# Patient Record
Sex: FEMALE | Race: WHITE | Employment: UNEMPLOYED | URBAN - METROPOLITAN AREA
[De-identification: names, ages, dates, MRNs, and addresses within clinical notes are randomized per-mention and may not be internally consistent; named-entity substitution may affect disease eponyms.]

---

## 2017-08-29 ENCOUNTER — OFFICE VISIT (OUTPATIENT)
Dept: FAMILY MEDICINE CLINIC | Age: 62
End: 2017-08-29
Payer: COMMERCIAL

## 2017-08-29 VITALS
SYSTOLIC BLOOD PRESSURE: 130 MMHG | HEART RATE: 81 BPM | HEIGHT: 65 IN | DIASTOLIC BLOOD PRESSURE: 80 MMHG | BODY MASS INDEX: 28.49 KG/M2 | WEIGHT: 171 LBS

## 2017-08-29 DIAGNOSIS — B37.2 YEAST DERMATITIS: Primary | ICD-10-CM

## 2017-08-29 PROCEDURE — 99213 OFFICE O/P EST LOW 20 MIN: CPT | Performed by: FAMILY MEDICINE

## 2017-08-29 RX ORDER — FLUCONAZOLE 100 MG/1
100 TABLET ORAL 2 TIMES DAILY
Qty: 14 TABLET | Refills: 0 | Status: SHIPPED | OUTPATIENT
Start: 2017-08-29 | End: 2017-09-05

## 2017-08-29 ASSESSMENT — PATIENT HEALTH QUESTIONNAIRE - PHQ9
2. FEELING DOWN, DEPRESSED OR HOPELESS: 0
SUM OF ALL RESPONSES TO PHQ9 QUESTIONS 1 & 2: 0
SUM OF ALL RESPONSES TO PHQ QUESTIONS 1-9: 0
1. LITTLE INTEREST OR PLEASURE IN DOING THINGS: 0

## 2017-08-29 ASSESSMENT — ENCOUNTER SYMPTOMS
VOMITING: 0
SINUS PRESSURE: 0
NAUSEA: 0
SHORTNESS OF BREATH: 0
DIARRHEA: 0
SORE THROAT: 0
COUGH: 0
BACK PAIN: 0

## 2018-06-07 ENCOUNTER — OFFICE VISIT (OUTPATIENT)
Dept: FAMILY MEDICINE CLINIC | Age: 63
End: 2018-06-07
Payer: COMMERCIAL

## 2018-06-07 ENCOUNTER — HOSPITAL ENCOUNTER (OUTPATIENT)
Age: 63
Setting detail: SPECIMEN
Discharge: HOME | End: 2018-06-07
Payer: COMMERCIAL

## 2018-06-07 VITALS
HEART RATE: 76 BPM | SYSTOLIC BLOOD PRESSURE: 118 MMHG | BODY MASS INDEX: 26.79 KG/M2 | DIASTOLIC BLOOD PRESSURE: 68 MMHG | WEIGHT: 161 LBS

## 2018-06-07 DIAGNOSIS — Z01.419 ENCOUNTER FOR PHYSICAL EXAMINATION, CONTRACEPTION, AND PAPANICOLAOU SMEAR: ICD-10-CM

## 2018-06-07 DIAGNOSIS — Z30.9 ENCOUNTER FOR PHYSICAL EXAMINATION, CONTRACEPTION, AND PAPANICOLAOU SMEAR: ICD-10-CM

## 2018-06-07 DIAGNOSIS — O24.419 GESTATIONAL DIABETES MELLITUS (GDM), ANTEPARTUM, GESTATIONAL DIABETES METHOD OF CONTROL UNSPECIFIED: ICD-10-CM

## 2018-06-07 DIAGNOSIS — R63.4 WEIGHT LOSS, NON-INTENTIONAL: ICD-10-CM

## 2018-06-07 DIAGNOSIS — Z12.39 BREAST CANCER SCREENING: ICD-10-CM

## 2018-06-07 DIAGNOSIS — Z00.00 PHYSICAL EXAM, ANNUAL: Primary | ICD-10-CM

## 2018-06-07 DIAGNOSIS — Z23 IMMUNIZATION DUE: ICD-10-CM

## 2018-06-07 DIAGNOSIS — Z13.820 OSTEOPOROSIS SCREENING: ICD-10-CM

## 2018-06-07 DIAGNOSIS — E78.5 DYSLIPIDEMIA: ICD-10-CM

## 2018-06-07 LAB
ABSOLUTE EOS #: 0.05 K/UL (ref 0–0.44)
ABSOLUTE IMMATURE GRANULOCYTE: <0.03 K/UL (ref 0–0.3)
ABSOLUTE LYMPH #: 1.87 K/UL (ref 1.1–3.7)
ABSOLUTE MONO #: 0.47 K/UL (ref 0.1–1.2)
ALBUMIN SERPL-MCNC: 4.2 G/DL (ref 3.5–5.2)
ALBUMIN/GLOBULIN RATIO: 1.3 (ref 1–2.5)
ALP BLD-CCNC: 104 U/L (ref 35–104)
ALT SERPL-CCNC: 42 U/L (ref 5–33)
ANION GAP SERPL CALCULATED.3IONS-SCNC: 18 MMOL/L (ref 9–17)
AST SERPL-CCNC: 29 U/L
BASOPHILS # BLD: 1 % (ref 0–2)
BASOPHILS ABSOLUTE: 0.03 K/UL (ref 0–0.2)
BILIRUB SERPL-MCNC: 0.46 MG/DL (ref 0.3–1.2)
BUN BLDV-MCNC: 17 MG/DL (ref 8–23)
BUN/CREAT BLD: ABNORMAL (ref 9–20)
CALCIUM SERPL-MCNC: 9.2 MG/DL (ref 8.6–10.4)
CHLORIDE BLD-SCNC: 96 MMOL/L (ref 98–107)
CHOLESTEROL/HDL RATIO: 10
CHOLESTEROL: 462 MG/DL
CO2: 21 MMOL/L (ref 20–31)
CREAT SERPL-MCNC: 0.58 MG/DL (ref 0.5–0.9)
DIFFERENTIAL TYPE: ABNORMAL
EOSINOPHILS RELATIVE PERCENT: 1 % (ref 1–4)
ESTIMATED AVERAGE GLUCOSE: 321 MG/DL
GFR AFRICAN AMERICAN: >60 ML/MIN
GFR NON-AFRICAN AMERICAN: >60 ML/MIN
GFR SERPL CREATININE-BSD FRML MDRD: ABNORMAL ML/MIN/{1.73_M2}
GFR SERPL CREATININE-BSD FRML MDRD: ABNORMAL ML/MIN/{1.73_M2}
GLUCOSE BLD-MCNC: 337 MG/DL (ref 70–99)
HBA1C MFR BLD: 12.8 % (ref 4–6)
HCT VFR BLD CALC: 51 % (ref 36.3–47.1)
HDLC SERPL-MCNC: 46 MG/DL
HEMOGLOBIN: 16 G/DL (ref 11.9–15.1)
HEPATITIS C ANTIBODY: NONREACTIVE
IMMATURE GRANULOCYTES: 0 %
LDL CHOLESTEROL DIRECT: 335 MG/DL
LDL CHOLESTEROL: ABNORMAL MG/DL (ref 0–130)
LYMPHOCYTES # BLD: 31 % (ref 24–43)
MCH RBC QN AUTO: 28.2 PG (ref 25.2–33.5)
MCHC RBC AUTO-ENTMCNC: 31.4 G/DL (ref 28.4–34.8)
MCV RBC AUTO: 89.9 FL (ref 82.6–102.9)
MONOCYTES # BLD: 8 % (ref 3–12)
NRBC AUTOMATED: 0 PER 100 WBC
PDW BLD-RTO: 12.7 % (ref 11.8–14.4)
PLATELET # BLD: 300 K/UL (ref 138–453)
PLATELET ESTIMATE: ABNORMAL
PMV BLD AUTO: 12.7 FL (ref 8.1–13.5)
POTASSIUM SERPL-SCNC: 4.2 MMOL/L (ref 3.7–5.3)
RBC # BLD: 5.67 M/UL (ref 3.95–5.11)
RBC # BLD: ABNORMAL 10*6/UL
SEG NEUTROPHILS: 59 % (ref 36–65)
SEGMENTED NEUTROPHILS ABSOLUTE COUNT: 3.69 K/UL (ref 1.5–8.1)
SODIUM BLD-SCNC: 135 MMOL/L (ref 135–144)
T4 TOTAL: 7.2 UG/DL (ref 4.5–12)
TOTAL PROTEIN: 7.5 G/DL (ref 6.4–8.3)
TRIGL SERPL-MCNC: 526 MG/DL
TSH SERPL DL<=0.05 MIU/L-ACNC: 2.33 MIU/L (ref 0.3–5)
VLDLC SERPL CALC-MCNC: ABNORMAL MG/DL (ref 1–30)
WBC # BLD: 6.1 K/UL (ref 3.5–11.3)
WBC # BLD: ABNORMAL 10*3/UL

## 2018-06-07 PROCEDURE — 90715 TDAP VACCINE 7 YRS/> IM: CPT | Performed by: FAMILY MEDICINE

## 2018-06-07 PROCEDURE — 90732 PPSV23 VACC 2 YRS+ SUBQ/IM: CPT | Performed by: FAMILY MEDICINE

## 2018-06-07 PROCEDURE — 99396 PREV VISIT EST AGE 40-64: CPT | Performed by: FAMILY MEDICINE

## 2018-06-07 PROCEDURE — 90471 IMMUNIZATION ADMIN: CPT | Performed by: FAMILY MEDICINE

## 2018-06-07 PROCEDURE — 90472 IMMUNIZATION ADMIN EACH ADD: CPT | Performed by: FAMILY MEDICINE

## 2018-06-07 ASSESSMENT — ENCOUNTER SYMPTOMS
SHORTNESS OF BREATH: 0
NAUSEA: 0
VOMITING: 0
COUGH: 0
DIARRHEA: 0
BACK PAIN: 0
SORE THROAT: 0
SINUS PRESSURE: 0

## 2018-06-08 RX ORDER — ATORVASTATIN CALCIUM 20 MG/1
20 TABLET, FILM COATED ORAL DAILY
Qty: 30 TABLET | Refills: 3 | Status: SHIPPED | OUTPATIENT
Start: 2018-06-08 | End: 2018-10-08 | Stop reason: SDUPTHER

## 2018-06-27 LAB — CYTOLOGY REPORT: NORMAL

## 2018-07-17 ENCOUNTER — HOSPITAL ENCOUNTER (OUTPATIENT)
Dept: MAMMOGRAPHY | Age: 63
Discharge: HOME | End: 2018-07-19
Payer: COMMERCIAL

## 2018-07-17 DIAGNOSIS — Z12.39 BREAST CANCER SCREENING: ICD-10-CM

## 2018-07-17 DIAGNOSIS — Z13.820 OSTEOPOROSIS SCREENING: ICD-10-CM

## 2018-07-17 PROCEDURE — 77063 BREAST TOMOSYNTHESIS BI: CPT

## 2018-07-17 PROCEDURE — 77080 DXA BONE DENSITY AXIAL: CPT

## 2018-07-18 ENCOUNTER — TELEPHONE (OUTPATIENT)
Dept: OTHER | Age: 63
End: 2018-07-18

## 2018-07-19 DIAGNOSIS — Z00.00 ROUTINE ADULT HEALTH MAINTENANCE: Primary | ICD-10-CM

## 2018-07-20 ENCOUNTER — HOSPITAL ENCOUNTER (OUTPATIENT)
Age: 63
Setting detail: SPECIMEN
Discharge: HOME | End: 2018-07-20
Payer: COMMERCIAL

## 2018-07-20 DIAGNOSIS — Z00.00 ROUTINE ADULT HEALTH MAINTENANCE: ICD-10-CM

## 2018-07-20 LAB
ABSOLUTE EOS #: 0.17 K/UL (ref 0–0.44)
ABSOLUTE IMMATURE GRANULOCYTE: 0.03 K/UL (ref 0–0.3)
ABSOLUTE LYMPH #: 2.73 K/UL (ref 1.1–3.7)
ABSOLUTE MONO #: 0.76 K/UL (ref 0.1–1.2)
ALBUMIN SERPL-MCNC: 4 G/DL (ref 3.5–5.2)
ALBUMIN/GLOBULIN RATIO: 1.3 (ref 1–2.5)
ALP BLD-CCNC: 86 U/L (ref 35–104)
ALT SERPL-CCNC: 36 U/L (ref 5–33)
ANION GAP SERPL CALCULATED.3IONS-SCNC: 16 MMOL/L (ref 9–17)
AST SERPL-CCNC: 21 U/L
BASOPHILS # BLD: 1 % (ref 0–2)
BASOPHILS ABSOLUTE: 0.06 K/UL (ref 0–0.2)
BILIRUB SERPL-MCNC: 0.38 MG/DL (ref 0.3–1.2)
BUN BLDV-MCNC: 13 MG/DL (ref 8–23)
BUN/CREAT BLD: ABNORMAL (ref 9–20)
CALCIUM SERPL-MCNC: 9.1 MG/DL (ref 8.6–10.4)
CHLORIDE BLD-SCNC: 104 MMOL/L (ref 98–107)
CHOLESTEROL/HDL RATIO: 4.5
CHOLESTEROL: 228 MG/DL
CO2: 21 MMOL/L (ref 20–31)
CREAT SERPL-MCNC: 0.56 MG/DL (ref 0.5–0.9)
DIFFERENTIAL TYPE: ABNORMAL
EOSINOPHILS RELATIVE PERCENT: 2 % (ref 1–4)
ESTIMATED AVERAGE GLUCOSE: 280 MG/DL
GFR AFRICAN AMERICAN: >60 ML/MIN
GFR NON-AFRICAN AMERICAN: >60 ML/MIN
GFR SERPL CREATININE-BSD FRML MDRD: ABNORMAL ML/MIN/{1.73_M2}
GFR SERPL CREATININE-BSD FRML MDRD: ABNORMAL ML/MIN/{1.73_M2}
GLUCOSE BLD-MCNC: 194 MG/DL (ref 70–99)
HBA1C MFR BLD: 11.4 % (ref 4–6)
HCT VFR BLD CALC: 47.3 % (ref 36.3–47.1)
HDLC SERPL-MCNC: 51 MG/DL
HEMOGLOBIN: 14.6 G/DL (ref 11.9–15.1)
IMMATURE GRANULOCYTES: 0 %
LDL CHOLESTEROL: 138 MG/DL (ref 0–130)
LYMPHOCYTES # BLD: 26 % (ref 24–43)
MCH RBC QN AUTO: 28 PG (ref 25.2–33.5)
MCHC RBC AUTO-ENTMCNC: 30.9 G/DL (ref 28.4–34.8)
MCV RBC AUTO: 90.6 FL (ref 82.6–102.9)
MONOCYTES # BLD: 7 % (ref 3–12)
NRBC AUTOMATED: 0 PER 100 WBC
PDW BLD-RTO: 12.7 % (ref 11.8–14.4)
PLATELET # BLD: 269 K/UL (ref 138–453)
PLATELET ESTIMATE: ABNORMAL
PMV BLD AUTO: 12.4 FL (ref 8.1–13.5)
POTASSIUM SERPL-SCNC: 4.4 MMOL/L (ref 3.7–5.3)
RBC # BLD: 5.22 M/UL (ref 3.95–5.11)
RBC # BLD: ABNORMAL 10*6/UL
SEG NEUTROPHILS: 64 % (ref 36–65)
SEGMENTED NEUTROPHILS ABSOLUTE COUNT: 6.63 K/UL (ref 1.5–8.1)
SODIUM BLD-SCNC: 141 MMOL/L (ref 135–144)
TOTAL PROTEIN: 7.2 G/DL (ref 6.4–8.3)
TRIGL SERPL-MCNC: 197 MG/DL
VLDLC SERPL CALC-MCNC: ABNORMAL MG/DL (ref 1–30)
WBC # BLD: 10.4 K/UL (ref 3.5–11.3)
WBC # BLD: ABNORMAL 10*3/UL

## 2018-07-24 ENCOUNTER — OFFICE VISIT (OUTPATIENT)
Dept: FAMILY MEDICINE CLINIC | Age: 63
End: 2018-07-24
Payer: COMMERCIAL

## 2018-07-24 VITALS
WEIGHT: 165 LBS | DIASTOLIC BLOOD PRESSURE: 68 MMHG | SYSTOLIC BLOOD PRESSURE: 122 MMHG | HEART RATE: 72 BPM | BODY MASS INDEX: 27.46 KG/M2

## 2018-07-24 DIAGNOSIS — E78.5 DYSLIPIDEMIA: ICD-10-CM

## 2018-07-24 DIAGNOSIS — E11.9 TYPE 2 DIABETES MELLITUS WITHOUT COMPLICATION, WITHOUT LONG-TERM CURRENT USE OF INSULIN (HCC): Primary | ICD-10-CM

## 2018-07-24 PROCEDURE — 99213 OFFICE O/P EST LOW 20 MIN: CPT | Performed by: FAMILY MEDICINE

## 2018-07-24 ASSESSMENT — ENCOUNTER SYMPTOMS
BACK PAIN: 0
SHORTNESS OF BREATH: 0
VOMITING: 0
NAUSEA: 0
SINUS PRESSURE: 0
DIARRHEA: 1
COUGH: 0
SORE THROAT: 0

## 2018-07-24 NOTE — PROGRESS NOTES
past surgical history on file. Family History   Problem Relation Age of Onset    Cancer Mother         pancreas    Heart Disease Mother         tachycardia; ? diagnosis    Cancer Father         thyroid    Diabetes Father     Cancer Maternal Uncle         pancreas; bladder;     Heart Disease Maternal Uncle         hearth failure, ?cause    Cancer Paternal Uncle         lung in smoker    Cancer Paternal Grandfather         esophageal     Social History   Substance Use Topics    Smoking status: Never Smoker    Smokeless tobacco: Never Used    Alcohol use Not on file      Current Outpatient Prescriptions   Medication Sig Dispense Refill    metFORMIN (GLUCOPHAGE) 500 MG tablet Take 1 tablet by mouth 2 times daily 60 tablet 3    atorvastatin (LIPITOR) 20 MG tablet Take 1 tablet by mouth daily 30 tablet 3     No current facility-administered medications for this visit. No Known Allergies      Subjective:   Review of Systems   Constitutional: Negative for chills, diaphoresis and fever. HENT: Negative for congestion, sinus pressure and sore throat. Eyes: Negative for visual disturbance. Respiratory: Negative for cough and shortness of breath. Cardiovascular: Negative for chest pain and leg swelling. Gastrointestinal: Positive for diarrhea. Negative for nausea and vomiting. Genitourinary: Negative for dysuria, menstrual problem, urgency and vaginal discharge. Musculoskeletal: Negative for arthralgias, back pain and myalgias. Skin: Negative for rash. Neurological: Negative for weakness, numbness and headaches. Psychiatric/Behavioral: Negative for sleep disturbance. Objective:   /68   Pulse 72   Wt 165 lb (74.8 kg)   BMI 27.46 kg/m²     Physical Exam   Constitutional: She is oriented to person, place, and time. She appears well-developed and well-nourished. No distress. HENT:   Head: Normocephalic and atraumatic. Mouth/Throat: No oropharyngeal exudate.    Eyes: No

## 2018-10-08 RX ORDER — ATORVASTATIN CALCIUM 20 MG/1
20 TABLET, FILM COATED ORAL DAILY
Qty: 30 TABLET | Refills: 11 | Status: SHIPPED | OUTPATIENT
Start: 2018-10-08 | End: 2019-10-09 | Stop reason: SDUPTHER

## 2018-10-22 ENCOUNTER — HOSPITAL ENCOUNTER (OUTPATIENT)
Age: 63
Setting detail: SPECIMEN
Discharge: HOME | End: 2018-10-22
Payer: COMMERCIAL

## 2018-10-22 DIAGNOSIS — E11.9 TYPE 2 DIABETES MELLITUS WITHOUT COMPLICATION, WITHOUT LONG-TERM CURRENT USE OF INSULIN (HCC): ICD-10-CM

## 2018-10-22 LAB
ABSOLUTE EOS #: 0.1 K/UL (ref 0–0.44)
ABSOLUTE IMMATURE GRANULOCYTE: 0.03 K/UL (ref 0–0.3)
ABSOLUTE LYMPH #: 2.1 K/UL (ref 1.1–3.7)
ABSOLUTE MONO #: 0.52 K/UL (ref 0.1–1.2)
ALBUMIN SERPL-MCNC: 4 G/DL (ref 3.5–5.2)
ALBUMIN/GLOBULIN RATIO: 1.3 (ref 1–2.5)
ALP BLD-CCNC: 89 U/L (ref 35–104)
ALT SERPL-CCNC: 58 U/L (ref 5–33)
ANION GAP SERPL CALCULATED.3IONS-SCNC: 18 MMOL/L (ref 9–17)
AST SERPL-CCNC: 30 U/L
BASOPHILS # BLD: 1 % (ref 0–2)
BASOPHILS ABSOLUTE: 0.04 K/UL (ref 0–0.2)
BILIRUB SERPL-MCNC: 0.48 MG/DL (ref 0.3–1.2)
BUN BLDV-MCNC: 14 MG/DL (ref 8–23)
BUN/CREAT BLD: ABNORMAL (ref 9–20)
CALCIUM SERPL-MCNC: 9.3 MG/DL (ref 8.6–10.4)
CHLORIDE BLD-SCNC: 105 MMOL/L (ref 98–107)
CHOLESTEROL/HDL RATIO: 4.5
CHOLESTEROL: 236 MG/DL
CO2: 22 MMOL/L (ref 20–31)
CREAT SERPL-MCNC: 0.6 MG/DL (ref 0.5–0.9)
DIFFERENTIAL TYPE: ABNORMAL
EOSINOPHILS RELATIVE PERCENT: 1 % (ref 1–4)
ESTIMATED AVERAGE GLUCOSE: 186 MG/DL
GFR AFRICAN AMERICAN: >60 ML/MIN
GFR NON-AFRICAN AMERICAN: >60 ML/MIN
GFR SERPL CREATININE-BSD FRML MDRD: ABNORMAL ML/MIN/{1.73_M2}
GFR SERPL CREATININE-BSD FRML MDRD: ABNORMAL ML/MIN/{1.73_M2}
GLUCOSE BLD-MCNC: 150 MG/DL (ref 70–99)
HBA1C MFR BLD: 8.1 % (ref 4–6)
HCT VFR BLD CALC: 48 % (ref 36.3–47.1)
HDLC SERPL-MCNC: 53 MG/DL
HEMOGLOBIN: 14.7 G/DL (ref 11.9–15.1)
IMMATURE GRANULOCYTES: 0 %
LDL CHOLESTEROL: 142 MG/DL (ref 0–130)
LYMPHOCYTES # BLD: 28 % (ref 24–43)
MCH RBC QN AUTO: 27.9 PG (ref 25.2–33.5)
MCHC RBC AUTO-ENTMCNC: 30.6 G/DL (ref 28.4–34.8)
MCV RBC AUTO: 91.3 FL (ref 82.6–102.9)
MONOCYTES # BLD: 7 % (ref 3–12)
NRBC AUTOMATED: 0 PER 100 WBC
PDW BLD-RTO: 12.7 % (ref 11.8–14.4)
PLATELET # BLD: 281 K/UL (ref 138–453)
PLATELET ESTIMATE: ABNORMAL
PMV BLD AUTO: 12.1 FL (ref 8.1–13.5)
POTASSIUM SERPL-SCNC: 3.9 MMOL/L (ref 3.7–5.3)
RBC # BLD: 5.26 M/UL (ref 3.95–5.11)
RBC # BLD: ABNORMAL 10*6/UL
SEG NEUTROPHILS: 63 % (ref 36–65)
SEGMENTED NEUTROPHILS ABSOLUTE COUNT: 4.78 K/UL (ref 1.5–8.1)
SODIUM BLD-SCNC: 145 MMOL/L (ref 135–144)
TOTAL PROTEIN: 7.1 G/DL (ref 6.4–8.3)
TRIGL SERPL-MCNC: 207 MG/DL
VLDLC SERPL CALC-MCNC: ABNORMAL MG/DL (ref 1–30)
WBC # BLD: 7.6 K/UL (ref 3.5–11.3)
WBC # BLD: ABNORMAL 10*3/UL

## 2018-10-29 ENCOUNTER — OFFICE VISIT (OUTPATIENT)
Dept: FAMILY MEDICINE CLINIC | Age: 63
End: 2018-10-29
Payer: COMMERCIAL

## 2018-10-29 VITALS
SYSTOLIC BLOOD PRESSURE: 128 MMHG | DIASTOLIC BLOOD PRESSURE: 74 MMHG | BODY MASS INDEX: 28.52 KG/M2 | HEART RATE: 83 BPM | HEIGHT: 65 IN | WEIGHT: 171.2 LBS | OXYGEN SATURATION: 98 %

## 2018-10-29 DIAGNOSIS — Z23 IMMUNIZATION DUE: ICD-10-CM

## 2018-10-29 DIAGNOSIS — E11.9 TYPE 2 DIABETES MELLITUS WITHOUT COMPLICATION, WITHOUT LONG-TERM CURRENT USE OF INSULIN (HCC): ICD-10-CM

## 2018-10-29 DIAGNOSIS — M25.561 ARTHRALGIA OF BOTH KNEES: Primary | ICD-10-CM

## 2018-10-29 DIAGNOSIS — E78.5 DYSLIPIDEMIA: ICD-10-CM

## 2018-10-29 DIAGNOSIS — M25.562 ARTHRALGIA OF BOTH KNEES: Primary | ICD-10-CM

## 2018-10-29 PROCEDURE — 90688 IIV4 VACCINE SPLT 0.5 ML IM: CPT | Performed by: FAMILY MEDICINE

## 2018-10-29 PROCEDURE — 99213 OFFICE O/P EST LOW 20 MIN: CPT | Performed by: FAMILY MEDICINE

## 2018-10-29 PROCEDURE — 90471 IMMUNIZATION ADMIN: CPT | Performed by: FAMILY MEDICINE

## 2018-10-29 RX ORDER — METFORMIN HYDROCHLORIDE 500 MG/1
1000 TABLET, EXTENDED RELEASE ORAL
Qty: 60 TABLET | Refills: 5 | Status: SHIPPED | OUTPATIENT
Start: 2018-10-29 | End: 2019-09-10 | Stop reason: ALTCHOICE

## 2018-10-29 ASSESSMENT — ENCOUNTER SYMPTOMS
DIARRHEA: 0
SINUS PRESSURE: 0
NAUSEA: 0
VOMITING: 0
BACK PAIN: 0
SORE THROAT: 0
SHORTNESS OF BREATH: 0
COUGH: 0

## 2018-10-29 ASSESSMENT — PATIENT HEALTH QUESTIONNAIRE - PHQ9
2. FEELING DOWN, DEPRESSED OR HOPELESS: 0
1. LITTLE INTEREST OR PLEASURE IN DOING THINGS: 0
SUM OF ALL RESPONSES TO PHQ QUESTIONS 1-9: 0
SUM OF ALL RESPONSES TO PHQ9 QUESTIONS 1 & 2: 0
SUM OF ALL RESPONSES TO PHQ QUESTIONS 1-9: 0

## 2018-10-29 NOTE — PROGRESS NOTES
Diagnosis Date    Gestational diabetes       No past surgical history on file. Family History   Problem Relation Age of Onset    Cancer Mother         pancreas    Heart Disease Mother         tachycardia; ? diagnosis    Cancer Father         thyroid    Diabetes Father     Cancer Maternal Uncle         pancreas; bladder;     Heart Disease Maternal Uncle         hearth failure, ?cause    Cancer Paternal Uncle         lung in smoker    Cancer Paternal Grandfather         esophageal     Social History   Substance Use Topics    Smoking status: Never Smoker    Smokeless tobacco: Never Used    Alcohol use Not on file      Current Outpatient Prescriptions   Medication Sig Dispense Refill    metFORMIN (GLUCOPHAGE-XR) 500 MG extended release tablet Take 2 tablets by mouth daily (with breakfast) 60 tablet 5    atorvastatin (LIPITOR) 20 MG tablet take 1 tablet by mouth daily 30 tablet 11     No current facility-administered medications for this visit. No Known Allergies      Subjective:   Review of Systems   Constitutional: Negative for chills, diaphoresis and fever. HENT: Negative for congestion, sinus pressure and sore throat. Eyes: Negative for visual disturbance. Respiratory: Negative for cough and shortness of breath. Cardiovascular: Negative for chest pain and leg swelling. Gastrointestinal: Negative for diarrhea, nausea and vomiting. Genitourinary: Negative for dysuria, menstrual problem, urgency and vaginal discharge. Musculoskeletal: Positive for arthralgias and gait problem. Negative for back pain, joint swelling and myalgias. Skin: Negative for rash. Neurological: Negative for weakness, numbness and headaches. Psychiatric/Behavioral: Negative for sleep disturbance. Objective:   /74   Pulse 83   Ht 5' 5\" (1.651 m)   Wt 171 lb 3.2 oz (77.7 kg)   SpO2 98%   BMI 28.49 kg/m²     Physical Exam   Constitutional: She is oriented to person, place, and time.  She

## 2018-11-06 DIAGNOSIS — M25.561 PAIN IN BOTH KNEES, UNSPECIFIED CHRONICITY: Primary | ICD-10-CM

## 2018-11-06 DIAGNOSIS — M25.562 PAIN IN BOTH KNEES, UNSPECIFIED CHRONICITY: Primary | ICD-10-CM

## 2018-11-08 ENCOUNTER — OFFICE VISIT (OUTPATIENT)
Dept: ORTHOPEDIC SURGERY | Age: 63
End: 2018-11-08
Payer: COMMERCIAL

## 2018-11-08 VITALS — BODY MASS INDEX: 28.99 KG/M2 | HEIGHT: 65 IN | WEIGHT: 174 LBS

## 2018-11-08 DIAGNOSIS — Z01.818 PRE-OP TESTING: ICD-10-CM

## 2018-11-08 DIAGNOSIS — M17.11 ARTHRITIS OF RIGHT KNEE: Primary | ICD-10-CM

## 2018-11-08 DIAGNOSIS — M17.12 ARTHRITIS OF LEFT KNEE: ICD-10-CM

## 2018-11-08 PROCEDURE — 99244 OFF/OP CNSLTJ NEW/EST MOD 40: CPT | Performed by: ORTHOPAEDIC SURGERY

## 2018-11-08 PROCEDURE — 20610 DRAIN/INJ JOINT/BURSA W/O US: CPT | Performed by: ORTHOPAEDIC SURGERY

## 2018-11-08 RX ORDER — METHYLPREDNISOLONE ACETATE 80 MG/ML
80 INJECTION, SUSPENSION INTRA-ARTICULAR; INTRALESIONAL; INTRAMUSCULAR; SOFT TISSUE ONCE
Status: COMPLETED | OUTPATIENT
Start: 2018-11-08 | End: 2018-11-09

## 2018-11-08 RX ORDER — ACETAMINOPHEN 325 MG/1
650 TABLET ORAL EVERY 6 HOURS PRN
COMMUNITY
End: 2019-04-02 | Stop reason: ALTCHOICE

## 2018-11-08 RX ORDER — BUPIVACAINE HYDROCHLORIDE 2.5 MG/ML
2 INJECTION, SOLUTION INFILTRATION; PERINEURAL ONCE
Status: COMPLETED | OUTPATIENT
Start: 2018-11-08 | End: 2018-11-09

## 2018-11-08 ASSESSMENT — ENCOUNTER SYMPTOMS
APNEA: 0
VOMITING: 0
CHEST TIGHTNESS: 0
ABDOMINAL PAIN: 0
COUGH: 0

## 2018-11-08 NOTE — PROGRESS NOTES
[facility]  [address] [place], [place] [address] [address] Jamil Howell [address]  Dept: [phone]    Ambulatory Orthopedic Consult      CHIEF COMPLAINT:    Chief Complaint   Patient presents with    Knee Pain     bilateral       HISTORY OF PRESENT ILLNESS:      The patient is a 59 y.o. female who is being seen at the request of  Stephan Valencia MD for consultation and evaluation of bilateral knee pain. Shannan Haley  presents for bilateral knee pain that has been present for approximately 6 months. The patient states that the right is worse than the left. The patient does not recall a specific injury. The pain improves with resting and icing. The pain worsens with  stair climbing and arising from a seated position as well as when she is hiking with her . The patient has pain with walking down hill when hiking and she does see some instability with the right knee. The patient has not had a previous corticosteroid injection. The patient has tried home exercise program as well as activity modification. The patient has tried oral NSAIDs for this problem previously but has not seen any relief with them. The patient has recently decided to go to the gym to work on strengthening of the knees. Past Medical History:    Past Medical History:   Diagnosis Date    Gestational diabetes        Past Surgical History:    No past surgical history on file. Current Medications:   Current Outpatient Prescriptions   Medication Sig Dispense Refill    acetaminophen (TYLENOL) 325 MG tablet Take 650 mg by mouth every 6 hours as needed for Pain      metFORMIN (GLUCOPHAGE-XR) 500 MG extended release tablet Take 2 tablets by mouth daily (with breakfast) 60 tablet 5    atorvastatin (LIPITOR) 20 MG tablet take 1 tablet by mouth daily 30 tablet 11     No current facility-administered medications for this visit. Allergies:    Patient has no known allergies.     Social History:   Social History History: Left knee pain. Findings: Standing AP, lateral, merchant, tunnel view x-rays of left knee done the office today shows severe medial joint space narrowing with periarticular osteophytosis and joint line sclerosis. No evidence of fracture, subluxation, dislocation, radiopaque foreign body, radiopaque tumors appreciated. Impression: Severe degenerative changes left knee as described above. KNEE INJECTION PROCEDURE NOTE:  The patient was identified. The left knee was confirmed with the patient. After a sterile prep with Betadine the knee was injected using a lateral joint line approach with a mixture of 2 mL of 0.25% Marcaine and 80 mg of Depo-Medrol. Patient tolerated the procedure well without post injection complications. I instructed the patient to call our office immediately if they have any swelling or increased pain at the injection site. ASSESSMENT:     1. Arthritis of right knee    2. Arthritis of left knee    3. Pre-op testing         PLAN:       Reviewed x-rays with the patient today in the office. Discussed etiology and natural history of Bilateral knee arthritis. The treatment options may include oral anti-inflammatories, bracing, injections, advanced imaging, activity modification, physical therapy and/or surgical intervention. Due to deformity to the right knee I would highly suggest she has a right knee total arthroplasty. I feel that over time the deformity will worsen and cause more pain and instability to knee right knee. I feel that the left knee is okay to treat non-operatively at this time. The patient would like to proceed with right knee total arthroplasty and treat left knee non-operatively. The patient would like to have a corticosteroid injection to the left knee, continue to do her own work outs, and take OTC anti-inflammtories. The patient tolerates corticosteroid injection to left knee well and without complication.  The patient will follow up in the office 2 recognition program.  While intending to generate a document that actually reflects the content of the visit, the document can still have some errors including those of syntax and sound a like substitutions which may escape proof reading.  In such instances, actual meaning can be extrapolated by contextual diversion      Electronically signed by José Manuel Mcnally DO, FAOAO on 11/10/2018 at 6:12 PM

## 2018-11-09 RX ADMIN — BUPIVACAINE HYDROCHLORIDE 5 MG: 2.5 INJECTION, SOLUTION INFILTRATION; PERINEURAL at 13:53

## 2018-11-09 RX ADMIN — METHYLPREDNISOLONE ACETATE 80 MG: 80 INJECTION, SUSPENSION INTRA-ARTICULAR; INTRALESIONAL; INTRAMUSCULAR; SOFT TISSUE at 13:54

## 2018-11-12 ENCOUNTER — TELEPHONE (OUTPATIENT)
Dept: ORTHOPEDIC SURGERY | Age: 63
End: 2018-11-12

## 2018-11-12 DIAGNOSIS — Z01.818 PRE-OP TESTING: Primary | ICD-10-CM

## 2018-12-20 ENCOUNTER — TELEPHONE (OUTPATIENT)
Dept: FAMILY MEDICINE CLINIC | Age: 63
End: 2018-12-20

## 2018-12-21 ENCOUNTER — HOSPITAL ENCOUNTER (OUTPATIENT)
Dept: GENERAL RADIOLOGY | Age: 63
Discharge: HOME | End: 2018-12-23
Payer: COMMERCIAL

## 2018-12-21 ENCOUNTER — HOSPITAL ENCOUNTER (OUTPATIENT)
Dept: PREADMISSION TESTING | Age: 63
Discharge: HOME | End: 2018-12-25
Payer: COMMERCIAL

## 2018-12-21 VITALS
OXYGEN SATURATION: 97 % | DIASTOLIC BLOOD PRESSURE: 79 MMHG | HEART RATE: 71 BPM | HEIGHT: 65 IN | TEMPERATURE: 98 F | RESPIRATION RATE: 20 BRPM | SYSTOLIC BLOOD PRESSURE: 143 MMHG | WEIGHT: 175.04 LBS | BODY MASS INDEX: 29.16 KG/M2

## 2018-12-21 LAB
ALBUMIN SERPL-MCNC: 4.1 G/DL (ref 3.5–5.2)
ALBUMIN/GLOBULIN RATIO: 1.3 (ref 1–2.5)
ALP BLD-CCNC: 93 U/L (ref 35–104)
ALT SERPL-CCNC: 38 U/L (ref 5–33)
ANION GAP SERPL CALCULATED.3IONS-SCNC: 13 MMOL/L (ref 9–17)
AST SERPL-CCNC: 28 U/L
BILIRUB SERPL-MCNC: 0.49 MG/DL (ref 0.3–1.2)
BILIRUBIN URINE: NEGATIVE
BUN BLDV-MCNC: 13 MG/DL (ref 8–23)
BUN/CREAT BLD: ABNORMAL (ref 9–20)
CALCIUM SERPL-MCNC: 9.5 MG/DL (ref 8.6–10.4)
CHLORIDE BLD-SCNC: 103 MMOL/L (ref 98–107)
CO2: 25 MMOL/L (ref 20–31)
COLOR: YELLOW
COMMENT UA: NORMAL
CREAT SERPL-MCNC: 0.59 MG/DL (ref 0.5–0.9)
EKG ATRIAL RATE: 66 BPM
EKG P AXIS: 20 DEGREES
EKG P-R INTERVAL: 140 MS
EKG Q-T INTERVAL: 428 MS
EKG QRS DURATION: 90 MS
EKG QTC CALCULATION (BAZETT): 448 MS
EKG R AXIS: 14 DEGREES
EKG T AXIS: 6 DEGREES
EKG VENTRICULAR RATE: 66 BPM
ESTIMATED AVERAGE GLUCOSE: 203 MG/DL
GFR AFRICAN AMERICAN: >60 ML/MIN
GFR NON-AFRICAN AMERICAN: >60 ML/MIN
GFR SERPL CREATININE-BSD FRML MDRD: ABNORMAL ML/MIN/{1.73_M2}
GFR SERPL CREATININE-BSD FRML MDRD: ABNORMAL ML/MIN/{1.73_M2}
GLUCOSE BLD-MCNC: 121 MG/DL (ref 70–99)
GLUCOSE URINE: NEGATIVE
HBA1C MFR BLD: 8.7 % (ref 4–6)
HCT VFR BLD CALC: 46.1 % (ref 36.3–47.1)
HEMOGLOBIN: 14.6 G/DL (ref 11.9–15.1)
KETONES, URINE: NEGATIVE
LEUKOCYTE ESTERASE, URINE: NEGATIVE
MCH RBC QN AUTO: 28.3 PG (ref 25.2–33.5)
MCHC RBC AUTO-ENTMCNC: 31.7 G/DL (ref 28.4–34.8)
MCV RBC AUTO: 89.3 FL (ref 82.6–102.9)
MRSA, DNA, NASAL: NORMAL
NITRITE, URINE: NEGATIVE
NRBC AUTOMATED: 0 PER 100 WBC
PDW BLD-RTO: 12.9 % (ref 11.8–14.4)
PH UA: 5.5 (ref 5–8)
PLATELET # BLD: 299 K/UL (ref 138–453)
PMV BLD AUTO: 11.8 FL (ref 8.1–13.5)
POTASSIUM SERPL-SCNC: 3.9 MMOL/L (ref 3.7–5.3)
PROTEIN UA: NEGATIVE
RBC # BLD: 5.16 M/UL (ref 3.95–5.11)
SODIUM BLD-SCNC: 141 MMOL/L (ref 135–144)
SPECIFIC GRAVITY UA: 1.02 (ref 1–1.03)
SPECIMEN DESCRIPTION: NORMAL
TOTAL PROTEIN: 7.2 G/DL (ref 6.4–8.3)
TURBIDITY: CLEAR
URINE HGB: NEGATIVE
UROBILINOGEN, URINE: NORMAL
WBC # BLD: 8.1 K/UL (ref 3.5–11.3)

## 2018-12-21 PROCEDURE — 71046 X-RAY EXAM CHEST 2 VIEWS: CPT

## 2018-12-21 PROCEDURE — 80053 COMPREHEN METABOLIC PANEL: CPT

## 2018-12-21 PROCEDURE — 36415 COLL VENOUS BLD VENIPUNCTURE: CPT

## 2018-12-21 PROCEDURE — 83036 HEMOGLOBIN GLYCOSYLATED A1C: CPT

## 2018-12-21 PROCEDURE — 85027 COMPLETE CBC AUTOMATED: CPT

## 2018-12-21 PROCEDURE — 93005 ELECTROCARDIOGRAM TRACING: CPT

## 2018-12-21 PROCEDURE — 81003 URINALYSIS AUTO W/O SCOPE: CPT

## 2018-12-21 PROCEDURE — 87641 MR-STAPH DNA AMP PROBE: CPT

## 2018-12-21 RX ORDER — SODIUM CHLORIDE, SODIUM LACTATE, POTASSIUM CHLORIDE, CALCIUM CHLORIDE 600; 310; 30; 20 MG/100ML; MG/100ML; MG/100ML; MG/100ML
1000 INJECTION, SOLUTION INTRAVENOUS CONTINUOUS
Status: CANCELLED | OUTPATIENT
Start: 2018-12-21

## 2018-12-21 NOTE — ANESTHESIA PRE-OP
Anesthesia Focused Assessment    STOP-BANG Sleep Apnea Questionnaire    Obstructive Sleep Apnea:                                                                 No     Machine used:                                                                                  No            SNORE loudly (heard through closed doors)? No      TIRED, fatigued, sleepy during daytime? No      OBSERVED stopping breathing during sleep? No      High blood PRESSURE being treated? No      BMI over 35? No  AGE over 48? Yes  NECK circumference over 16\"? No  GENDER (male)? No               High risk 5-8  Intermediate risk 3-4  Low risk 0-2    Total 3  High risk 5-8  Intermediate risk 3-4  Low risk 0-2      Type 1 DM:                                                                                        No    TYPE 2:                                                                                             Yes    If yes, insulin dependent? No    Coronary Artery Disease :                                                                No        Active smoker                                                                                   No    Drinks Alcohol                                                                                   Yes, social    Dentition: Dentures                                                                            No              Partial                                                                                                 No    Any loose or missing teeth                                                                 No    Defib / AICD / Pacemaker:                                                               No    Renal Failure: No    If Yes, On dialysis?       Hx of anesthesia complications with Patient                               Never had general    Hx of anesthesia complications with family

## 2018-12-21 NOTE — H&P
mother, paternal grandfather, and paternal uncle; Diabetes in her father; Heart Disease in her maternal uncle and mother. Family Status   Relation Status    Mother (Not Specified)    Father (Not Specified)    MUnc (Not Specified)    PUnc (Not Specified)    PGF (Not Specified)         Social History  Social History     Social History    Marital status: Single     Spouse name: N/A    Number of children: N/A    Years of education: N/A     Occupational History    Not on file. Social History Main Topics    Smoking status: Never Smoker    Smokeless tobacco: Never Used    Alcohol use Not on file    Drug use: Unknown    Sexual activity: Not on file     Other Topics Concern    Not on file     Social History Narrative    No narrative on file           Hobbies: [employer] [place]    OBJECTIVE:   VITALS:  vitals were not taken for this visit. CONSTITUTIONAL: Alert and oriented times 3, no acute distress and cooperative to examination. friendly and pleasant     SKIN: rash No    HEENT: Head is normocephalic, atraumatic. EOMI, PERRLA    Oral air way :slightly narrow Yes, tonsils 2+ bilateral    NECK: neck supple, no lymphadenopathy noted, trachea midline and straight       2+ carotid, no bruit    LUNGS: Chest expands equally bilaterally upon respiration, no accessory muscle used. Ausculation reveals no adventitious breath sounds. CARDIOVASCULAR: \"Heart sounds are normal.  Regular rate and rhythm without murmur,    ABDOMEN: Bowel sounds are present in all four quadrants      GENATALIA:Deferred. NEUROLOGIC: \"CN II-XII are grossly intact. EXTREMITIES: Pitting edema:  No,  Varicose veins: No     Dorsal pedal/posterior tibial pulses palpable: Yes         Strength:  Normal       Patient Active Problem List   Diagnosis    Gestational diabetes    Dyslipidemia    Type 2 diabetes mellitus without complication, without long-term current use of insulin ([facility] [address]               IMPRESSIONS:   1.  DJD of RIGHT knee  2.  does not have any pertinent problems on file.     Domi Padron [facility]  Electronically signed 12/21/2018 at 1:27 PM       Scheduled for: [facility] Robotic RIGHT total knee arthroplasty

## 2019-01-11 ENCOUNTER — OFFICE VISIT (OUTPATIENT)
Dept: FAMILY MEDICINE CLINIC | Age: 64
End: 2019-01-11
Payer: COMMERCIAL

## 2019-01-11 VITALS
DIASTOLIC BLOOD PRESSURE: 70 MMHG | BODY MASS INDEX: 28.96 KG/M2 | HEART RATE: 89 BPM | WEIGHT: 174 LBS | SYSTOLIC BLOOD PRESSURE: 132 MMHG

## 2019-01-11 DIAGNOSIS — E11.9 TYPE 2 DIABETES MELLITUS WITHOUT COMPLICATION, WITHOUT LONG-TERM CURRENT USE OF INSULIN (HCC): Primary | ICD-10-CM

## 2019-01-11 LAB
CREATININE URINE POCT: 200
MICROALBUMIN/CREAT 24H UR: 10 MG/G{CREAT}
MICROALBUMIN/CREAT UR-RTO: <30

## 2019-01-11 PROCEDURE — 82044 UR ALBUMIN SEMIQUANTITATIVE: CPT | Performed by: FAMILY MEDICINE

## 2019-01-11 PROCEDURE — 99213 OFFICE O/P EST LOW 20 MIN: CPT | Performed by: FAMILY MEDICINE

## 2019-01-11 RX ORDER — METFORMIN HYDROCHLORIDE 500 MG/1
TABLET, EXTENDED RELEASE ORAL
Qty: 90 TABLET | Refills: 5 | Status: SHIPPED | OUTPATIENT
Start: 2019-01-11 | End: 2019-07-29 | Stop reason: SDUPTHER

## 2019-01-11 ASSESSMENT — ENCOUNTER SYMPTOMS
BACK PAIN: 0
DIARRHEA: 0
SORE THROAT: 0
VOMITING: 0
SHORTNESS OF BREATH: 0
SINUS PRESSURE: 0
NAUSEA: 0
COUGH: 0

## 2019-02-04 ENCOUNTER — TELEPHONE (OUTPATIENT)
Dept: FAMILY MEDICINE CLINIC | Age: 64
End: 2019-02-04

## 2019-02-22 ENCOUNTER — HOSPITAL ENCOUNTER (OUTPATIENT)
Age: 64
Setting detail: SPECIMEN
Discharge: HOME | End: 2019-02-22
Payer: COMMERCIAL

## 2019-02-22 DIAGNOSIS — E78.5 DYSLIPIDEMIA: ICD-10-CM

## 2019-02-22 DIAGNOSIS — E11.9 TYPE 2 DIABETES MELLITUS WITHOUT COMPLICATION, WITHOUT LONG-TERM CURRENT USE OF INSULIN (HCC): ICD-10-CM

## 2019-02-22 LAB
ALBUMIN SERPL-MCNC: 4.4 G/DL (ref 3.5–5.2)
ALBUMIN/GLOBULIN RATIO: 1.5 (ref 1–2.5)
ALP BLD-CCNC: 76 U/L (ref 35–104)
ALT SERPL-CCNC: 38 U/L (ref 5–33)
ANION GAP SERPL CALCULATED.3IONS-SCNC: 19 MMOL/L (ref 9–17)
AST SERPL-CCNC: 22 U/L
BILIRUB SERPL-MCNC: 0.34 MG/DL (ref 0.3–1.2)
BUN BLDV-MCNC: 17 MG/DL (ref 8–23)
BUN/CREAT BLD: ABNORMAL (ref 9–20)
CALCIUM SERPL-MCNC: 9.6 MG/DL (ref 8.6–10.4)
CHLORIDE BLD-SCNC: 104 MMOL/L (ref 98–107)
CHOLESTEROL/HDL RATIO: 4.1
CHOLESTEROL: 200 MG/DL
CO2: 21 MMOL/L (ref 20–31)
CREAT SERPL-MCNC: 0.67 MG/DL (ref 0.5–0.9)
ESTIMATED AVERAGE GLUCOSE: 174 MG/DL
GFR AFRICAN AMERICAN: >60 ML/MIN
GFR NON-AFRICAN AMERICAN: >60 ML/MIN
GFR SERPL CREATININE-BSD FRML MDRD: ABNORMAL ML/MIN/{1.73_M2}
GFR SERPL CREATININE-BSD FRML MDRD: ABNORMAL ML/MIN/{1.73_M2}
GLUCOSE BLD-MCNC: 135 MG/DL (ref 70–99)
HBA1C MFR BLD: 7.7 % (ref 4–6)
HDLC SERPL-MCNC: 49 MG/DL
LDL CHOLESTEROL: 109 MG/DL (ref 0–130)
POTASSIUM SERPL-SCNC: 4.5 MMOL/L (ref 3.7–5.3)
SODIUM BLD-SCNC: 144 MMOL/L (ref 135–144)
TOTAL PROTEIN: 7.4 G/DL (ref 6.4–8.3)
TRIGL SERPL-MCNC: 208 MG/DL
VLDLC SERPL CALC-MCNC: ABNORMAL MG/DL (ref 1–30)

## 2019-02-28 DIAGNOSIS — E11.9 TYPE 2 DIABETES MELLITUS WITHOUT COMPLICATION, WITHOUT LONG-TERM CURRENT USE OF INSULIN (HCC): Primary | ICD-10-CM

## 2019-03-05 ENCOUNTER — TELEPHONE (OUTPATIENT)
Dept: ORTHOPEDIC SURGERY | Age: 64
End: 2019-03-05

## 2019-03-05 DIAGNOSIS — Z01.818 PRE-OP TESTING: Primary | ICD-10-CM

## 2019-03-11 NOTE — CARE COORDINATION
Tc to patient for pre surgical planning. She came to class last December then had to work on blood sugar. She has it under control. Her plan remains the same as in my previous note. I told her I will  See her after surgery. Reminded her of her pat visit.   Elina rodriguez has all equipment

## 2019-04-02 ENCOUNTER — HOSPITAL ENCOUNTER (OUTPATIENT)
Age: 64
Discharge: HOME | End: 2019-04-02
Payer: COMMERCIAL

## 2019-04-02 ENCOUNTER — HOSPITAL ENCOUNTER (OUTPATIENT)
Dept: PREADMISSION TESTING | Age: 64
Discharge: HOME | End: 2019-04-06
Payer: COMMERCIAL

## 2019-04-02 VITALS
TEMPERATURE: 97.7 F | OXYGEN SATURATION: 98 % | RESPIRATION RATE: 18 BRPM | BODY MASS INDEX: 29.16 KG/M2 | HEART RATE: 68 BPM | SYSTOLIC BLOOD PRESSURE: 121 MMHG | HEIGHT: 65 IN | WEIGHT: 175 LBS | DIASTOLIC BLOOD PRESSURE: 73 MMHG

## 2019-04-02 LAB
ALBUMIN SERPL-MCNC: 4.6 G/DL (ref 3.5–5.2)
ALBUMIN/GLOBULIN RATIO: 1.4 (ref 1–2.5)
ALP BLD-CCNC: 82 U/L (ref 35–104)
ALT SERPL-CCNC: 38 U/L (ref 5–33)
ANION GAP SERPL CALCULATED.3IONS-SCNC: 20 MMOL/L (ref 9–17)
AST SERPL-CCNC: 21 U/L
BILIRUB SERPL-MCNC: 0.36 MG/DL (ref 0.3–1.2)
BILIRUBIN URINE: NEGATIVE
BUN BLDV-MCNC: 12 MG/DL (ref 8–23)
BUN/CREAT BLD: ABNORMAL (ref 9–20)
CALCIUM SERPL-MCNC: 9.7 MG/DL (ref 8.6–10.4)
CHLORIDE BLD-SCNC: 107 MMOL/L (ref 98–107)
CO2: 20 MMOL/L (ref 20–31)
COLOR: YELLOW
COMMENT UA: NORMAL
CREAT SERPL-MCNC: 0.63 MG/DL (ref 0.5–0.9)
ESTIMATED AVERAGE GLUCOSE: 143 MG/DL
GFR AFRICAN AMERICAN: >60 ML/MIN
GFR NON-AFRICAN AMERICAN: >60 ML/MIN
GFR SERPL CREATININE-BSD FRML MDRD: ABNORMAL ML/MIN/{1.73_M2}
GFR SERPL CREATININE-BSD FRML MDRD: ABNORMAL ML/MIN/{1.73_M2}
GLUCOSE BLD-MCNC: 98 MG/DL (ref 70–99)
GLUCOSE URINE: NEGATIVE
HBA1C MFR BLD: 6.6 % (ref 4–6)
HCT VFR BLD CALC: 45.7 % (ref 36.3–47.1)
HEMOGLOBIN: 14.7 G/DL (ref 11.9–15.1)
KETONES, URINE: NEGATIVE
LEUKOCYTE ESTERASE, URINE: NEGATIVE
MCH RBC QN AUTO: 28.6 PG (ref 25.2–33.5)
MCHC RBC AUTO-ENTMCNC: 32.2 G/DL (ref 28.4–34.8)
MCV RBC AUTO: 88.9 FL (ref 82.6–102.9)
NITRITE, URINE: NEGATIVE
NRBC AUTOMATED: 0 PER 100 WBC
PDW BLD-RTO: 12.9 % (ref 11.8–14.4)
PH UA: 5 (ref 5–8)
PLATELET # BLD: 293 K/UL (ref 138–453)
PMV BLD AUTO: 11.7 FL (ref 8.1–13.5)
POTASSIUM SERPL-SCNC: 4.1 MMOL/L (ref 3.7–5.3)
PROTEIN UA: NEGATIVE
RBC # BLD: 5.14 M/UL (ref 3.95–5.11)
SODIUM BLD-SCNC: 147 MMOL/L (ref 135–144)
SPECIFIC GRAVITY UA: 1.01 (ref 1–1.03)
TOTAL PROTEIN: 7.9 G/DL (ref 6.4–8.3)
TURBIDITY: CLEAR
URINE HGB: NEGATIVE
UROBILINOGEN, URINE: NORMAL
WBC # BLD: 8.4 K/UL (ref 3.5–11.3)

## 2019-04-02 PROCEDURE — 83036 HEMOGLOBIN GLYCOSYLATED A1C: CPT

## 2019-04-02 PROCEDURE — 80053 COMPREHEN METABOLIC PANEL: CPT

## 2019-04-02 PROCEDURE — 81003 URINALYSIS AUTO W/O SCOPE: CPT

## 2019-04-02 PROCEDURE — 87641 MR-STAPH DNA AMP PROBE: CPT

## 2019-04-02 PROCEDURE — 85027 COMPLETE CBC AUTOMATED: CPT

## 2019-04-02 PROCEDURE — 36415 COLL VENOUS BLD VENIPUNCTURE: CPT

## 2019-04-02 RX ORDER — ACETAMINOPHEN 500 MG
1000 TABLET ORAL DAILY PRN
Status: ON HOLD | COMMUNITY
End: 2019-04-10 | Stop reason: HOSPADM

## 2019-04-02 RX ORDER — SODIUM CHLORIDE, SODIUM LACTATE, POTASSIUM CHLORIDE, CALCIUM CHLORIDE 600; 310; 30; 20 MG/100ML; MG/100ML; MG/100ML; MG/100ML
1000 INJECTION, SOLUTION INTRAVENOUS CONTINUOUS
Status: CANCELLED | OUTPATIENT
Start: 2019-04-02

## 2019-04-02 ASSESSMENT — PAIN DESCRIPTION - LOCATION: LOCATION: KNEE

## 2019-04-02 ASSESSMENT — PAIN DESCRIPTION - DESCRIPTORS: DESCRIPTORS: CONSTANT

## 2019-04-02 ASSESSMENT — PAIN DESCRIPTION - ORIENTATION: ORIENTATION: RIGHT;LEFT

## 2019-04-02 ASSESSMENT — PAIN DESCRIPTION - ONSET: ONSET: ON-GOING

## 2019-04-02 ASSESSMENT — PAIN DESCRIPTION - FREQUENCY: FREQUENCY: CONTINUOUS

## 2019-04-02 ASSESSMENT — PAIN DESCRIPTION - PROGRESSION: CLINICAL_PROGRESSION: NOT CHANGED

## 2019-04-02 ASSESSMENT — PAIN DESCRIPTION - PAIN TYPE: TYPE: CHRONIC PAIN

## 2019-04-02 ASSESSMENT — PAIN SCALES - GENERAL: PAINLEVEL_OUTOF10: 2

## 2019-04-02 NOTE — H&P
History and Physical    Pt Name: Sarah Jimenez  MRN: [id number]  YOB: 1955  Date of evaluation: 4/2/2019  Primary Care Physician: Shital Rabago MD  Patient evaluated at the request of  Dr. Conrad VELASQUEZ    Reason for evaluation:  Right knee DJD  SUBJECTIVE:   History of Chief Complaint:  According to chart note in 2018       Kathia Haley is a 59 y.o. female, who is being seen at the request of Shital Rabago MD for consultation and evaluation of bilateral knee pain that has been present for approximately 6 months. The patient states that the right is worse than the left.  The patient does not recall a specific injury.  The pain improves with resting and icing.  The pain worsens with  stair climbing and arising from a seated position as well as when she is hiking with her . The patient has pain with walking down hill when hiking and she does see some instability with the right knee.  The patient has not had a previous corticosteroid injection.  The patient has tried home exercise program as well as activity modification.  The patient has tried oral NSAIDs for this problem previously but has not seen any relief with them. The patient has recently decided to go to the gym to work on strengthening of the knees. Past Medical History      has a past medical history of Arthritis, Gestational diabetes, Hyperlipidemia, Type 2 diabetes mellitus ([address] and Wears glasses. Past Surgical History   has no past surgical history on file. Medications   Scheduled Meds:  Current Outpatient Rx   Medication Sig Dispense Refill    acetaminophen (TYLENOL) 500 MG tablet Take 1,000 mg by mouth daily as needed for Pain      SITagliptin (JANUVIA) 100 MG tablet Take 1 tablet by mouth daily 42 tablet 0    metFORMIN (GLUCOPHAGE XR) 500 MG extended release tablet Take two tabs in the morning and one tab in the evening.  (Patient taking differently: Take 500 mg by mouth nightly NIGHTLY WITH FOOD) 90 tablet 5    metFORMIN (GLUCOPHAGE-XR) 500 MG extended release tablet Take 2 tablets by mouth daily (with breakfast) 60 tablet 5    atorvastatin (LIPITOR) 20 MG tablet take 1 tablet by mouth daily 30 tablet 11     Continuous Infusions:  PRN Meds:. Allergies  has No Known Allergies. Family History    family history includes Cancer in her father, maternal uncle, mother, paternal grandfather, and paternal uncle; Diabetes in her father; Heart Disease in her maternal uncle and mother; High Blood Pressure in her mother. Family Status   Relation Name Status    Mother      Father      MUnc  (Not Specified)    PUnc  (Not Specified)    PGF  (Not Specified)         Social History  Social History     Socioeconomic History    Marital status: Single     Spouse name: Not on file    Number of children: Not on file    Years of education: Not on file    Highest education level: Not on file   Occupational History    Not on file   Social Needs    Financial resource strain: Not on file    Food insecurity:     Worry: Not on file     Inability: Not on file    Transportation needs:     Medical: Not on file     Non-medical: Not on file   Tobacco Use    Smoking status: Never Smoker    Smokeless tobacco: Never Used   Substance and Sexual Activity    Alcohol use:  Yes     Alcohol/week: 1.2 oz     Types: 2 Glasses of wine per week     Comment: ONCE A WEEK    Drug use: No    Sexual activity: Not on file   Lifestyle    Physical activity:     Days per week: Not on file     Minutes per session: Not on file    Stress: Not on file   Relationships    Social connections:     Talks on phone: Not on file     Gets together: Not on file     Attends Alevism service: Not on file     Active member of club or organization: Not on file     Attends meetings of clubs or organizations: Not on file     Relationship status: Not on file    Intimate partner violence:     Fear of current or ex partner: Not on file Emotionally abused: Not on file     Physically abused: Not on file     Forced sexual activity: Not on file   Other Topics Concern    Not on file   Social History Narrative    Not on file               Hobbies:  RV , hiking     OBJECTIVE:   VITALS:  height is 5' 5\" (1.651 m) and weight is 175 lb (79.4 kg). Her oral temperature is 97.7 °F (36.5 °C). Her blood pressure is 121/73 and her pulse is 68. Her respiration is 18 and oxygen saturation is 98%. CONSTITUTIONAL: Alert and oriented times 3, no acute distress and cooperative to examination. friendly and pleasant     SKIN: rash No    HEENT: Head is normocephalic, atraumatic. EOMI, PERRLA    Oral air way :slightly narrow Yes    NECK: neck supple, no lymphadenopathy noted, trachea midline and straight       2+ carotid, no bruit    LUNGS: Chest expands equally bilaterally upon respiration, no accessory muscle used. Ausculation reveals no adventitious breath sounds. CARDIOVASCULAR: \"Heart sounds are normal.  Regular rate and rhythm without murmur,    ABDOMEN: Bowel sounds are present in all four quadrants      GENATALIA:Deferred. NEUROLOGIC: \"CN II-XII are grossly intact. EXTREMITIES: Pitting edema:  No,  Varicose veins: No     Dorsal pedal/posterior tibial pulses palpable: Yes         Strength:  Normal       Patient Active Problem List   Diagnosis    Gestational diabetes    Dyslipidemia    Type 2 diabetes mellitus without complication, without long-term current use of insulin ([address]               IMPRESSIONS:   1. Right knee DJD   2. does not have any pertinent problems on file.     [address] [facility]  Electronically signed 4/2/2019 at 2:31 PM       Scheduled for:  Right navio total knee

## 2019-04-02 NOTE — ANESTHESIA PRE-OP
Anesthesia Focused Assessment    STOP-BANG Sleep Apnea Questionnaire    Obstructive Sleep Apnea:                                                                 No     Machine used:                                                                                  No            SNORE loudly (heard through closed doors)? No      TIRED, fatigued, sleepy during daytime? No      OBSERVED stopping breathing during sleep? No      High blood PRESSURE being treated? No      BMI over 35? Yes  AGE over 48? Yes  NECK circumference over 16\"? No  GENDER (male)? No                High risk 5-8  Intermediate risk 3-4  Low risk 0-2    Total 3  High risk 5-8  Intermediate risk 3-4  Low risk 0-2      Type 1 DM:                                                                                        No    TYPE 2:                                                                                             Yes    If yes, insulin dependent? No    Coronary Artery Disease :                                                                No        Active smoker                                                                                   No    Drinks Alcohol                                                                                   Yes    Dentition: Dentures                                                                            No              Partial                                                                                                 No    Any loose or missing teeth                                                                 No    Defib / AICD / Pacemaker:                                                               No    Renal Failure: No    If Yes, On dialysis?       Hx of anesthesia complications with Patient                               Never had a GA    Hx of anesthesia complications with family

## 2019-04-03 LAB
MRSA, DNA, NASAL: NORMAL
SPECIMEN DESCRIPTION: NORMAL

## 2019-04-08 DIAGNOSIS — M17.11 ARTHRITIS OF RIGHT KNEE: Primary | ICD-10-CM

## 2019-04-09 ENCOUNTER — ANESTHESIA EVENT (OUTPATIENT)
Dept: OPERATING ROOM | Age: 64
DRG: 470 | End: 2019-04-09
Payer: COMMERCIAL

## 2019-04-09 ENCOUNTER — ANESTHESIA (OUTPATIENT)
Dept: OPERATING ROOM | Age: 64
DRG: 470 | End: 2019-04-09
Payer: COMMERCIAL

## 2019-04-09 ENCOUNTER — HOSPITAL ENCOUNTER (INPATIENT)
Age: 64
LOS: 1 days | Discharge: HOME | DRG: 470 | End: 2019-04-10
Attending: ORTHOPAEDIC SURGERY | Admitting: ORTHOPAEDIC SURGERY
Payer: COMMERCIAL

## 2019-04-09 ENCOUNTER — APPOINTMENT (OUTPATIENT)
Dept: GENERAL RADIOLOGY | Age: 64
DRG: 470 | End: 2019-04-09
Attending: ORTHOPAEDIC SURGERY
Payer: COMMERCIAL

## 2019-04-09 VITALS — OXYGEN SATURATION: 93 % | SYSTOLIC BLOOD PRESSURE: 95 MMHG | TEMPERATURE: 97.5 F | DIASTOLIC BLOOD PRESSURE: 57 MMHG

## 2019-04-09 DIAGNOSIS — Z96.651 TOTAL KNEE REPLACEMENT STATUS, RIGHT: ICD-10-CM

## 2019-04-09 DIAGNOSIS — G89.18 POST-OP PAIN: Primary | ICD-10-CM

## 2019-04-09 LAB
GLUCOSE BLD-MCNC: 113 MG/DL (ref 65–105)
GLUCOSE BLD-MCNC: 178 MG/DL (ref 65–105)

## 2019-04-09 PROCEDURE — 6360000002 HC RX W HCPCS: Performed by: STUDENT IN AN ORGANIZED HEALTH CARE EDUCATION/TRAINING PROGRAM

## 2019-04-09 PROCEDURE — 3700000000 HC ANESTHESIA ATTENDED CARE: Performed by: ORTHOPAEDIC SURGERY

## 2019-04-09 PROCEDURE — 3600000004 HC SURGERY LEVEL 4 BASE: Performed by: ORTHOPAEDIC SURGERY

## 2019-04-09 PROCEDURE — 2580000003 HC RX 258: Performed by: ANESTHESIOLOGY

## 2019-04-09 PROCEDURE — 2580000003 HC RX 258: Performed by: STUDENT IN AN ORGANIZED HEALTH CARE EDUCATION/TRAINING PROGRAM

## 2019-04-09 PROCEDURE — 1200000000 HC SEMI PRIVATE

## 2019-04-09 PROCEDURE — 2500000003 HC RX 250 WO HCPCS: Performed by: STUDENT IN AN ORGANIZED HEALTH CARE EDUCATION/TRAINING PROGRAM

## 2019-04-09 PROCEDURE — 82947 ASSAY GLUCOSE BLOOD QUANT: CPT

## 2019-04-09 PROCEDURE — 6370000000 HC RX 637 (ALT 250 FOR IP): Performed by: STUDENT IN AN ORGANIZED HEALTH CARE EDUCATION/TRAINING PROGRAM

## 2019-04-09 PROCEDURE — 6360000002 HC RX W HCPCS

## 2019-04-09 PROCEDURE — C1713 ANCHOR/SCREW BN/BN,TIS/BN: HCPCS | Performed by: ORTHOPAEDIC SURGERY

## 2019-04-09 PROCEDURE — 73562 X-RAY EXAM OF KNEE 3: CPT

## 2019-04-09 PROCEDURE — 20985 CPTR-ASST DIR MS PX: CPT | Performed by: ORTHOPAEDIC SURGERY

## 2019-04-09 PROCEDURE — 7100000001 HC PACU RECOVERY - ADDTL 15 MIN: Performed by: ORTHOPAEDIC SURGERY

## 2019-04-09 PROCEDURE — 2709999900 HC NON-CHARGEABLE SUPPLY: Performed by: ORTHOPAEDIC SURGERY

## 2019-04-09 PROCEDURE — 64447 NJX AA&/STRD FEMORAL NRV IMG: CPT | Performed by: ANESTHESIOLOGY

## 2019-04-09 PROCEDURE — 2720000010 HC SURG SUPPLY STERILE: Performed by: ORTHOPAEDIC SURGERY

## 2019-04-09 PROCEDURE — 0SRC0J9 REPLACEMENT OF RIGHT KNEE JOINT WITH SYNTHETIC SUBSTITUTE, CEMENTED, OPEN APPROACH: ICD-10-PCS | Performed by: ORTHOPAEDIC SURGERY

## 2019-04-09 PROCEDURE — 6360000002 HC RX W HCPCS: Performed by: NURSE ANESTHETIST, CERTIFIED REGISTERED

## 2019-04-09 PROCEDURE — 3700000001 HC ADD 15 MINUTES (ANESTHESIA): Performed by: ORTHOPAEDIC SURGERY

## 2019-04-09 PROCEDURE — 27447 TOTAL KNEE ARTHROPLASTY: CPT | Performed by: ORTHOPAEDIC SURGERY

## 2019-04-09 PROCEDURE — 2500000003 HC RX 250 WO HCPCS: Performed by: ANESTHESIOLOGY

## 2019-04-09 PROCEDURE — 6360000002 HC RX W HCPCS: Performed by: ANESTHESIOLOGY

## 2019-04-09 PROCEDURE — 2500000003 HC RX 250 WO HCPCS: Performed by: NURSE ANESTHETIST, CERTIFIED REGISTERED

## 2019-04-09 PROCEDURE — 7100000000 HC PACU RECOVERY - FIRST 15 MIN: Performed by: ORTHOPAEDIC SURGERY

## 2019-04-09 PROCEDURE — C1776 JOINT DEVICE (IMPLANTABLE): HCPCS | Performed by: ORTHOPAEDIC SURGERY

## 2019-04-09 PROCEDURE — 3600000014 HC SURGERY LEVEL 4 ADDTL 15MIN: Performed by: ORTHOPAEDIC SURGERY

## 2019-04-09 PROCEDURE — 2580000003 HC RX 258: Performed by: ORTHOPAEDIC SURGERY

## 2019-04-09 DEVICE — JOURNEY TIBIAL BASEPLATE NONPOROUS                                    RIGHT SIZE 3
Type: IMPLANTABLE DEVICE | Site: KNEE | Status: FUNCTIONAL
Brand: JOURNEY

## 2019-04-09 DEVICE — JOURNEY II BCS XLPE ARTICULAR                                    INSERT SIZE 3-4 RIGHT 9MM
Type: IMPLANTABLE DEVICE | Site: KNEE | Status: FUNCTIONAL
Brand: JOURNEY

## 2019-04-09 DEVICE — CEMENT BONE 40GM HI VISC PALACOS R: Type: IMPLANTABLE DEVICE | Site: KNEE | Status: FUNCTIONAL

## 2019-04-09 DEVICE — CEMENT BNE 40GM HI VISC RADPQ FOR REV SURG: Type: IMPLANTABLE DEVICE | Site: KNEE | Status: FUNCTIONAL

## 2019-04-09 DEVICE — COMPONENT TOT KNEE CAPPED ADV OXIN NP JOURNEY II: Type: IMPLANTABLE DEVICE | Site: KNEE | Status: FUNCTIONAL

## 2019-04-09 DEVICE — JOURNEY II BCS FEMORAL OXINIUM                                    RIGHT SIZE 6
Type: IMPLANTABLE DEVICE | Site: KNEE | Status: FUNCTIONAL
Brand: JOURNEY

## 2019-04-09 RX ORDER — PREGABALIN 75 MG/1
75 CAPSULE ORAL ONCE
Status: COMPLETED | OUTPATIENT
Start: 2019-04-09 | End: 2019-04-09

## 2019-04-09 RX ORDER — BUPIVACAINE HYDROCHLORIDE 7.5 MG/ML
INJECTION, SOLUTION INTRASPINAL PRN
Status: DISCONTINUED | OUTPATIENT
Start: 2019-04-09 | End: 2019-04-09 | Stop reason: SDUPTHER

## 2019-04-09 RX ORDER — ONDANSETRON 2 MG/ML
4 INJECTION INTRAMUSCULAR; INTRAVENOUS ONCE
Status: CANCELLED | OUTPATIENT
Start: 2019-04-09 | End: 2019-04-09

## 2019-04-09 RX ORDER — ATORVASTATIN CALCIUM 20 MG/1
20 TABLET, FILM COATED ORAL NIGHTLY
Status: DISCONTINUED | OUTPATIENT
Start: 2019-04-09 | End: 2019-04-10 | Stop reason: HOSPADM

## 2019-04-09 RX ORDER — SODIUM CHLORIDE 0.9 % (FLUSH) 0.9 %
10 SYRINGE (ML) INJECTION PRN
Status: DISCONTINUED | OUTPATIENT
Start: 2019-04-09 | End: 2019-04-10 | Stop reason: HOSPADM

## 2019-04-09 RX ORDER — ASPIRIN 81 MG/1
81 TABLET ORAL 2 TIMES DAILY
Status: DISCONTINUED | OUTPATIENT
Start: 2019-04-09 | End: 2019-04-10 | Stop reason: HOSPADM

## 2019-04-09 RX ORDER — SODIUM CHLORIDE 0.9 % (FLUSH) 0.9 %
10 SYRINGE (ML) INJECTION EVERY 12 HOURS SCHEDULED
Status: CANCELLED | OUTPATIENT
Start: 2019-04-09

## 2019-04-09 RX ORDER — TRAMADOL HYDROCHLORIDE 50 MG/1
50 TABLET ORAL EVERY 8 HOURS SCHEDULED
Status: DISCONTINUED | OUTPATIENT
Start: 2019-04-09 | End: 2019-04-10 | Stop reason: HOSPADM

## 2019-04-09 RX ORDER — PROPOFOL 10 MG/ML
INJECTION, EMULSION INTRAVENOUS PRN
Status: DISCONTINUED | OUTPATIENT
Start: 2019-04-09 | End: 2019-04-09 | Stop reason: SDUPTHER

## 2019-04-09 RX ORDER — SODIUM CHLORIDE, SODIUM LACTATE, POTASSIUM CHLORIDE, CALCIUM CHLORIDE 600; 310; 30; 20 MG/100ML; MG/100ML; MG/100ML; MG/100ML
1000 INJECTION, SOLUTION INTRAVENOUS CONTINUOUS
Status: DISCONTINUED | OUTPATIENT
Start: 2019-04-09 | End: 2019-04-09

## 2019-04-09 RX ORDER — ACETAMINOPHEN 500 MG
1000 TABLET ORAL ONCE
Status: COMPLETED | OUTPATIENT
Start: 2019-04-09 | End: 2019-04-09

## 2019-04-09 RX ORDER — OXYCODONE HYDROCHLORIDE 5 MG/1
10 TABLET ORAL EVERY 4 HOURS PRN
Status: DISCONTINUED | OUTPATIENT
Start: 2019-04-09 | End: 2019-04-10 | Stop reason: HOSPADM

## 2019-04-09 RX ORDER — LIDOCAINE HYDROCHLORIDE 10 MG/ML
INJECTION, SOLUTION EPIDURAL; INFILTRATION; INTRACAUDAL; PERINEURAL PRN
Status: DISCONTINUED | OUTPATIENT
Start: 2019-04-09 | End: 2019-04-09 | Stop reason: SDUPTHER

## 2019-04-09 RX ORDER — KETOROLAC TROMETHAMINE 30 MG/ML
30 INJECTION, SOLUTION INTRAMUSCULAR; INTRAVENOUS EVERY 6 HOURS
Status: DISCONTINUED | OUTPATIENT
Start: 2019-04-09 | End: 2019-04-09

## 2019-04-09 RX ORDER — MEPERIDINE HYDROCHLORIDE 50 MG/ML
12.5 INJECTION INTRAMUSCULAR; INTRAVENOUS; SUBCUTANEOUS EVERY 5 MIN PRN
Status: DISCONTINUED | OUTPATIENT
Start: 2019-04-09 | End: 2019-04-09 | Stop reason: HOSPADM

## 2019-04-09 RX ORDER — FENTANYL CITRATE 50 UG/ML
INJECTION, SOLUTION INTRAMUSCULAR; INTRAVENOUS
Status: COMPLETED
Start: 2019-04-09 | End: 2019-04-09

## 2019-04-09 RX ORDER — MAGNESIUM HYDROXIDE 1200 MG/15ML
LIQUID ORAL CONTINUOUS PRN
Status: COMPLETED | OUTPATIENT
Start: 2019-04-09 | End: 2019-04-09

## 2019-04-09 RX ORDER — FENTANYL CITRATE 50 UG/ML
100 INJECTION, SOLUTION INTRAMUSCULAR; INTRAVENOUS ONCE
Status: COMPLETED | OUTPATIENT
Start: 2019-04-09 | End: 2019-04-09

## 2019-04-09 RX ORDER — MIDAZOLAM HYDROCHLORIDE 1 MG/ML
INJECTION INTRAMUSCULAR; INTRAVENOUS PRN
Status: DISCONTINUED | OUTPATIENT
Start: 2019-04-09 | End: 2019-04-09 | Stop reason: SDUPTHER

## 2019-04-09 RX ORDER — ROPIVACAINE HYDROCHLORIDE 2 MG/ML
INJECTION, SOLUTION EPIDURAL; INFILTRATION; PERINEURAL
Status: COMPLETED | OUTPATIENT
Start: 2019-04-09 | End: 2019-04-09

## 2019-04-09 RX ORDER — KETOROLAC TROMETHAMINE 30 MG/ML
INJECTION, SOLUTION INTRAMUSCULAR; INTRAVENOUS PRN
Status: DISCONTINUED | OUTPATIENT
Start: 2019-04-09 | End: 2019-04-09 | Stop reason: SDUPTHER

## 2019-04-09 RX ORDER — OXYCODONE HYDROCHLORIDE 5 MG/1
5 TABLET ORAL EVERY 4 HOURS PRN
Status: DISCONTINUED | OUTPATIENT
Start: 2019-04-09 | End: 2019-04-10 | Stop reason: HOSPADM

## 2019-04-09 RX ORDER — MIDAZOLAM HYDROCHLORIDE 1 MG/ML
2 INJECTION INTRAMUSCULAR; INTRAVENOUS
Status: CANCELLED | OUTPATIENT
Start: 2019-04-09 | End: 2019-04-09

## 2019-04-09 RX ORDER — DEXAMETHASONE SODIUM PHOSPHATE 10 MG/ML
10 INJECTION INTRAMUSCULAR; INTRAVENOUS ONCE
Status: COMPLETED | OUTPATIENT
Start: 2019-04-09 | End: 2019-04-09

## 2019-04-09 RX ORDER — MIDAZOLAM HYDROCHLORIDE 1 MG/ML
INJECTION INTRAMUSCULAR; INTRAVENOUS
Status: COMPLETED
Start: 2019-04-09 | End: 2019-04-09

## 2019-04-09 RX ORDER — FAMOTIDINE 20 MG/1
20 TABLET, FILM COATED ORAL 2 TIMES DAILY
Status: DISCONTINUED | OUTPATIENT
Start: 2019-04-09 | End: 2019-04-10 | Stop reason: HOSPADM

## 2019-04-09 RX ORDER — SODIUM CHLORIDE 9 MG/ML
INJECTION, SOLUTION INTRAVENOUS CONTINUOUS
Status: DISCONTINUED | OUTPATIENT
Start: 2019-04-09 | End: 2019-04-10

## 2019-04-09 RX ORDER — GABAPENTIN 300 MG/1
300 CAPSULE ORAL NIGHTLY
Status: DISCONTINUED | OUTPATIENT
Start: 2019-04-09 | End: 2019-04-10 | Stop reason: HOSPADM

## 2019-04-09 RX ORDER — ONDANSETRON 2 MG/ML
4 INJECTION INTRAMUSCULAR; INTRAVENOUS EVERY 6 HOURS PRN
Status: DISCONTINUED | OUTPATIENT
Start: 2019-04-09 | End: 2019-04-10 | Stop reason: HOSPADM

## 2019-04-09 RX ORDER — LIDOCAINE HYDROCHLORIDE 10 MG/ML
INJECTION, SOLUTION EPIDURAL; INFILTRATION; INTRACAUDAL; PERINEURAL PRN
Status: DISCONTINUED | OUTPATIENT
Start: 2019-04-09 | End: 2019-04-09

## 2019-04-09 RX ORDER — KETOROLAC TROMETHAMINE 30 MG/ML
30 INJECTION, SOLUTION INTRAMUSCULAR; INTRAVENOUS EVERY 6 HOURS
Status: DISCONTINUED | OUTPATIENT
Start: 2019-04-09 | End: 2019-04-10 | Stop reason: HOSPADM

## 2019-04-09 RX ORDER — SODIUM CHLORIDE 0.9 % (FLUSH) 0.9 %
10 SYRINGE (ML) INJECTION PRN
Status: CANCELLED | OUTPATIENT
Start: 2019-04-09

## 2019-04-09 RX ORDER — MIDAZOLAM HYDROCHLORIDE 1 MG/ML
2 INJECTION INTRAMUSCULAR; INTRAVENOUS ONCE
Status: COMPLETED | OUTPATIENT
Start: 2019-04-09 | End: 2019-04-09

## 2019-04-09 RX ORDER — PROPOFOL 10 MG/ML
INJECTION, EMULSION INTRAVENOUS CONTINUOUS PRN
Status: DISCONTINUED | OUTPATIENT
Start: 2019-04-09 | End: 2019-04-09 | Stop reason: SDUPTHER

## 2019-04-09 RX ORDER — METFORMIN HYDROCHLORIDE 500 MG/1
1000 TABLET, EXTENDED RELEASE ORAL
Status: DISCONTINUED | OUTPATIENT
Start: 2019-04-10 | End: 2019-04-10 | Stop reason: HOSPADM

## 2019-04-09 RX ORDER — SENNA AND DOCUSATE SODIUM 50; 8.6 MG/1; MG/1
1 TABLET, FILM COATED ORAL 2 TIMES DAILY
Status: DISCONTINUED | OUTPATIENT
Start: 2019-04-09 | End: 2019-04-10 | Stop reason: HOSPADM

## 2019-04-09 RX ORDER — ACETAMINOPHEN 500 MG
1000 TABLET ORAL EVERY 6 HOURS
Status: DISCONTINUED | OUTPATIENT
Start: 2019-04-09 | End: 2019-04-10 | Stop reason: HOSPADM

## 2019-04-09 RX ORDER — FENTANYL CITRATE 50 UG/ML
INJECTION, SOLUTION INTRAMUSCULAR; INTRAVENOUS PRN
Status: DISCONTINUED | OUTPATIENT
Start: 2019-04-09 | End: 2019-04-09 | Stop reason: SDUPTHER

## 2019-04-09 RX ORDER — FENTANYL CITRATE 50 UG/ML
50 INJECTION, SOLUTION INTRAMUSCULAR; INTRAVENOUS EVERY 5 MIN PRN
Status: DISCONTINUED | OUTPATIENT
Start: 2019-04-09 | End: 2019-04-09 | Stop reason: HOSPADM

## 2019-04-09 RX ORDER — SODIUM CHLORIDE 0.9 % (FLUSH) 0.9 %
10 SYRINGE (ML) INJECTION EVERY 12 HOURS SCHEDULED
Status: DISCONTINUED | OUTPATIENT
Start: 2019-04-09 | End: 2019-04-10 | Stop reason: HOSPADM

## 2019-04-09 RX ORDER — DOCUSATE SODIUM 100 MG/1
100 CAPSULE, LIQUID FILLED ORAL 2 TIMES DAILY
Status: DISCONTINUED | OUTPATIENT
Start: 2019-04-09 | End: 2019-04-10 | Stop reason: HOSPADM

## 2019-04-09 RX ADMIN — FENTANYL CITRATE 25 MCG: 50 INJECTION INTRAMUSCULAR; INTRAVENOUS at 14:10

## 2019-04-09 RX ADMIN — TRANEXAMIC ACID 1000 MG: 100 INJECTION, SOLUTION INTRAVENOUS at 14:25

## 2019-04-09 RX ADMIN — PROPOFOL 150 MCG/KG/MIN: 10 INJECTION, EMULSION INTRAVENOUS at 14:22

## 2019-04-09 RX ADMIN — TRAMADOL HYDROCHLORIDE 50 MG: 50 TABLET, FILM COATED ORAL at 22:31

## 2019-04-09 RX ADMIN — Medication 27 ML: at 13:04

## 2019-04-09 RX ADMIN — Medication 2 G: at 22:05

## 2019-04-09 RX ADMIN — GABAPENTIN 300 MG: 300 CAPSULE ORAL at 22:04

## 2019-04-09 RX ADMIN — DOCUSATE SODIUM 100 MG: 100 CAPSULE, LIQUID FILLED ORAL at 22:04

## 2019-04-09 RX ADMIN — ROPIVACAINE HYDROCHLORIDE 15 ML: 2 INJECTION, SOLUTION EPIDURAL; INFILTRATION at 13:17

## 2019-04-09 RX ADMIN — FENTANYL CITRATE 25 MCG: 50 INJECTION INTRAMUSCULAR; INTRAVENOUS at 14:18

## 2019-04-09 RX ADMIN — Medication 2 G: at 14:21

## 2019-04-09 RX ADMIN — FENTANYL CITRATE 25 MCG: 50 INJECTION INTRAMUSCULAR; INTRAVENOUS at 16:30

## 2019-04-09 RX ADMIN — BUPIVACAINE HYDROCHLORIDE IN DEXTROSE 1.8 ML: 7.5 INJECTION, SOLUTION SUBARACHNOID at 14:16

## 2019-04-09 RX ADMIN — LIDOCAINE HYDROCHLORIDE 50 MG: 10 INJECTION, SOLUTION EPIDURAL; INFILTRATION; INTRACAUDAL; PERINEURAL at 14:22

## 2019-04-09 RX ADMIN — LIDOCAINE HYDROCHLORIDE 20 MG: 10 INJECTION, SOLUTION EPIDURAL; INFILTRATION; INTRACAUDAL; PERINEURAL at 14:15

## 2019-04-09 RX ADMIN — SODIUM CHLORIDE, POTASSIUM CHLORIDE, SODIUM LACTATE AND CALCIUM CHLORIDE: 600; 310; 30; 20 INJECTION, SOLUTION INTRAVENOUS at 15:10

## 2019-04-09 RX ADMIN — MIDAZOLAM HYDROCHLORIDE 1 MG: 1 INJECTION, SOLUTION INTRAMUSCULAR; INTRAVENOUS at 14:10

## 2019-04-09 RX ADMIN — KETOROLAC TROMETHAMINE 30 MG: 30 INJECTION INTRAMUSCULAR; INTRAVENOUS at 16:18

## 2019-04-09 RX ADMIN — PROPOFOL 40 MG: 10 INJECTION, EMULSION INTRAVENOUS at 14:21

## 2019-04-09 RX ADMIN — FENTANYL CITRATE 100 MCG: 50 INJECTION, SOLUTION INTRAMUSCULAR; INTRAVENOUS at 12:47

## 2019-04-09 RX ADMIN — ASPIRIN 81 MG: 81 TABLET ORAL at 22:04

## 2019-04-09 RX ADMIN — MIDAZOLAM HYDROCHLORIDE 1 MG: 1 INJECTION, SOLUTION INTRAMUSCULAR; INTRAVENOUS at 14:18

## 2019-04-09 RX ADMIN — KETOROLAC TROMETHAMINE 30 MG: 30 INJECTION, SOLUTION INTRAMUSCULAR; INTRAVENOUS at 22:05

## 2019-04-09 RX ADMIN — ACETAMINOPHEN 1000 MG: 500 TABLET ORAL at 12:21

## 2019-04-09 RX ADMIN — MIDAZOLAM HYDROCHLORIDE 2 MG: 1 INJECTION INTRAMUSCULAR; INTRAVENOUS at 12:47

## 2019-04-09 RX ADMIN — FENTANYL CITRATE 25 MCG: 50 INJECTION INTRAMUSCULAR; INTRAVENOUS at 16:03

## 2019-04-09 RX ADMIN — DEXAMETHASONE SODIUM PHOSPHATE 10 MG: 10 INJECTION INTRAMUSCULAR; INTRAVENOUS at 12:21

## 2019-04-09 RX ADMIN — ACETAMINOPHEN 1000 MG: 500 TABLET ORAL at 22:04

## 2019-04-09 RX ADMIN — PREGABALIN 75 MG: 75 CAPSULE ORAL at 12:22

## 2019-04-09 RX ADMIN — FAMOTIDINE 20 MG: 20 TABLET, FILM COATED ORAL at 22:04

## 2019-04-09 RX ADMIN — SODIUM CHLORIDE: 9 INJECTION, SOLUTION INTRAVENOUS at 18:45

## 2019-04-09 RX ADMIN — MIDAZOLAM HYDROCHLORIDE 2 MG: 1 INJECTION, SOLUTION INTRAMUSCULAR; INTRAVENOUS at 12:47

## 2019-04-09 RX ADMIN — SODIUM CHLORIDE, POTASSIUM CHLORIDE, SODIUM LACTATE AND CALCIUM CHLORIDE 1000 ML: 600; 310; 30; 20 INJECTION, SOLUTION INTRAVENOUS at 12:23

## 2019-04-09 RX ADMIN — Medication 500 ML: at 17:27

## 2019-04-09 RX ADMIN — TRANEXAMIC ACID 1000 MG: 100 INJECTION, SOLUTION INTRAVENOUS at 16:28

## 2019-04-09 RX ADMIN — SENNOSIDES AND DOCUSATE SODIUM 1 TABLET: 8.6; 5 TABLET ORAL at 22:06

## 2019-04-09 ASSESSMENT — PULMONARY FUNCTION TESTS
PIF_VALUE: 0
PIF_VALUE: 1
PIF_VALUE: 0
PIF_VALUE: 1
PIF_VALUE: 0
PIF_VALUE: 1
PIF_VALUE: 0
PIF_VALUE: 1
PIF_VALUE: 0
PIF_VALUE: 1
PIF_VALUE: 0
PIF_VALUE: 1
PIF_VALUE: 1
PIF_VALUE: 0
PIF_VALUE: 0
PIF_VALUE: 1
PIF_VALUE: 0
PIF_VALUE: 1
PIF_VALUE: 0
PIF_VALUE: 1
PIF_VALUE: 0
PIF_VALUE: 1
PIF_VALUE: 0
PIF_VALUE: 1
PIF_VALUE: 1
PIF_VALUE: 0
PIF_VALUE: 0
PIF_VALUE: 1
PIF_VALUE: 0
PIF_VALUE: 1
PIF_VALUE: 0
PIF_VALUE: 1
PIF_VALUE: 0
PIF_VALUE: 0
PIF_VALUE: 1
PIF_VALUE: 0
PIF_VALUE: 1
PIF_VALUE: 0
PIF_VALUE: 1
PIF_VALUE: 0
PIF_VALUE: 1
PIF_VALUE: 0
PIF_VALUE: 1
PIF_VALUE: 0
PIF_VALUE: 1
PIF_VALUE: 0
PIF_VALUE: 1
PIF_VALUE: 0
PIF_VALUE: 1
PIF_VALUE: 0
PIF_VALUE: 1
PIF_VALUE: 0
PIF_VALUE: 1
PIF_VALUE: 0

## 2019-04-09 ASSESSMENT — PAIN DESCRIPTION - PAIN TYPE
TYPE: SURGICAL PAIN
TYPE: SURGICAL PAIN

## 2019-04-09 ASSESSMENT — PAIN - FUNCTIONAL ASSESSMENT: PAIN_FUNCTIONAL_ASSESSMENT: 0-10

## 2019-04-09 ASSESSMENT — PAIN DESCRIPTION - ORIENTATION
ORIENTATION: RIGHT
ORIENTATION: RIGHT

## 2019-04-09 ASSESSMENT — PAIN SCALES - GENERAL
PAINLEVEL_OUTOF10: 1
PAINLEVEL_OUTOF10: 0
PAINLEVEL_OUTOF10: 4
PAINLEVEL_OUTOF10: 3
PAINLEVEL_OUTOF10: 3
PAINLEVEL_OUTOF10: 0
PAINLEVEL_OUTOF10: 0

## 2019-04-09 ASSESSMENT — PAIN DESCRIPTION - FREQUENCY: FREQUENCY: CONTINUOUS

## 2019-04-09 ASSESSMENT — PAIN DESCRIPTION - DESCRIPTORS
DESCRIPTORS: DISCOMFORT
DESCRIPTORS: SORE

## 2019-04-09 ASSESSMENT — PAIN DESCRIPTION - LOCATION
LOCATION: KNEE
LOCATION: KNEE

## 2019-04-09 ASSESSMENT — PAIN DESCRIPTION - PROGRESSION
CLINICAL_PROGRESSION: NOT CHANGED
CLINICAL_PROGRESSION: NOT CHANGED

## 2019-04-09 ASSESSMENT — PAIN DESCRIPTION - ONSET: ONSET: ON-GOING

## 2019-04-09 NOTE — PROGRESS NOTES
PROTOCOLS  NURSING IMPLEMENTED    TOTAL JOINT DVT/PE  VENOUS THROMBOEMBOLISM PROPHYLAXIS  (Nursing Automatically Implement)    Karina Haley  [id number]  [unfilled]  4/9/19    YES DVT RISK FACTOR SCORE YES MAJOR BLEEDING RISK FACTORS SCORE     [x] 48years old or greater (1)   [] Hx. Easy Bleeding (1)      [] Heart failure (2)   [] NSAID Use in Last 5 Days (2)      [] Varicose veins - Hx. (1)   [] Gastrointestinal or Genitourinary bleeding in Last 14 Days (2)      [] Myocardial Infarction - Hx. (1)         [] Cancer - Hx. (2)         [] Atrial fibrillation - Hx. (1)         [] Ischemic Stroke - Hx. (1)         [x] Diabetes Mellitus - Hx. (1)         [] Previous DVT/PE - Hx.  (2)         [] Hormone Replacement Therapy (1)         [] Obesity (1)         [] Paralysis (1)         [] Pregnancy (1)         [] Smoking (1)                   [] Thromophilia (1)   []   Mild to Moderate Bleeding (2)      [] Total Hip Arthroplasty (1)   [] Active Bleeding (4)      [] Family history of PE or DVT? (4) (Consider the following labs to test for presence of inhibitor deficiency state:) Factor V Leiden, Prothrombin Gene Mutation, Protein S Deficiency, Protien C Deficiency, Antithrombin Deficiency   [] Malignant Hypertension (2)        [] Thrombocytopenia 20k to 100k (2)        [] Thrombocytopenia less than 20k (4)        [] Bleeding Diathesis (4)        [] \"Bloody Stick\" Epidural or Spinal (2)     TOTAL DVT SCORE   TOTAL BLEEDING SCORE      [x] CLASS A   Standard Risk DVT (0-3)    [x] CLASS X Standard Risk Bleeding (0-4)      [] CLASS B Elevated Risk DVT (greater than 3)    [] CLASS Y High Risk Bleeding (greater than 4)     FINAL MATRIX (e.g. AY)       *If allergic to ASA use Warfarin  *BY patient consider no treatment  **Consider venous filter with high risk PE  **If on Coumadin pre-op, then restart night of surgery      [x]  DVT Prophylaxis: Class AX, AY    Ecotrin 81 mg by mouth BID starting day of surgery for 6 weeks for all total

## 2019-04-09 NOTE — ANESTHESIA POSTPROCEDURE EVALUATION
Department of Anesthesiology  Postprocedure Note    Patient: Dutch Aly  MRN: [id number]  YOB: 1955  Date of evaluation: 4/9/2019  Time:  6:46 PM     Procedure Summary     Date:  04/09/19 Room / Location:  [address] OR    Anesthesia Start:  6958 Anesthesia Stop:  6911    Procedure:  NAVIO TOTAL KNEE, ZACARIAS AND NEPHEW *ADVANCED RIGHT (Right ) Diagnosis:  (RIGHT KNEE ARTHRITIS)    Surgeon:  Kelin Booth DO Responsible Provider:  Glendy Payne MD    Anesthesia Type:  MAC, regional, spinal, general ASA Status:  2          Anesthesia Type: MAC, regional, spinal, general    Kyle Phase I: Kyle Score: 10    Kyle Phase II:      Last vitals: Reviewed and per EMR flowsheets.        Anesthesia Post Evaluation    Patient location during evaluation: PACU  Patient participation: complete - patient participated  Level of consciousness: awake and alert  Pain score: 2  Airway patency: patent  Nausea & Vomiting: no vomiting and no nausea  Complications: no  Cardiovascular status: hemodynamically stable  Respiratory status: acceptable  Hydration status: stable

## 2019-04-09 NOTE — ANESTHESIA PROCEDURE NOTES
Peripheral Block    Patient location during procedure: pre-op  Start time: 4/9/2019 12:55 PM  End time: 4/9/2019 1:10 PM  Staffing  Anesthesiologist: Reyes Craven MD  Performed: anesthesiologist   Preanesthetic Checklist  Completed: patient identified, site marked, surgical consent, pre-op evaluation, timeout performed, IV checked, risks and benefits discussed, monitors and equipment checked, anesthesia consent given, oxygen available and patient being monitored  Peripheral Block  Patient position: supine  Prep: ChloraPrep  Patient monitoring: cardiac monitor, continuous pulse ox, frequent blood pressure checks and IV access  Block type: Femoral  Laterality: right  Injection technique: single-shot  Procedures: ultrasound guided  Local infiltration: lidocaine  Infiltration strength: 1 %  Dose: 3 mL  Approach to block: Low Femoral.  Provider prep: mask and sterile gloves  Local infiltration: lidocaine  Needle  Needle type: combined needle/nerve stimulator   Needle gauge: 18 G  Needle length: 10 cm  Needle localization: ultrasound guidance  Needle insertion depth: 6 cm  Catheter type: closed end  Catheter size: 20 G  Catheter at skin depth: 9 cm  Assessment  Injection assessment: negative aspiration for heme, no paresthesia on injection and local visualized surrounding nerve on ultrasound  Paresthesia pain: none  Slow fractionated injection: yes  Hemodynamics: stable  Additional Notes  U/S [id number].  (1) Under ultrasound guidance, a 18 gauge needle was inserted and placed in close proximity to the right femoral nerve.  (2) Ultrasound was also used to visualize the spread of the anesthetic in close proximity to the nerve being blocked. (3) The nerve appeared anatomically normal, and (4 there were no apparent abnormal pathological findings on the image that were readily visible and related to the nerve being blocked. (5) A permanent ultrasound image was saved in the patient's record.             Reason for block: post-op pain management and at surgeon's request

## 2019-04-09 NOTE — ANESTHESIA PROCEDURE NOTES
Spinal Block    Patient location during procedure: OR  Start time: 4/9/2019 2:12 PM  End time: 4/9/2019 2:16 PM  Reason for block: primary anesthetic  Staffing  Anesthesiologist: Pierre Agrawal MD  Resident/CRNA: CHRISTINA Galicia - CRNA  Performed: resident/CRNA   Preanesthetic Checklist  Completed: patient identified, site marked, surgical consent, pre-op evaluation, timeout performed, IV checked, risks and benefits discussed, monitors and equipment checked, anesthesia consent given, oxygen available and patient being monitored  Spinal Block  Patient position: sitting  Prep: Betadine  Patient monitoring: continuous pulse ox and frequent blood pressure checks  Approach: midline  Location: L3/L4  Provider prep: mask and sterile gloves  Local infiltration: lidocaine  Agent: bupivacaine  Dose: 1.8  Dose: 1.8  Needle  Needle type: Pencan   Needle gauge: 24 G  Needle length: 4 in  Assessment  Sensory level: T10  Swirl obtained: Yes  CSF: clear  Attempts: 1  Hemodynamics: stable

## 2019-04-09 NOTE — OP NOTE
Operative Note    Kristen Haley  YOB: 1955  [id number]      Pre-operative Diagnosis: Right Knee Osteoarthritis     Post-operative Diagnosis: Same    Procedure: Right Total Knee Arthroplasty     Anesthesia: Nerve Block (femoral) and Spinal    Surgeons: Dr. Angela Howell DO    Assistant(s): Syeda Kidd DO; Torsten Lewis DO    Estimated Blood Loss: 200 cc    IVF: 1500 cc crystalloid    Complications: None    Specimens: Was Not Obtained    Findings: Terminal Right Knee Osteoarthritis     Implants: Kelly Cruz and Kassy Quesadaney II Size 6 Right Femur, Size 3 tibial baseplate, 9 mm Poly       INDICATIONS:    This is a 58 yo female who has been following Dr. Dudley Kaur for Right Knee Pain. Patient attempted and failed non-operative management. After thorough discussion with patient about surgical vs continued non surgical intervention patient has elected to undergo right total knee arthroplasty. Consent obtained and in chart. All questions answered appropriately. Surgical risks including but not limited to: bleeding, blood clots, infection, damage to surrounding tissues/nerves/vessels, failure of fixation, failure of wounds to heal, loss of motion, stiffness, dislocation, postoperative pain, recurrence of symptoms, need for future surgery,  risks of anesthesia, loss of limb and loss of life were all discussed with the patient. Knowing these risks, the patient wishes to proceed with surgery      OPERATIVE SUMMARY:  The patient was met in the preoperative holding area,  where written consent was obtained, the operative site was marked, and all  questions were answered to the patient and family's satisfaction. A  Femoral nerve block was administered by the Anesthesia Team.  She was then  wheeled to the operative suite and laid on the table in supine position after spinal anesthesia administered. All bony prominences were appropriately protected. A tourniquet was placed  high up into the upper operative thigh. Appropriate preoperative antibiotics and  tranexamic acid were administered. A standard preoperative time-out was  then performed with all team members present and in agreement. The  operative site was prepped and draped in a sterile fashion. A standard midline knee incision was then performed with the knee in  flexion using a #10-blade through skin and subcutaneous tissue. Bovie  cautery was used to coagulate any small bleeding vessels. This was then  taken down to and through the Laurie's fascia, anterior to the prepatellar  bursa. Minimal medial and lateral skin flap were sharply undermined. A medial parapatellar arthrotomy was created using bovie cautery, beginning approximately 2-3 fingerbreadths superior to the  superior pole of the patella through the lateral fibers of the quadriceps tendon, around the medial aspect of the  patella, and to the medial aspect of the tibial tubercle. A standard  medial release was performed to the mid coronal plane using the Bovie  cautery and Moon elevator for subperiosteal dissection with diligent protection of the MCL. Infrapatellar fat pad was removed with careful attention on the patellar tendon. Superior fat pad was removed using bovie and small vessels cauterized. The patella was everted in extension and a lateral patellar facetectomy was performed using a sagittal  saw. Patella was then translated laterally as the knee was taken into flexion. At this point, we placed our  navigation pins in the distal femur and mid tibia. This was done using  #15-blade, a tonsil and Schanz pins on power. Our navigation devices were  then placed in accordance with specifications and evaluated to ensure  appropriate positioning. Our handheld was then used to demonstrate  different anatomic landmarks and our markers were placed in their  respective positions in the femur and tibia.   At this point, we proceeded  to map the femur and the tibia after appropriate range of motion and varus  valgus examination. Once adequate mapping was completed knee balancing was assessed both clinically and through navigated indices. Burring of the distal  Femur cut and proximal tibia guide holes was then performed. 4-in-1 cutting block was applied in the appropriate position which was confirmed by navigation. We also evaluated with the  use of an yamilet wing that notching would be avoided. Anterior, posterior, anterior  chamfer, posterior chamfer cuts were made. We then turned our attention to  the tibia where our proximal tibia cut guide was applied, confirmed by navigation and drop silvio, and removed using saggital saw. At this time, a lamina   was placed medially and then laterally with the knee in flexion to  remove any remaining osteophytes, meniscus or residual fat pad tissue. We then began implant trialing, demonstrating excellent  balance in flexion and extension using a size 9 mm poly and therefore we decided to prepare our  Tibia, which was optimally postioned  just external to the medial third of the tibial tubercle, without overhang or undersizing. The box cut was then performed to the femur. Trials  were again placed and demonstrated excellent gap. We then removed all of  the implants,and thoroughly dried the bony surfaces to accept a cement  interdigitation. This was performed after appropriate irrigation. Cement  was then applied to the cancellous surfaces and also to the implants, which  Were then impacted into place using a mallet and impactor. All excess cement  was removed using Lind elevators and tonsils ensuring no remaining cement  in the joint or causing any impingement to the implant. While the cement  cured, we kept the knee in extension with slight pressure and performed a  dilute Betadine lavage for a minimum of 3 minutes. We also ensured no  remaining loose bodies in the joint.   Once the cement was cured, we trialed  a 9 mm poly, which was found to have again excellent balance and therefore we  inserted the final poly at this time. Tracking was found to demonstrate no  patellar tilt or subluxation. At this point, we began our layered closure  beginning first with the arthrotomy using multiple #1 Vicryl followed by a  #1 Stratafix suture. Deep tissue was then closed using 0 Stratafix as  well as 2-0 Vicryl. A 3-0 Monocryl was then used for the skin followed by  Dermabond skin glue. The pin sites were closed using 3-0 Monocryl with  Dermabond skin glue as well. A sterile silver dressing was then applied and the  taken to PACU in stable  condition without complications. Dr. Joy Taveras was present and participated for the entirety of the case.         Electronically signed by Qing Cazares DO on 4/9/2019 at 5:53 PM

## 2019-04-09 NOTE — PROGRESS NOTES
Dr. Severiano Burr completes right femoral nerve block catheter insertion without difficulty. Total of 27 ml .125% Marcaine injected and 1 % lidocaine used as local. Tolerated well with sedation.

## 2019-04-09 NOTE — H&P
History and Physical Update    Pt Name: Agus Perez  MRN: [id number]  YOB: 1955  Date of evaluation: 4/9/2019    [x] I have examined the patient and reviewed the H&P/Consult and there are no changes to the patient or plans. [] I have examined the patient and reviewed the H&P/Consult and have noted the following changes:        Laisha Coburn PAC  electronically signed 4/9/2019 at 12:46 PM           Expand All Collapse All          Show:Clear all  [x]Manual[x]Template[x]Copied    Added by:  HAKAN Dai      []Jeremy for details      History and Physical     Pt Name: Agus Perez  MRN: [id number]  YOB: 1955  Date of evaluation: 4/2/2019  Primary Care Physician: Airam Dillon MD  Patient evaluated at the request of  Dr. Kimberly Urbina  Reason for evaluation:  Right knee DJD  SUBJECTIVE:   History of Chief Complaint:  According to chart note in 2018        Kathia Haley is a 59 y. o. female, who is being seen at the request of Airam Dillon MD for consultation and evaluation of bilateral knee pain that has been present for approximately 6 months. The patient states that the right is worse than the left.  The patient does not recall a specific injury.  The pain improves with resting and icing.  The pain worsens with  stair climbing and arising from a seated position as well as when she is hiking with her . The patient has pain with walking down hill when hiking and she does see some instability with the right knee.  The patient has not had a previous corticosteroid injection.  The patient has tried home exercise program as well as activity modification.  The patient has tried oral NSAIDs for this problem previously but has not seen any relief with them.  The patient has recently decided to go to the gym to work on strengthening of the knees.                           Past Medical History       has a past medical history of Arthritis, Gestational diabetes, Hyperlipidemia, Type 2 diabetes mellitus ([address] and Wears glasses.     Past Surgical History   has no past surgical history on file.         Medications   Scheduled Meds:  Current Outpatient Rx          Current Outpatient Rx   Medication Sig Dispense Refill    acetaminophen (TYLENOL) 500 MG tablet Take 1,000 mg by mouth daily as needed for Pain        SITagliptin (JANUVIA) 100 MG tablet Take 1 tablet by mouth daily 42 tablet 0    metFORMIN (GLUCOPHAGE XR) 500 MG extended release tablet Take two tabs in the morning and one tab in the evening. (Patient taking differently: Take 500 mg by mouth nightly NIGHTLY WITH FOOD) 90 tablet 5    metFORMIN (GLUCOPHAGE-XR) 500 MG extended release tablet Take 2 tablets by mouth daily (with breakfast) 60 tablet 5    atorvastatin (LIPITOR) 20 MG tablet take 1 tablet by mouth daily 30 tablet 11         Continuous Infusions:  PRN Meds:.   Allergies  has No Known Allergies.     Family History     family history includes Cancer in her father, maternal uncle, mother, paternal grandfather, and paternal uncle; Diabetes in her father; Heart Disease in her maternal uncle and mother; High Blood Pressure in her mother.           Family Status   Relation Name Status    Mother       Father       MUnc   (Not Specified)    PUnc   (Not Specified)    PGF   (Not Specified)            Social History  Social History               Socioeconomic History    Marital status: Single       Spouse name: Not on file    Number of children: Not on file    Years of education: Not on file    Highest education level: Not on file   Occupational History    Not on file   Social Needs    Financial resource strain: Not on file    Food insecurity:       Worry: Not on file       Inability: Not on file    Transportation needs:       Medical: Not on file       Non-medical: Not on file   Tobacco Use    Smoking status: Never Smoker    Smokeless tobacco: Never Used   Substance and Sexual Activity    Alcohol use: Yes       Alcohol/week: 1.2 oz       Types: 2 Glasses of wine per week       Comment: ONCE A WEEK    Drug use: No    Sexual activity: Not on file   Lifestyle    Physical activity:       Days per week: Not on file       Minutes per session: Not on file    Stress: Not on file   Relationships    Social connections:       Talks on phone: Not on file       Gets together: Not on file       Attends Denominational service: Not on file       Active member of club or organization: Not on file       Attends meetings of clubs or organizations: Not on file       Relationship status: Not on file    Intimate partner violence:       Fear of current or ex partner: Not on file       Emotionally abused: Not on file       Physically abused: Not on file       Forced sexual activity: Not on file   Other Topics Concern    Not on file   Social History Narrative    Not on file                     Hobbies:  RV , hiking      OBJECTIVE:   VITALS:  height is 5' 5\" (1.651 m) and weight is 175 lb (79.4 kg). Her oral temperature is 97.7 °F (36.5 °C). Her blood pressure is 121/73 and her pulse is 68. Her respiration is 18 and oxygen saturation is 98%.      CONSTITUTIONAL: Alert and oriented times 3, no acute distress and cooperative to examination. friendly and pleasant      SKIN: rash No     HEENT: Head is normocephalic, atraumatic. EOMI, PERRLA     Oral air way :slightly narrow Yes     NECK: neck supple, no lymphadenopathy noted, trachea midline and straight       2+ carotid, no bruit     LUNGS: Chest expands equally bilaterally upon respiration, no accessory muscle used.  Ausculation reveals no adventitious breath sounds.     CARDIOVASCULAR: \"Heart sounds are normal.  Regular rate and rhythm without murmur,     ABDOMEN: Bowel sounds are present in all four quadrants       GENATALIA:Deferred.     NEUROLOGIC: \"CN II-XII are grossly intact.         EXTREMITIES: Pitting edema:  No,  Varicose veins: No      Dorsal pedal/posterior tibial pulses palpable: Yes            Strength:  Normal             Patient Active Problem List   Diagnosis    Gestational diabetes    Dyslipidemia    Type 2 diabetes mellitus without complication, without long-term current use of insulin ([facility])                   IMPRESSIONS: 1.      Right knee DJD   2. does not have any pertinent problems on file.     Filippo Chau PAC  Electronically signed 4/2/2019 at 2:31 PM        Scheduled for:  Right navio total knee                       Cosigned by: Marguerite Wilder DO at 4/3/2019  7:53 AM

## 2019-04-09 NOTE — ANESTHESIA PRE PROCEDURE
Allergies    Problem List:    Patient Active Problem List   Diagnosis Code    Gestational diabetes O23.80    Dyslipidemia E78.5    Type 2 diabetes mellitus without complication, without long-term current use of insulin ([facility] E11.9       Past Medical History:        Diagnosis Date    Arthritis     BILATERAL KNEES    Gestational diabetes     FIRST PREGNANCY ONLY     Hyperlipidemia     Type 2 diabetes mellitus ([facility] 06/2018    Wears glasses        Past Surgical History:  History reviewed. No pertinent surgical history. Social History:    Social History     Tobacco Use    Smoking status: Never Smoker    Smokeless tobacco: Never Used   Substance Use Topics    Alcohol use: Yes     Alcohol/week: 1.2 oz     Types: 2 Glasses of wine per week     Comment: ONCE A WEEK                                Counseling given: Not Answered      Vital Signs (Current):   Vitals:    04/09/19 1159   BP: 139/81   Pulse: 78   Resp: 24   Temp: 97.2 °F (36.2 °C)   TempSrc: Temporal   SpO2: 98%   Weight: 175 lb (79.4 kg)   Height: 5' 5\" (1.651 m)                                              BP Readings from Last 3 Encounters:   04/09/19 139/81   04/02/19 121/73   01/11/19 132/70       NPO Status: Time of last liquid consumption: 2230                        Time of last solid consumption: 2000                        Date of last liquid consumption: 04/08/19                        Date of last solid food consumption: 04/08/19    BMI:   Wt Readings from Last 3 Encounters:   04/09/19 175 lb (79.4 kg)   04/02/19 175 lb (79.4 kg)   01/11/19 174 lb (78.9 kg)     Body mass index is 29.12 kg/m².     CBC:   Lab Results   Component Value Date    WBC 8.4 04/02/2019    RBC 5.14 04/02/2019    HGB 14.7 04/02/2019    HCT 45.7 04/02/2019    MCV 88.9 04/02/2019    RDW 12.9 04/02/2019     04/02/2019       CMP:   Lab Results   Component Value Date     04/02/2019    K 4.1 04/02/2019     04/02/2019    CO2 20 04/02/2019    BUN 12 04/02/2019    CREATININE 0.63 04/02/2019    GFRAA >60 04/02/2019    LABGLOM >60 04/02/2019    GLUCOSE 98 04/02/2019    PROT 7.9 04/02/2019    CALCIUM 9.7 04/02/2019    BILITOT 0.36 04/02/2019    ALKPHOS 82 04/02/2019    AST 21 04/02/2019    ALT 38 04/02/2019       POC Tests:   Recent Labs     04/09/19  1220   POCGLU 113*       Coags: No results found for: PROTIME, INR, APTT    HCG (If Applicable): No results found for: PREGTESTUR, PREGSERUM, HCG, HCGQUANT     ABGs: No results found for: PHART, PO2ART, RBD0BLZ, SSF8QVT, BEART, [id number]     Type & Screen (If Applicable):  No results found for: LABABO, [address]    Anesthesia Evaluation  Patient summary reviewed and Nursing notes reviewed no history of anesthetic complications:   Airway: Mallampati: I  TM distance: >3 FB   Neck ROM: full  Mouth opening: > = 3 FB Dental: normal exam         Pulmonary:Negative Pulmonary ROS breath sounds clear to auscultation                             Cardiovascular:  Exercise tolerance: good (>4 METS),       (-) valvular problems/murmurs, past MI and CAD      Rhythm: regular  Rate: normal                    Neuro/Psych:   Negative Neuro/Psych ROS              GI/Hepatic/Renal: Neg GI/Hepatic/Renal ROS            Endo/Other: Negative Endo/Other ROS   (+) DiabetesType II DM, well controlled, , .                 Abdominal:       Abdomen: soft. Vascular: negative vascular ROS. Anesthesia Plan      MAC, regional, spinal and general     ASA 2     (MAC + SPINAL FOR case  Femoral catheter for post op knee pain    GETA back up)  Induction: intravenous. MIPS: Postoperative opioids intended and Prophylactic antiemetics administered. Anesthetic plan and risks discussed with patient. Plan discussed with CRNA.     Attending anesthesiologist reviewed and agrees with 2300 Eileen Moreno MD   4/9/2019

## 2019-04-10 VITALS
WEIGHT: 175 LBS | DIASTOLIC BLOOD PRESSURE: 54 MMHG | OXYGEN SATURATION: 97 % | BODY MASS INDEX: 29.16 KG/M2 | TEMPERATURE: 97.6 F | RESPIRATION RATE: 18 BRPM | SYSTOLIC BLOOD PRESSURE: 98 MMHG | HEART RATE: 69 BPM | HEIGHT: 65 IN

## 2019-04-10 PROBLEM — D72.829 LEUKOCYTOSIS: Status: ACTIVE | Noted: 2019-04-10

## 2019-04-10 LAB
ABSOLUTE EOS #: <0.03 K/UL (ref 0–0.44)
ABSOLUTE IMMATURE GRANULOCYTE: 0.05 K/UL (ref 0–0.3)
ABSOLUTE LYMPH #: 1.26 K/UL (ref 1.1–3.7)
ABSOLUTE MONO #: 0.89 K/UL (ref 0.1–1.2)
ALBUMIN SERPL-MCNC: 3.4 G/DL (ref 3.5–5.2)
ALBUMIN/GLOBULIN RATIO: 1.4 (ref 1–2.5)
ALP BLD-CCNC: 63 U/L (ref 35–104)
ALT SERPL-CCNC: 52 U/L (ref 5–33)
ANION GAP SERPL CALCULATED.3IONS-SCNC: 9 MMOL/L (ref 9–17)
AST SERPL-CCNC: 34 U/L
BASOPHILS # BLD: 0 % (ref 0–2)
BASOPHILS ABSOLUTE: <0.03 K/UL (ref 0–0.2)
BILIRUB SERPL-MCNC: 0.26 MG/DL (ref 0.3–1.2)
BUN BLDV-MCNC: 17 MG/DL (ref 8–23)
BUN/CREAT BLD: ABNORMAL (ref 9–20)
CALCIUM SERPL-MCNC: 8.6 MG/DL (ref 8.6–10.4)
CHLORIDE BLD-SCNC: 108 MMOL/L (ref 98–107)
CO2: 21 MMOL/L (ref 20–31)
CREAT SERPL-MCNC: 0.66 MG/DL (ref 0.5–0.9)
DIFFERENTIAL TYPE: ABNORMAL
EOSINOPHILS RELATIVE PERCENT: 0 % (ref 1–4)
GFR AFRICAN AMERICAN: >60 ML/MIN
GFR NON-AFRICAN AMERICAN: >60 ML/MIN
GFR SERPL CREATININE-BSD FRML MDRD: ABNORMAL ML/MIN/{1.73_M2}
GFR SERPL CREATININE-BSD FRML MDRD: ABNORMAL ML/MIN/{1.73_M2}
GLUCOSE BLD-MCNC: 159 MG/DL (ref 70–99)
HCT VFR BLD CALC: 35.5 % (ref 36.3–47.1)
HEMOGLOBIN: 11.1 G/DL (ref 11.9–15.1)
IMMATURE GRANULOCYTES: 0 %
LYMPHOCYTES # BLD: 9 % (ref 24–43)
MCH RBC QN AUTO: 28.2 PG (ref 25.2–33.5)
MCHC RBC AUTO-ENTMCNC: 31.3 G/DL (ref 28.4–34.8)
MCV RBC AUTO: 90.3 FL (ref 82.6–102.9)
MONOCYTES # BLD: 6 % (ref 3–12)
NRBC AUTOMATED: 0 PER 100 WBC
PDW BLD-RTO: 12.8 % (ref 11.8–14.4)
PLATELET # BLD: 289 K/UL (ref 138–453)
PLATELET ESTIMATE: ABNORMAL
PMV BLD AUTO: 12.1 FL (ref 8.1–13.5)
POTASSIUM SERPL-SCNC: 4.2 MMOL/L (ref 3.7–5.3)
RBC # BLD: 3.93 M/UL (ref 3.95–5.11)
RBC # BLD: ABNORMAL 10*6/UL
SEG NEUTROPHILS: 85 % (ref 36–65)
SEGMENTED NEUTROPHILS ABSOLUTE COUNT: 12.53 K/UL (ref 1.5–8.1)
SODIUM BLD-SCNC: 138 MMOL/L (ref 135–144)
TOTAL PROTEIN: 5.9 G/DL (ref 6.4–8.3)
WBC # BLD: 14.8 K/UL (ref 3.5–11.3)
WBC # BLD: ABNORMAL 10*3/UL

## 2019-04-10 PROCEDURE — 85025 COMPLETE CBC W/AUTO DIFF WBC: CPT

## 2019-04-10 PROCEDURE — 97166 OT EVAL MOD COMPLEX 45 MIN: CPT

## 2019-04-10 PROCEDURE — 99253 IP/OBS CNSLTJ NEW/EST LOW 45: CPT | Performed by: INTERNAL MEDICINE

## 2019-04-10 PROCEDURE — 97110 THERAPEUTIC EXERCISES: CPT

## 2019-04-10 PROCEDURE — 6370000000 HC RX 637 (ALT 250 FOR IP): Performed by: STUDENT IN AN ORGANIZED HEALTH CARE EDUCATION/TRAINING PROGRAM

## 2019-04-10 PROCEDURE — 97162 PT EVAL MOD COMPLEX 30 MIN: CPT

## 2019-04-10 PROCEDURE — 97530 THERAPEUTIC ACTIVITIES: CPT

## 2019-04-10 PROCEDURE — 94760 N-INVAS EAR/PLS OXIMETRY 1: CPT

## 2019-04-10 PROCEDURE — 2580000003 HC RX 258: Performed by: STUDENT IN AN ORGANIZED HEALTH CARE EDUCATION/TRAINING PROGRAM

## 2019-04-10 PROCEDURE — 80053 COMPREHEN METABOLIC PANEL: CPT

## 2019-04-10 PROCEDURE — 36415 COLL VENOUS BLD VENIPUNCTURE: CPT

## 2019-04-10 PROCEDURE — 6360000002 HC RX W HCPCS: Performed by: STUDENT IN AN ORGANIZED HEALTH CARE EDUCATION/TRAINING PROGRAM

## 2019-04-10 PROCEDURE — 97535 SELF CARE MNGMENT TRAINING: CPT

## 2019-04-10 RX ORDER — OXYCODONE HYDROCHLORIDE AND ACETAMINOPHEN 5; 325 MG/1; MG/1
1-2 TABLET ORAL EVERY 6 HOURS PRN
Qty: 56 TABLET | Refills: 0 | Status: SHIPPED | OUTPATIENT
Start: 2019-04-10 | End: 2019-04-17

## 2019-04-10 RX ORDER — ASPIRIN 81 MG/1
81 TABLET ORAL 2 TIMES DAILY
Qty: 84 TABLET | Refills: 0 | Status: SHIPPED | OUTPATIENT
Start: 2019-04-10 | End: 2021-03-15

## 2019-04-10 RX ORDER — DOCUSATE SODIUM 100 MG/1
100 CAPSULE, LIQUID FILLED ORAL 2 TIMES DAILY PRN
Qty: 60 CAPSULE | Refills: 0 | Status: SHIPPED | OUTPATIENT
Start: 2019-04-10 | End: 2021-03-15

## 2019-04-10 RX ADMIN — FAMOTIDINE 20 MG: 20 TABLET, FILM COATED ORAL at 08:09

## 2019-04-10 RX ADMIN — ACETAMINOPHEN 1000 MG: 500 TABLET ORAL at 10:22

## 2019-04-10 RX ADMIN — TRAMADOL HYDROCHLORIDE 50 MG: 50 TABLET, FILM COATED ORAL at 14:14

## 2019-04-10 RX ADMIN — Medication 2 G: at 06:35

## 2019-04-10 RX ADMIN — TRAMADOL HYDROCHLORIDE 50 MG: 50 TABLET, FILM COATED ORAL at 06:35

## 2019-04-10 RX ADMIN — SODIUM CHLORIDE: 9 INJECTION, SOLUTION INTRAVENOUS at 03:23

## 2019-04-10 RX ADMIN — ACETAMINOPHEN 1000 MG: 500 TABLET ORAL at 03:18

## 2019-04-10 RX ADMIN — ASPIRIN 81 MG: 81 TABLET ORAL at 08:09

## 2019-04-10 RX ADMIN — METFORMIN HYDROCHLORIDE 1000 MG: 500 TABLET, EXTENDED RELEASE ORAL at 08:09

## 2019-04-10 RX ADMIN — KETOROLAC TROMETHAMINE 30 MG: 30 INJECTION, SOLUTION INTRAMUSCULAR; INTRAVENOUS at 10:22

## 2019-04-10 RX ADMIN — SENNOSIDES AND DOCUSATE SODIUM 1 TABLET: 8.6; 5 TABLET ORAL at 08:09

## 2019-04-10 RX ADMIN — DOCUSATE SODIUM 100 MG: 100 CAPSULE, LIQUID FILLED ORAL at 08:10

## 2019-04-10 RX ADMIN — KETOROLAC TROMETHAMINE 30 MG: 30 INJECTION, SOLUTION INTRAMUSCULAR; INTRAVENOUS at 03:18

## 2019-04-10 ASSESSMENT — PAIN DESCRIPTION - PAIN TYPE
TYPE: SURGICAL PAIN
TYPE: ACUTE PAIN;SURGICAL PAIN

## 2019-04-10 ASSESSMENT — PAIN DESCRIPTION - PROGRESSION
CLINICAL_PROGRESSION: NOT CHANGED
CLINICAL_PROGRESSION: GRADUALLY IMPROVING
CLINICAL_PROGRESSION: NOT CHANGED

## 2019-04-10 ASSESSMENT — PAIN SCALES - GENERAL
PAINLEVEL_OUTOF10: 5
PAINLEVEL_OUTOF10: 1
PAINLEVEL_OUTOF10: 2
PAINLEVEL_OUTOF10: 3
PAINLEVEL_OUTOF10: 1
PAINLEVEL_OUTOF10: 3
PAINLEVEL_OUTOF10: 2
PAINLEVEL_OUTOF10: 1
PAINLEVEL_OUTOF10: 0

## 2019-04-10 ASSESSMENT — PAIN DESCRIPTION - ORIENTATION
ORIENTATION: RIGHT

## 2019-04-10 ASSESSMENT — PAIN DESCRIPTION - LOCATION
LOCATION: KNEE

## 2019-04-10 ASSESSMENT — PAIN DESCRIPTION - DESCRIPTORS
DESCRIPTORS: DISCOMFORT
DESCRIPTORS: SORE;TENDER;ACHING

## 2019-04-10 ASSESSMENT — PAIN DESCRIPTION - FREQUENCY
FREQUENCY: CONTINUOUS
FREQUENCY: CONTINUOUS

## 2019-04-10 ASSESSMENT — PAIN DESCRIPTION - ONSET: ONSET: ON-GOING

## 2019-04-10 NOTE — PLAN OF CARE
Problem: Pain:  Goal: Pain level will decrease  Description  Pain level will decrease  Outcome: Ongoing  Goal: Control of acute pain  Description  Control of acute pain  Outcome: Ongoing     Problem: Discharge Planning:  Goal: Discharged to appropriate level of care  Description  Discharged to appropriate level of care  Outcome: Ongoing     Problem: Mobility - Impaired:  Goal: Mobility will improve  Description  Mobility will improve  Outcome: Ongoing     Problem: Infection - Surgical Site:  Goal: Will show no infection signs and symptoms  Description  Will show no infection signs and symptoms  Outcome: Ongoing     Problem: Pain - Acute:  Goal: Pain level will decrease  Description  Pain level will decrease  Outcome: Ongoing     Problem: Falls - Risk of:  Goal: Will remain free from falls  Description  Will remain free from falls  Outcome: Ongoing  Goal: Absence of physical injury  Description  Absence of physical injury  Outcome: Ongoing

## 2019-04-10 NOTE — PROGRESS NOTES
Occupational Therapy   Occupational Therapy Initial Assessment  Date: 4/10/2019   Patient Name: Kip Collins  MRN: [id number]     : 1955    Date of Service: 4/10/2019    Discharge Recommendations: Further therapy recommended at discharge. Equipment recommendations listed below are based on what the patient would need if they were able to return to prior living arrangements at the time of discharge. OT Equipment Recommendations  Equipment Needed: Yes  Mobility Devices: Lorrane Fruit; ADL Assistive Devices  Walker: Rolling  ADL Assistive Devices: Long-handled Sponge;Sock-Aid Hard;Long-handled Shoe Horn;Reacher;Transfer Tub Bench    Assessment   Performance deficits / Impairments: Decreased functional mobility ; Decreased high-level IADLs;Decreased ADL status; Decreased endurance;Decreased balance  Prognosis: Good  Decision Making: Medium Complexity  Patient Education: Safety awareness, OTPOC, TKA precautions, transfer tech's, sx soap-good return  REQUIRES OT FOLLOW UP: Yes  Activity Tolerance  Activity Tolerance: Patient Tolerated treatment well;Patient limited by pain  Safety Devices  Safety Devices in place: Yes  Type of devices: All fall risk precautions in place;Nurse notified;Gait belt; Chair alarm in place; Left in chair;Call light within reach  Restraints  Initially in place: No         Patient Diagnosis(es): The primary encounter diagnosis was Post-op pain. A diagnosis of Total knee replacement status, right was also pertinent to this visit. has a past medical history of Arthritis, Gestational diabetes, Hyperlipidemia, Type 2 diabetes mellitus ([address] and Wears glasses. has a past surgical history that includes Total knee arthroplasty (Right, 2019) and REPLACEMENT TOTAL KNEE (Right, 2019).        Restrictions  Restrictions/Precautions  Restrictions/Precautions: Weight Bearing, General Precautions, Fall Risk  Lower Extremity Weight Bearing Restrictions  Right Lower Extremity Weight Bearing: Weight Bearing As Tolerated    Subjective   General  Patient assessed for rehabilitation services?: Yes  Family / Caregiver Present: No  Diagnosis: R TKA  Pain Assessment  Pain Assessment: 0-10  Pain Level: 1  Pain Type: Acute pain;Surgical pain  Pain Location: Knee  Pain Orientation: Right  Pain Descriptors: Sore;Tender;Aching  Pain Frequency: Continuous  Clinical Progression: Not changed  Non-Pharmaceutical Pain Intervention(s): Distraction; Ambulation/Increased Activity; Therapeutic presence; Emotional support  Oxygen Therapy  SpO2: 97 %  Pulse Oximeter Device Mode: Intermittent  O2 Device: None (Room air)  Social/Functional History  Social/Functional History  Lives With: Spouse  Type of Home: House  Home Layout: Two level, Bed/Bath upstairs, 1/2 bath on main level  Home Access: Stairs to enter with rails  Entrance Stairs - Number of Steps: 4  Entrance Stairs - Rails: Left  Bathroom Shower/Tub: Tub/Shower unit  Bathroom Toilet: Standard  Home Equipment: Rolling walker, Cane  Receives Help From: Family(Supportive spouse)  ADL Assistance: Independent  Homemaking Assistance: Independent  Homemaking Responsibilities: Yes  Ambulation Assistance: Independent  Transfer Assistance: Independent  Active : Yes  Mode of Transportation: [place]  Occupation: Retired  Type of occupation: Retired nurse  Leisure & Hobbies: Sewing, hiking, camping, biking  Additional Comments: Spouse able to assist per pt     Objective   Vision: Impaired  Vision Exceptions: Wears glasses at all times  Hearing: Within functional limits    Orientation  Overall Orientation Status: Within Functional Limits     Balance  Sitting Balance: Modified independent   Standing Balance: Stand by assistance  Standing Balance  Time: 5 min  Activity: Pt stood bedside, func mob around room  Sit to stand: Contact guard assistance  Stand to sit: Stand by assistance  Functional Mobility  Functional - Mobility Device: Rolling Walker  Activity: Other  Assist Level: Contact guard assistance  Functional Mobility Comments: Pt demo'd step-to method, no major LOB noted  ADL  Feeding: Modified independent ;Setup  Grooming: Modified independent ;Setup  UE Bathing: Modified independent ; Increased time to complete(Writer only assisted with back washing)  LE Bathing: Minimal assistance  UE Dressing: Stand by assistance  LE Dressing: Minimal assistance  Toileting: Contact guard assistance  Additional Comments: Pt performed full ADL bathing activity seated using sx soap  Tone RUE  RUE Tone: Normotonic  Tone LUE  LUE Tone: Normotonic  Coordination  Movements Are Fluid And Coordinated: Yes(UE's)     Bed mobility  Supine to Sit: Minimal assistance  Sit to Supine: Unable to assess  Scooting: Stand by assistance  Comment: Required RLE management this date  Transfers  Sit to stand: Contact guard assistance  Stand to sit: Stand by assistance     Cognition  Overall Cognitive Status: WFL        Sensation  Overall Sensation Status: WNL      LUE AROM (degrees)  LUE AROM : WFL  RUE AROM (degrees)  RUE AROM : WFL  LUE Strength  Gross LUE Strength: WFL  L Hand Grasp: 5/5  L Hand Release: 5/5  RUE Strength  Gross RUE Strength: WFL  R Hand Grasp: 5/5  R Hand Release: 5/5   Plan   Plan  Times per week: 6-7x  Current Treatment Recommendations: Balance Training, Functional Mobility Training, Equipment Evaluation, Education, & procurement, Patient/Caregiver Education & Training, Home Management Training, Self-Care / ADL, Endurance Training, Safety Education & Training, Pain Management     -[facility] Inpatient Daily Activity Raw Score: 19  AM-PAC Inpatient ADL T-Scale Score : 40.22  ADL Inpatient CMS 0-100% Score: 42.8  ADL Inpatient CMS G-Code Modifier : CK    Goals  Short term goals  Time Frame for Short term goals: Pt will by discharge   Short term goal 1: demo ADL LB dressing/bathing activity seated with AD PRN and SBA  Short term goal 2: demo good safety awareness during func mob around room with RW and mod I  Short term goal 3: demo standing during func activity for 13 min with RW and supervision  Short term goal 4: demo mod I for all bed mob/transfers using LRD  Short term goal 5: identify all TKA precatuions with 1 vc     Therapy Time   Individual Concurrent Group Co-treatment   Time In 0900         Time Out 0932         Minutes [address] [place] [address] , OTR/L

## 2019-04-10 NOTE — PROGRESS NOTES
Physical Therapy  Facility/Department: [address] ORTHO/MED SURG  Daily Treatment Note  NAME: Sourav Haley  : 1955  MRN: [id number]    Date of Service: 4/10/2019    Discharge Recommendations:    Further therapy recommended at discharge. Patient Diagnosis(es): The primary encounter diagnosis was Post-op pain. A diagnosis of Total knee replacement status, right was also pertinent to this visit. has a past medical history of Arthritis, Gestational diabetes, Hyperlipidemia, Type 2 diabetes mellitus ([facility] [address] and Wears glasses. has a past surgical history that includes Total knee arthroplasty (Right, 2019) and REPLACEMENT TOTAL KNEE (Right, 2019). Restrictions  Restrictions/Precautions  Restrictions/Precautions: Weight Bearing, General Precautions, Fall Risk  Lower Extremity Weight Bearing Restrictions  Right Lower Extremity Weight Bearing: Weight Bearing As Tolerated  Subjective   General  Response To Previous Treatment: Patient with no complaints from previous session.   Family / Caregiver Present: No  Pain Screening  Patient Currently in Pain: Yes  Pain Assessment  Pain Assessment: 0-10  Pain Level: 3  Pain Location: Knee  Pain Orientation: Right  Clinical Progression: Gradually improving  Vital Signs  Patient Currently in Pain: Yes       Orientation  Orientation  Overall Orientation Status: Within Normal Limits  Cognition      Objective   Bed mobility  Rolling to Left: Contact guard assistance  Rolling to Right: Contact guard assistance  Supine to Sit: Contact guard assistance  Sit to Supine: Contact guard assistance  Scooting: Contact guard assistance  Transfers  Sit to Stand: Contact guard assistance  Stand to sit: Contact guard assistance  Ambulation  Ambulation?: Yes  Ambulation 1  Surface: level tile  Device: Rolling Walker  Assistance: Contact guard assistance  Distance: amb 200 ft with a RW x CGA with WBAT R LE  Stairs/Curb  Stairs?: Yes  Stairs  # Steps : 4  Stairs Height: 6\"  Rails: Right ascending  Device: No Device  Assistance: Contact guard assistance     Balance  Posture: Good  Sitting - Static: Good  Sitting - Dynamic: Fair;+  Standing - Static: Fair  Standing - Dynamic: Fair  Exercises  Hamstring Sets: x 10 R LE  Quad Sets: x 10 R LE  Heelslides: x 10 R LE  Knee Long Arc Quad: x 10 R LE  Ankle Pumps: x 10 R LE   AROM RLE (degrees)  RLE AROM: (0-90 degrees knee flx)  AROM LLE (degrees)  LLE AROM : WNL  AROM RUE (degrees)  RUE AROM : WNL  AROM LUE (degrees)  LUE AROM : WNL  Strength RLE  Strength RLE: (N/T)  Strength LLE  Strength LLE: WNL  Strength RUE  Strength RUE: WNL  Strength LUE  Strength LUE: WNL      Assessment   Body structures, Functions, Activity limitations: Decreased functional mobility ; Decreased endurance;Decreased strength  Assessment: Good effort with ambulation maintaining fairly good control of R LE . ROM R knee 0-112 degrees.  Only a minimum of help wit exercises  Prognosis: Good  Decision Making: Medium Complexity  Patient Education: PT  POC, Goals  REQUIRES PT FOLLOW UP: Yes  Activity Tolerance  Activity Tolerance: Patient limited by fatigue     G-Code     OutComes Score               -PAC Score  -PAC Inpatient Mobility Raw Score : 19  AM-PAC Inpatient T-Scale Score : 45.44  Mobility Inpatient CMS 0-100% Score: 41.77  Mobility Inpatient CMS G-Code Modifier : CK        Goals  Short term goals  Time Frame for Short term goals: 10 visits  Short term goal 1: transfers with SBA  Short term goal 2: amb 200 ft with a RW x SBA with WBAT  R LE  Short term goal 3: ascend/descend 4 steps with SBA  Short term goal 4: total knee exercises x SBA  Patient Goals   Patient goals : Return home    Plan    Plan  Times per week: BID  Current Treatment Recommendations: Strengthening, Transfer Training, Endurance Training, Gait Training, Functional Mobility Training, ROM, Stair training, Safety Education & Training  Safety Devices  Type of devices: Call light within reach, Chair alarm in place, Patient at risk for falls, Nurse notified, Left in bed     Therapy Time   Individual Concurrent Group Co-treatment   Time In 1300         Time Out 1325         Minutes [address] [facility], PT

## 2019-04-10 NOTE — PROGRESS NOTES
CLINICAL PHARMACY NOTE: MEDS TO [address] Select Patient?: No  Total # of Prescriptions Filled: 3   The following medications were delivered to the patient:  ·  MG  · ASPIRIN LOW DOSE 81 MG  · OXYCODONE-ACETAMINOPHEN 5/325 MG  Total # of Interventions Completed: 1  Time Spent (min): 60    Additional Documentation:MEDICATIONS DELIVERED TO THE ROOM.

## 2019-04-10 NOTE — PROGRESS NOTES
Physical Therapy    Facility/Department: [address] ORTHO/MED SURG  Initial Assessment    NAME: Andrew Haley  : 1955  MRN: [id number]    Date of Service: 4/10/2019    Discharge Recommendations:    Further therapy recommended at discharge. Assessment   Body structures, Functions, Activity limitations: Decreased functional mobility ; Decreased endurance;Decreased strength  Assessment: Good effort with ambulation maintaining fairly good control of R LE . ROM R knee 0-90 degrees. Only a minimum of help wit exercises  Prognosis: Good  Decision Making: Medium Complexity  Patient Education: PT  POC, Goals  REQUIRES PT FOLLOW UP: Yes  Activity Tolerance  Activity Tolerance: Patient limited by fatigue       Patient Diagnosis(es): The primary encounter diagnosis was Post-op pain. A diagnosis of Total knee replacement status, right was also pertinent to this visit. has a past medical history of Arthritis, Gestational diabetes, Hyperlipidemia, Type 2 diabetes mellitus ([facility] [address] and Wears glasses. has a past surgical history that includes Total knee arthroplasty (Right, 2019).     Restrictions  Restrictions/Precautions  Restrictions/Precautions: Weight Bearing  Lower Extremity Weight Bearing Restrictions  Right Lower Extremity Weight Bearing: Weight Bearing As Tolerated  Vision/Hearing  Vision: Impaired  Vision Exceptions: Wears glasses at all times  Hearing: Within functional limits     Subjective  General  Patient assessed for rehabilitation services?: Yes  Response To Previous Treatment: Not applicable  Family / Caregiver Present: No  Follows Commands: Within Functional Limits  Pain Screening  Patient Currently in Pain: Yes  Pain Assessment  Pain Assessment: 0-10  Pain Level: 0  Pain Location: Knee  Pain Orientation: Right  Clinical Progression: Not changed  Vital Signs  Patient Currently in Pain: Yes       Orientation  Orientation  Overall Orientation Status: Within Normal Limits  Social/Functional History  Social/Functional History  Lives With: Spouse  Type of Home: House  Home Layout: Two level  Home Access: Stairs to enter with rails  Entrance Stairs - Number of Steps: 4  Entrance Stairs - Rails: Left  Bathroom Shower/Tub: Tub/Shower unit  Bathroom Toilet: Standard  Home Equipment: Rolling walker, Cane  Receives Help From: Family(Supportive )  ADL Assistance: Independent  Homemaking Assistance: Independent  Homemaking Responsibilities: Yes  Ambulation Assistance: Independent  Transfer Assistance: Independent  Active : Yes  Mode of Transportation: [place]  Occupation: Retired  Type of occupation: Retired nurse  Leisure & Hobbies: Sewing, hiking, camping, biking  Cognition      Objective     AROM RLE (degrees)  RLE AROM: (0-90 degrees knee flx)  AROM LLE (degrees)  LLE AROM : WNL  AROM RUE (degrees)  RUE AROM : WNL  AROM LUE (degrees)  LUE AROM : WNL  Strength RLE  Strength RLE: (N/T)  Strength LLE  Strength LLE: WNL  Strength RUE  Strength RUE: WNL  Strength LUE  Strength LUE: WNL     Sensation  Overall Sensation Status: WFL  Bed mobility  Rolling to Left: Minimal assistance  Rolling to Right: Minimal assistance  Supine to Sit: Minimal assistance  Sit to Supine: Minimal assistance  Scooting: Minimal assistance  Transfers  Sit to Stand: Minimal Assistance  Stand to sit: Minimal Assistance  Ambulation  Ambulation?: Yes  Ambulation 1  Surface: level tile  Device: Rolling Walker  Assistance: Minimal assistance  Distance: amb 50 ft with a RW x min assist with WBAT R LE     Balance  Posture: Good  Sitting - Static: Good  Sitting - Dynamic: Fair  Standing - Static: Fair  Standing - Dynamic: Fair  Exercises  Hamstring Sets: x 10 R LE  Quad Sets: x 10 R LE  Heelslides: x 10 R LE  Knee Long Arc Quad: x 10 R LE  Ankle Pumps: x 10 R LE     Plan   Plan  Times per week: BID  Current Treatment Recommendations: Strengthening, Transfer Training, Endurance Training, Gait Training, Functional Mobility Training, ROM, Stair training, Safety Education & Training  Safety Devices  Type of devices: Call light within reach, Chair alarm in place, Patient at risk for falls, Left in chair, Nurse notified    G-Code    OutComes Score       AM-PAC Score  AM-PAC Inpatient Mobility Raw Score : 19  AM-PAC Inpatient T-Scale Score : 45.44  Mobility Inpatient CMS 0-100% Score: 41.77  Mobility Inpatient CMS G-Code Modifier : CK       Goals  Short term goals  Time Frame for Short term goals: 10 visits  Short term goal 1: transfers with SBA  Short term goal 2: amb 200 ft with a RW x SBA with WBAT  R LE  Short term goal 3: ascend/descend 4 steps with SBA  Short term goal 4: total knee exercises x SBA  Patient Goals   Patient goals : Return home       Therapy Time   Individual Concurrent Group Co-treatment   Time In 7838         Time Out 0915         Minutes 22             1 of [facility], PT

## 2019-04-10 NOTE — CONSULTS
[address] / HISTORY AND PHYSICAL EXAMINATION            Date:   4/10/2019  Patient name:  Kylie Haley  Date of admission:  4/9/2019 10:39 AM  MRN:   [id number]  Account:  [de-identified]  YOB: 1955  PCP:    Ariel Kitchen MD  Room:   3346/4774-30  Code Status:    Full Code    Physician Requesting Consult: Otis Parker DO    Reason for Consult:  Post op medical management    Chief Complaint:     Knee pain     History Obtained From:     patient, electronic medical record    History of Present Illness:     59 F with PMH significant for DM, OA who presented for elective surgery. States she has been having knee pain going on for the past 6 months. Denies any injury to her knee. States pain was worse with activity. No previous surgeries for any other joints. Underwent R knee replacement on 4/9, IM consulted post op for medical management. Patient has PMH significant for DM, currently only on PO medications. No other significant PMH per patient. Follows with PCP for DM management. Past Medical History:     Past Medical History:   Diagnosis Date    Arthritis     BILATERAL KNEES    Gestational diabetes     FIRST PREGNANCY ONLY     Hyperlipidemia     Type 2 diabetes mellitus ([address] 06/2018    Wears glasses         Past Surgical History:     Past Surgical History:   Procedure Laterality Date    TOTAL KNEE ARTHROPLASTY Right 04/09/2019        Medications Prior to Admission:     Prior to Admission medications    Medication Sig Start Date End Date Taking? Authorizing Provider   oxyCODONE-acetaminophen (PERCOCET) 5-325 MG per tablet Take 1-2 tablets by mouth every 6 hours as needed for Pain for up to 7 days.  4/10/19 4/17/19 Yes Marianela Washington DO   docusate sodium (COLACE) 100 MG capsule Take 1 capsule by mouth 2 times daily as needed for Constipation 4/10/19  Yes Marianela Washington,    aspirin EC 81 MG EC urination, frequency   INTEGUMENT:  negative for rash, skin lesions, easy bruising   HEMATOLOGIC/LYMPHATIC:  negative for swelling/edema   ALLERGIC/IMMUNOLOGIC:  negative for urticaria , itching  ENDOCRINE:  negative increase in drinking, increase in urination, hot or cold intolerance  MUSCULOSKELETAL:  Positive for knee pain   NEUROLOGICAL:  negative for headaches, dizziness, lightheadedness, numbness, pain, tingling extremities  BEHAVIOR/PSYCH:  negative for depression, anxiety    Physical Exam:     /64   Pulse 74   Temp 97.9 °F (36.6 °C) (Oral)   Resp 18   Ht 5' 5\" (1.651 m)   Wt 175 lb (79.4 kg)   SpO2 96%   BMI 29.12 kg/m²   Temp (24hrs), Av.4 °F (35.8 °C), Min:90.5 °F (32.5 °C), Max:97.9 °F (36.6 °C)    Recent Labs     19  1220 19  1721   POCGLU 113* 178*       Intake/Output Summary (Last 24 hours) at 4/10/2019 0846  Last data filed at 2019 1816  Gross per 24 hour   Intake 1650 ml   Output 450 ml   Net 1200 ml       General Appearance:  alert, well appearing, and in no acute distress  Mental status: oriented to person, place, and time with normal affect  Head:  normocephalic, atraumatic.   Eye: no icterus, redness, pupils equal and reactive, extraocular eye movements intact, conjunctiva clear  Ear: normal external ear, no discharge, hearing intact  Nose:  no drainage noted  Mouth: mucous membranes moist  Neck: supple, no carotid bruits, thyroid not palpable  Lungs: Bilateral equal air entry, clear to ausculation, no wheezing  Cardiovascular: normal rate, regular rhythm  Abdomen: Soft, nontender, nondistended, normal bowel sounds  Neurologic: There are no new focal motor or sensory deficits, normal muscle tone and bulk, no abnormal sensation, normal speech, cranial nerves II through XII grossly intact  Skin: No gross lesions, rashes, bruising or bleeding on exposed skin area  Extremities:  peripheral pulses palpable, no edema or calf pain with palpation  Psych: normal affect    Investigations:      Laboratory Testing:  Recent Results (from the past 24 hour(s))   POC Glucose Fingerstick    Collection Time: 04/09/19 12:20 PM   Result Value Ref Range    POC Glucose 113 (H) 65 - 105 mg/dL   POC Glucose Fingerstick    Collection Time: 04/09/19  5:21 PM   Result Value Ref Range    POC Glucose 178 (H) 65 - 105 mg/dL   CBC auto differential    Collection Time: 04/10/19  6:24 AM   Result Value Ref Range    WBC 14.8 (H) 3.5 - 11.3 k/uL    RBC 3.93 (L) 3.95 - 5.11 m/uL    Hemoglobin 11.1 (L) 11.9 - 15.1 g/dL    Hematocrit 35.5 (L) 36.3 - 47.1 %    MCV 90.3 82.6 - 102.9 fL    MCH 28.2 25.2 - 33.5 pg    MCHC 31.3 28.4 - 34.8 g/dL    RDW 12.8 11.8 - 14.4 %    Platelets 316 356 - 148 k/uL    MPV 12.1 8.1 - 13.5 fL    NRBC Automated 0.0 0.0 per 100 WBC    Differential Type NOT REPORTED     WBC Morphology NOT REPORTED     RBC Morphology NOT REPORTED     Platelet Estimate NOT REPORTED     Seg Neutrophils 85 (H) 36 - 65 %    Lymphocytes 9 (L) 24 - 43 %    Monocytes 6 3 - 12 %    Eosinophils % 0 (L) 1 - 4 %    Basophils 0 0 - 2 %    Immature Granulocytes 0 0 %    Segs Absolute 12.53 (H) 1.50 - 8.10 k/uL    Absolute Lymph # 1.26 1.10 - 3.70 k/uL    Absolute Mono # 0.89 0.10 - 1.20 k/uL    Absolute Eos # <0.03 0.00 - 0.44 k/uL    Basophils # <0.03 0.00 - 0.20 k/uL    Absolute Immature Granulocyte 0.05 0.00 - 0.30 k/uL   Comprehensive Metabolic Panel w/ Reflex to MG    Collection Time: 04/10/19  6:24 AM   Result Value Ref Range    Glucose 159 (H) 70 - 99 mg/dL    BUN 17 8 - 23 mg/dL    CREATININE 0.66 0.50 - 0.90 mg/dL    Bun/Cre Ratio NOT REPORTED 9 - 20    Calcium 8.6 8.6 - 10.4 mg/dL    Sodium 138 135 - 144 mmol/L    Potassium 4.2 3.7 - 5.3 mmol/L    Chloride 108 (H) 98 - 107 mmol/L    CO2 21 20 - 31 mmol/L    Anion Gap 9 9 - 17 mmol/L    Alkaline Phosphatase 63 35 - 104 U/L    ALT 52 (H) 5 - 33 U/L    AST 34 (H) <32 U/L    Total Bilirubin 0.26 (L) 0.3 - 1.2 mg/dL    Total Protein 5.9 (L) 6.4 - 8.3 g/dL    Alb 3.4 (L) 3.5 - 5.2 g/dL    Albumin/Globulin Ratio 1.4 1.0 - 2.5    GFR Non-African American >60 >60 mL/min    GFR African American >60 >60 mL/min    GFR Comment          GFR Staging NOT REPORTED        Imaging/Diagonstics:  Xr Knee Right (3 Views)    Result Date: 4/9/2019  EXAMINATION: 3 XRAY VIEWS OF THE RIGHT KNEE 4/9/2019 5:56 pm COMPARISON: 8 November 2018 HISTORY: ORDERING SYSTEM PROVIDED HISTORY: post-op TKA TECHNOLOGIST PROVIDED HISTORY: In PACU please. Thanks. post-op TKA FINDINGS: Has been interval performance of a total knee arthroplasty. Hardware is in satisfactory alignment without complication. Air and edema is present in the soft tissues, expected for postoperative status. Alignment is satisfactory. S/p total knee arthroplasty with hardware in satisfactory alignment without hardware complication. Expected postoperative soft tissue changes.        Assessment :      Primary Problem  Total knee replacement status, right    Active Hospital Problems    Diagnosis Date Noted    Leukocytosis [D72.829] 04/10/2019    Total knee replacement status, right [Z96.651] 04/09/2019    Type 2 diabetes mellitus without complication, without long-term current use of insulin ([facility] [address] [E11.9] 10/29/2018    Dyslipidemia [E78.5] 06/07/2018       Plan:     - Labs/imaging reviewed  - Leukocytosis likely from steroids   - Continue home PO DM medications on d/c  - Last A1c - 6.6 (4/2)  - Continue other home medications  - PT/OT as appropriate  - Incentive spirometry as needed  - DC planning per primary  - Medically stable for dc, continue follow up with PCP for DM management     Call with questions       Consultations:   IP CONSULT TO CASE MANAGEMENT  IP CONSULT TO SOCIAL WORK  IP CONSULT TO HOSPITALIST      Marizol Pitts MD  4/10/2019  8:46 AM    Copy sent to Dr. Ramona Mcgovern MD

## 2019-04-10 NOTE — PROGRESS NOTES
Orthopedic Progress Note    Patient:  Lynnette Haley  YOB: 1955     59 y.o. female    Subjective:  Patient seen and examined this morning. Patient was able to walk to restroom yesterday with minimal pain. She is very pleasant and happy with her surgery. She has 4 steps at home and if she does well with therapy today she would be interested in going home. No complaints or concerns at this time. No issue overnight per nursing and patient. Pain controlled on current regimen. Denies fever, HA, CP, SOB, N/V, dysuria, numbness or tingling. -BM/+flatus. Tolerating PO intake. PT to evaluate and treat today. Vitals reviewed, afebrile    Objective:   Vitals:    04/09/19 2000   BP: 124/67   Pulse: 64   Resp: 16   Temp: 97.7 °F (36.5 °C)   SpO2: 99%     Gen: NAD, cooperative  Cardiovascular: Regular rate, no dependent edema, distal pulses 2+  Respiratory: Chest symmetric, no accessory muscle use, normal respirations, no audible wheezes  RLE: Optifoam on. Dressing clean, dry, intact. Thigh and leg compartments soft and compressible. TA/EHL/FHL/GS motor intact. Deep and Superficial Peroneal/Saphenous/Sural SILT. 2+ DP/PT pulse. Meds: ASA, Ancef   See rec for complete list    Impression/plan: 59 y.o. female s/p Right TKA, POD#1    -WB status: as tolerated right lower extremity.  -Complete post op Ancef.  -Pain control PO/IV Medication. Attempt to Wean IV medications. -DVT ppx: EPC, ASA.   -Ice as needed for pain and swelling (20 minutes per hour). -Encourage Incentive Spirometry use and deep breathing.   -Nothing placed under operative knee to promote maximal knee extension/prevent flexion contracture. -Maintain pillows under foot to promote knee extension while in bed. -PT/OT to evaluate and treat.  -Possible discharge home today if therapy goes well.   -Please page ortho with any questions. Patient seen and examined.   Discussed the need for medical equipment to assist with their recovery during the post-operative period. Based on the patient's current physical condition and post-operative restrictions, we have determined that they will need: bedside commode, shower chair and wheeled walker    ----------------------------------------  Cal Good DO  PGY-1, Department of [facility] [address], [place], [place]          PGY-3 Addendum    Patient seen and examined. Agree with above. Patient doing well. No issues. Satisfied with surgery. Would like to go home today. Exam as above. Plan is to mobilize with therapy. Likely DC later today. A/P as above.        Meng Lua DO PGY-3  Orthopedic Surgery Resident  Stew Gutierrez, [facility]

## 2019-04-10 NOTE — PROGRESS NOTES
Smoking Cessation - topics covered   []  Health Risks  []  Benefits of Quitting   []  Smoking Cessation  [x]  Patient has no history of tobacco use  []  Patient is former smoker. Patient quit in   [x]  No need for tobacco cessation education. []  Booklet given  []  Patient verbalizes understanding. []  Patient denies need for tobacco cessation education. []  Unable to meet with patient today. Will follow up as able.   Megan Offer  7:13 AM

## 2019-04-10 NOTE — PROGRESS NOTES
Department of Anesthesiology   Acute Pain Progress Note    Patient's Name: Nae Haley  Surgeon: No name on file.    Date: 4/10/2019    Pt Awake, Alert    Last Pain Score: (Charted in Doc Flowsheet)  Pain Level: 2    Vital Signs (Current)   Vitals:    04/10/19 0318   BP: 113/64   Pulse: 74   Resp: 18   Temp: 97.9 °F (36.6 °C)   SpO2: 96%     Vital Signs Statistics (for past 48 hrs)     Temp  Av.4 °F (35.8 °C)  Min: 90.5 °F (32.5 °C)   Min taken time: 19 1450  Max: 97.9 °F (36.6 °C)   Max taken time: 19 1702  Pulse  Av.7  Min: 59   Min taken time: 19  Max: 66   Max taken time: 19 1159  Resp  Av.5  Min: 0   Min taken time: 19 1717  Max: 24   Max taken time: 19 1159  BP  Min: 91/59   Min taken time: 19 1847  Max: 145/79   Max taken time: 19 1409  SpO2  Av.7 %  Min: 58 %   Min taken time: 19 1426  Max: 99 %   Max taken time: 19    BP Readings from Last 3 Encounters:   04/10/19 113/64   19 (!) 95/57   19 121/73       No Known Allergies  Patient Active Problem List   Diagnosis    Gestational diabetes    Dyslipidemia    Type 2 diabetes mellitus without complication, without long-term current use of insulin ([facility])    Total knee replacement status, right    Leukocytosis       Current Medications    bupivacaine 0.125% (MARCAINE) in sodium chloride 0.9% infusion 500 mL Continuous   atorvastatin (LIPITOR) tablet 20 mg Nightly   metFORMIN (GLUCOPHAGE-XR) extended release tablet 1,000 mg Daily with breakfast   SITagliptin (JANUVIA) tablet 100 mg Daily   sodium chloride flush 0.9 % injection 10 mL 2 times per day   sodium chloride flush 0.9 % injection 10 mL PRN   traMADol (ULTRAM) tablet 50 mg 3 times per day   docusate sodium (COLACE) capsule 100 mg BID   ondansetron (ZOFRAN) injection 4 mg Q6H PRN   famotidine (PEPCID) tablet 20 mg BID   aspirin EC tablet 81 mg BID   sennosides-docusate sodium (SENOKOT-S) 8.6-50 MG tablet

## 2019-04-12 ENCOUNTER — TELEPHONE (OUTPATIENT)
Dept: FAMILY MEDICINE CLINIC | Age: 64
End: 2019-04-12

## 2019-04-12 ENCOUNTER — HOSPITAL ENCOUNTER (OUTPATIENT)
Dept: PHYSICAL THERAPY | Age: 64
Setting detail: THERAPIES SERIES
Discharge: HOME | End: 2019-04-12
Payer: COMMERCIAL

## 2019-04-12 PROCEDURE — 97110 THERAPEUTIC EXERCISES: CPT

## 2019-04-12 PROCEDURE — 97161 PT EVAL LOW COMPLEX 20 MIN: CPT

## 2019-04-12 PROCEDURE — 97016 VASOPNEUMATIC DEVICE THERAPY: CPT

## 2019-04-12 NOTE — CONSULTS
[x] [facility] &  Therapy  [address]  [phone]  F: [phone] [] [address]  [address]   [address]  P: [phone]  F: [phone] [] [place]  [address]  P: [phone]  F: [phone] [] [address]  [facility]   [address]   [place]: [phone]  F: [phone]     Physical Therapy Lower Extremity Evaluation    Date:  2019  Patient: Kristen Haley  : 1955  MRN: [id number]  Physician: Srini Boswell DO  Insurance: [employer]  Medical Diagnosis: Total knee replacement status, right (N87.700 [ICD-10-CM])    Rehab Codes: M25.561, M25.661, M25.461, M62.81, R26.2  Onset date: 19               Next 's appt. : 19    Subjective:   CC: Right knee pain, right knee numbness (pt still has pain pump in); right knee swelling and stiffness; difficulty walking; difficulty with all ADL's  HPI: (onset date): Pt underwent right total knee arthroplasty on 19. Pt was discharged home and reports being compliant with HEP given to her in the hospital (quad sets, hamstring sets, heel slides, ankle pumps). **Pt lives in a house with her  and is able to live on the main floor. Pt has 5 steps with a rail to get in.        PMHx: [] Unremarkable [] Diabetes [] HTN  [] Pacemaker   [] MI/Heart Problems [] Cancer [] Arthritis [] Other:              [x] Refer to full medical chart  In EPIC     Tests: [x] X-Ray: [] MRI:  [] Other:     Medications: [x] Refer to full medical record [] None [] Other:  Allergies:      [] Refer to full medical record [x] None [] Other:    Function:  Hand Dominance  [] Right  [] Left  Working:  [] Normal Duty  [] Light Duty  [] Off D/T Condition  [x] Retired    [] Not Employed    []  Disability  [] Other: Return to work:   Job/ADL Description:      Pain:  [x] Yes  [] No Location: Right knee  Pain Rating: (0-10 scale) 3/10  Pain altered Tx:  [] Yes  [x] No  Action:  Symptoms:  [x] Improving [] Worsening [] Same  Better:  [] AM    [] PM    [x] Sit    [] Rise/Sit    []Stand    [] Walk    [] Lying    [] Other:  Worse: [] AM    [] PM    [] Sit    [] Rise/Sit    []Stand    [] Walk    [] Lying    [] Bend                             [] Valsalva    [x] Other: putting weight on leg  Sleep: [] OK    [] Disturbed    Objective:    ROM  ° A/P STRENGTH TESTS (+/-) Left Right Not Tested    Left Right Left Right Ant.  Drawer   []   Hip Flex     Post. Drawer   []   Ext     Lachmans   []   ER     Valgus Stress   []   IR     Varus Stress   []   ABD     Rubens   []   ADD     Apleys Comp.   []   Knee Flex  105  3- Apleys Dist.   []   Ext  -5  3- Hip Scouring   []   Ankle DF     ELIZABETHs   []   PF     Piriformis   []   INV     Jareds   []   EVER     Talor Tilt   []        Pat-Fem Grind   []       OBSERVATION No Deficit Deficit Not Tested Comments   Posture       Forward Head [] [] []    Rounded Shoulders [] [] []    Kyphosis [] [] []    Lordosis [] [] []    Lateral Shift [] [] []    Scoliosis [] [] []    Iliac Crest [] [] []    PSIS [] [] []    ASIS [] [] []    Genu Valgus [] [] []    Genu Varus [] [] []    Genu Recurvatum [] [] []    Pronation [] [] []    Supination [] [] []    Leg Length Discrp [] [] []    Slumped Sitting [] [] []    Palpation [] [] []    Sensation [] [] []    Edema [] [x] [] Right 44cm, Left 40cm   Neurological [] [] []    Patellar Mobility [] [] []    Patellar Orientation [] [] []    Gait [] [x] [] Analysis: Slow pace, antalgic gait, uses RW         FUNCTION Normal Difficult Unable   Sitting [] [x] []   Standing [] [x] []   Ambulation [] [x] []   Groom/Dress [] [x] []   Lift/Carry [] [x] []   Stairs [] [x] []   Bending [] [x] []   Squat [] [] [x]   Kneel [] [] [x]     BALANCE/PROPRIOCEPTION              [] Not tested   Single leg stance       R                     L                                PAIN   Eyes open                             Sec. Sec                  . []    Eyes closed                          Sec. Sec                  . []        FUNCTIONAL TESTS PAIN NO PAIN COMMENTS   Step Test 4 [] []    6 [] []    8 [] []    Squat [] []        Comments:  Assessment: Pt is s/p day 3 of a right total knee replacement and presents with pain, stiffness, swelling, and weakness. Pt currently has the pain pump catheter still in place and is therefore having significant numbness and difficulty firing her quad. Pt states she has been instructed to remove it tomorrow. Pt is progressing well with her ROM. Pt would benefit well from physical therapy services in order to address all of these impairments. Problems:    [x] ? Pain:     [x] ? ROM:    [x] ? Strength:    [x] ? Function: LEFS=95% impairment   [x] ? Balance  [x] Edema:  [] Postural Deviations  [x] Gait Deviations  [] Other:      STG: (to be met in 10 treatments)  1. ? Pain: Decrease right knee pain to 2/10--goal will likely need to be updated once pain pump is removed  2. ? ROM: Increase right knee extension to 0°, flexion to 115°  3. ? Strength: Pt will have a strong quad contraction with a quad set, pt will be able to perform a SLR without quad lag  4. ? Function: Improve LEFS to 75% impairment  5. Independent with Home Exercise Programs  6. Demonstrate Knowledge of fall prevention  LTG: (to be met in 18 treatments)  1. Pt will be able to walk at least 350 feet without an assistive device and without significant gait deviations  2. Pt will be able to go up and down stairs with little difficulty  3. Pt will be able to perform full revolutions on a stationary bike without difficulty in order to prepare for bicycling outdoors  4.  Improve LEFS to 45% impairment                   Patient goals: Genuflecting completely, riding a bicycle, hiking long distances    Rehab Potential:  [x] Good  [] Fair  [] Poor   Suggested Professional Referral:  [x] No  [] Yes:  Barriers to Goal Achievement:  [x] No  [] Yes:  Domestic Concerns:  [x] No  [] Yes:    Pt. Education:  [x] Plans/Goals, Risks/Benefits discussed  [x] Home exercise program--PT POC, handout of exercises below given for HEP    Method of Education: [x] Verbal  [x] Demo  [x] Written  Comprehension of Education:  [x] Verbalizes understanding. [x] Demonstrates understanding. [] Needs Review. [] Demonstrates/verbalizes understanding of HEP/Ed previously given. Treatment Plan:  [x] Therapeutic Exercise    [x] Modalities:  [] Dry Needling  [x] Therapeutic Activity    [] Ultrasound  [x] Electrical Stimulation  [x] Gait Training     [] Massage       [] Lumbar/Cervical Traction  [x] Neuromuscular Re-education [] Cold/hotpack [] Iontophoresis: 4 mg/mL  [x] Instruction in HEP             Dexamethasone Sodium  [x] Manual Therapy             Phosphate  mAmin  [] Aquatic Therapy  [x] Vasocompression/    [] Other:       Game Ready    []  Medication allergies reviewed for use of    Dexamethasone Sodium Phosphate 4mg/ml     with iontophoresis treatments. Pt is not allergic.     Frequency:  2-3 x/week for 18 visits        Todays Treatment:  Modalities:   Precautions:  Exercises:  Exercise Reps/ Time Weight/ Level Comments   QS 10x     Hamstring sets 10x     Heel slides 10x AA    Ankle pumps 10x     SAQ   Pt unable to assist   Other: Pt educated to trial SAQ and LAQ once the pump is removed and numbness has improved    Specific Instructions for next treatment:      Evaluation Complexity:  History (Personal factors, comorbidities) [x] 0 [] 1-2 [] 3+   Exam (limitations, restrictions) [] 1-2 [x] 3 [] 4+   Clinical presentation (progression) [] Stable [x] Evolving  [] Unstable   Decision Making [x] Low [] Moderate [] High    [x] Low Complexity [] Moderate Complexity [] High Complexity

## 2019-04-12 NOTE — TELEPHONE ENCOUNTER
E[facility] Transitions Initial Follow Up Call    Outreach made within 2 business days of discharge: Yes    Patient: Reinaldo Haley Patient : 1955   MRN: [id number]  Reason for Admission: There are no discharge diagnoses documented for the most recent discharge. Discharge Date: 4/10/19       Spoke with: Patient to follow up with surgeon     Discharge department/facility: [facility]    TCM Interactive Patient Contact:  Was patient able to fill all prescriptions: N/A  Was patient instructed to bring all medications to the follow-up visit: Yes - N/A  Is patient taking all medications as directed in the discharge summary?  Yes  Does patient understand their discharge instructions: Yes  Does patient have questions or concerns that need addressed prior to 7-14 day follow up office visit: no    Scheduled appointment with PCP within 7-14 days    Follow Up  Future Appointments   Date Time Provider Michael Gonsalez   2019 12:30 PM Shiloh Ashby PTA [facility]   2019  2:30 PM Shayna Gary PTA [facility]   2019  9:40 AM DO Estella Miles

## 2019-04-16 ENCOUNTER — HOSPITAL ENCOUNTER (OUTPATIENT)
Dept: PHYSICAL THERAPY | Age: 64
Setting detail: THERAPIES SERIES
Discharge: HOME | End: 2019-04-16
Payer: COMMERCIAL

## 2019-04-16 PROCEDURE — 97016 VASOPNEUMATIC DEVICE THERAPY: CPT

## 2019-04-16 PROCEDURE — 97110 THERAPEUTIC EXERCISES: CPT

## 2019-04-16 NOTE — FLOWSHEET NOTE
[x] [facility]  [address]  [phone]  F: [phone] [] [address]  [address]   [address]  P: [phone]  F: [phone] [] [place]  [address]  P: [phone]  F: [phone] [] [address]  [facility]   [address]  [place]: [phone]  F: [phone]     Physical Therapy Daily Treatment Note    Date:  2019  Patient Name:  Khang Haley    :  1955  MRN: [id number]   Patient: Khang Haley                    : 1955                      MRN: [id number]  Physician: Kristyn Benitez DO                       Insurance: [employer]  Medical Diagnosis: Total knee replacement status, right (J72.622 [ICD-10-CM])               Rehab Codes: M25.561, M25.661, M25.461, M62.81, R26.2  Onset date: 19                             Next Dr's appt. : 19  Visit# / total visits:   Cancels/No Shows: 0/0    Subjective:   Reports knee is stiff and swollen. Pump removed. Foam padding dressing in place. States R distal LE and around knee are still numb/decreased sensation. Pain:  [x] Yes  [] No Location:   Pain Rating: (0-10 scale) 2/10 at rest.   Pain altered Tx:  [x] No  [] Yes  Action:  Comments:    Objective:   Modalities:  Vaso compression mild compression 15 mins  38 degrees with wedge post ex. Precautions:  Exercises:  Exercise Reps/ Time Weight/ Level Comments   Nu step  5 mins L1 Added          Gait training 50 ft x1   VC on sequencing and promoting heel toe gait. Adjusted walker height. 4/                Standing   Added all standing    gastro stretch 3x20\"  wedge   Heel raises 15x     3 way  10x ea  R LE OKC   Sitting      Sitting hip flexion  10x P/AA Added    Heel slides  Static flexion  Static outdoors  4. Improve LEFS to 45% impairment                    Patient goals: Genuflecting completely, riding a bicycle, hiking long distances           Pt. Education:  [x] Yes  [] No  [x] Reviewed Prior HEP/Ed  Method of Education: [x] Verbal  [x] Demo  [] Written  Comprehension of Education:  [x] Verbalizes understanding. [x] Demonstrates understanding. [x] Needs review. [] Demonstrates/verbalizes HEP/Ed previously given. Plan: [x] Continue per plan of care.    [] Other:      Time In: 1230           Time Out: 4026    Electronically signed by:  Jazmin Chappell PTA

## 2019-04-18 ENCOUNTER — HOSPITAL ENCOUNTER (OUTPATIENT)
Dept: PHYSICAL THERAPY | Age: 64
Setting detail: THERAPIES SERIES
Discharge: HOME | End: 2019-04-18
Payer: COMMERCIAL

## 2019-04-18 PROCEDURE — 97016 VASOPNEUMATIC DEVICE THERAPY: CPT

## 2019-04-18 PROCEDURE — 97530 THERAPEUTIC ACTIVITIES: CPT

## 2019-04-18 NOTE — DISCHARGE SUMMARY
Orthopedic  Discharge Summary         Patient Identification:  Mary Haley is a 59 y.o. female. :  1955  MRN: [id number]     Acct: [de-identified]   Admit Date:  2019  Discharge date and time: 4/10/2019  2:54 PM     Attending Provider: No att. providers found                                     Reason for Admission: R TKA    Discharge Diagnoses:   Patient Active Problem List   Diagnosis    Gestational diabetes    Dyslipidemia    Type 2 diabetes mellitus without complication, without long-term current use of insulin ([address]    Total knee replacement status, right    Leukocytosis    Arthritis of right knee        Consults:  IM    Procedures: R TKA    Hospital Course:   Mary Haley is a 59 y.o. female for R TKA . We discussed DC with patient and he is ok with DC. All questions and concerns were addressed at this time. Laboratory parameters were followed and optimized when possible. Time spend on discharge discussion and plannin minutes    Disposition:   Home     Discharged Condition:  Stable     Discharge Medications:       [place] [employer]   Home Medication Instructions [id number]    Printed on:19 8953   Medication Information                      aspirin EC 81 MG EC tablet  Take 1 tablet by mouth 2 times daily             atorvastatin (LIPITOR) 20 MG tablet  take 1 tablet by mouth daily             docusate sodium (COLACE) 100 MG capsule  Take 1 capsule by mouth 2 times daily as needed for Constipation             metFORMIN (GLUCOPHAGE XR) 500 MG extended release tablet  Take two tabs in the morning and one tab in the evening. metFORMIN (GLUCOPHAGE-XR) 500 MG extended release tablet  Take 2 tablets by mouth daily (with breakfast)             SITagliptin (JANUVIA) 100 MG tablet  Take 1 tablet by mouth daily                 Discharge Instructions: Follow up with Lynne Lambert MD in 3-4 weeks. Follow up with Dr. Ric Demarco 10-14 days from surgery.     [facility] acquired Infections: None     ----------------------------------------  Yung Jensen DO

## 2019-04-18 NOTE — FLOWSHEET NOTE
4/16 x   LAQ 10x AA  x   Supine       Manual    Add as approp. QS 10x     x   Hamstring sets 10x     x   Heel slides     x AA  completed in sitting this date. -   Ankle pumps 10x     x   SAQ 10x AA  x   SLR 5x2 AA Added 4/16 x   Heel prop   1mins  Education of Hip and distal LE with towel rolls. 4/16 x                         Other: Educated pt to address Heel prop and heel slides for flexion and extension 3x daily. Specific Instructions for next treatment:  STM/patella mobs as able. progress quad and hip strength and knee ROM, progress wt bearing exercises as able. Issue add'l HEP as needed. Treatment Charges: Mins Units   []  Modalities     [x]  Ther Exercise 40 3   []  Manual Therapy     []  Ther Activities     []  Aquatics     [x]  Vasocompression 15 1   []  Other     Total Treatment time 55 4       Assessment: [x] Progressing toward goals. Pt continued with therapy as listed above. Pt continues to improve gait. Added Tband resistance to 3 way hip to strengthen quad/hamstring muscles. Vasocompression received on this date. Will continue to monitor and progress towards goals as tolerated. [] No change. [x] Other:    STG: (to be met in 10 treatments)  1. ? Pain: Decrease right knee pain to 2/10--goal will likely need to be updated once pain pump is removed  2. ? ROM: Increase right knee extension to 0°, flexion to 115°  3. ? Strength: Pt will have a strong quad contraction with a quad set, pt will be able to perform a SLR without quad lag  4. ? Function: Improve LEFS to 75% impairment  5. Independent with Home Exercise Programs  6. Demonstrate Knowledge of fall prevention  LTG: (to be met in 18 treatments)  1. Pt will be able to walk at least 350 feet without an assistive device and without significant gait deviations  2. Pt will be able to go up and down stairs with little difficulty  3.  Pt will be able to perform full revolutions on a stationary bike without difficulty in order to prepare for bicycling outdoors  4. Improve LEFS to 45% impairment                    Patient goals: Genuflecting completely, riding a bicycle, hiking long distances           Pt. Education:  [x] Yes  [] No  [x] Reviewed Prior HEP/Ed  Method of Education: [x] Verbal  [x] Demo  [] Written  Comprehension of Education:  [x] Verbalizes understanding. [x] Demonstrates understanding. [x] Needs review. [] Demonstrates/verbalizes HEP/Ed previously given. Plan: [x] Continue per plan of care.    [] Other:      Time In: 1430           Time Out: 1530    Electronically signed by:  Ann Martin PTA

## 2019-04-22 ENCOUNTER — HOSPITAL ENCOUNTER (OUTPATIENT)
Dept: PHYSICAL THERAPY | Age: 64
Setting detail: THERAPIES SERIES
Discharge: HOME | End: 2019-04-22
Payer: COMMERCIAL

## 2019-04-22 DIAGNOSIS — M17.11 ARTHRITIS OF RIGHT KNEE: Primary | ICD-10-CM

## 2019-04-22 PROCEDURE — 97016 VASOPNEUMATIC DEVICE THERAPY: CPT

## 2019-04-22 PROCEDURE — 97110 THERAPEUTIC EXERCISES: CPT

## 2019-04-22 NOTE — FLOWSHEET NOTE
[x] [facility]) [facility]  [address]  [phone]  F: [phone] [] [address]  [place]   [address]  P: [phone]  F: [phone] [] [place]  [address] [facility]  P: [phone]  F: [phone] [] [address]  [facility]   [address]  [place]: [phone]  F: [phone]     Physical Therapy Daily Treatment Note    Date:  2019  Patient Name:  Raul Haley    :  1955  MRN: [id number]   Patient: Raul Haley                    : 1955                      MRN: [id number]  Physician: Kapil Velázquez DO                       Insurance: [employer]  Medical Diagnosis: Total knee replacement status, right (W76.807 [ICD-10-CM])               Rehab Codes: M25.561, M25.661, M25.461, M62.81, R26.2  Onset date: 19                             Next Dr's appt. : 19  Visit# / total visits:   Cancels/No Shows: 0/0    Subjective:  Reports swelling and tightness are decreasing. States sensation is improving. Pt walking into clinic with decreased knee flexion with Rwalker. Verbal cues to correct. MD APPT   Pain:  [x] Yes  [] No Location:   Pain Rating: (0-10 scale) 1/10 at rest. 1-2/10 when walking  Pain altered Tx:  [x] No  [] Yes  Action:  Comments:      Objective:   Modalities:  Vaso compression moderate compression 15 mins  38 degrees with wedge post ex. (increased resistance )  Precautions:  Exercises:  Exercise Reps/ Time Weight/ Level Comments    Nu step  5 mins L2 Increased resistance  x          Gait training 50 ft x1   VC on sequencing and promoting heel toe gait.     x                 Standing        gastro stretch 3x20\"  wedge x   Heel raises 15x   x   marching 15x  Added  x   HS curls 10x  Held      3 way 15x ea A R LE OKC, increased reps 4/22, but held tband from 4/18 session. ,  ADD XAVIER NEXT SESSION x   Lateral step ups 15x 4 in Added 4/22 x   Step ups  15x 2 in Added 4/22 x   TKE w/ ball today 10x5\"  verbal cues for tech, added 4/22 x   Sitting       Sitting hip flexion  10x  A  able to demonstrated AROM 4/22 x   Heel slides  Static flexion  Static extension 10x1  10x1 10 sec  10 sec  X  X   LAQ 10x  A Able to demonstrate AROM in available range 4/22 x   Supine       Manual    Add as approp. QS 10x         Hamstring sets 10x         Heel slides 5   x A  able to demonstrate AROM with slider 4/22 x   Ankle pumps 10x    ankle circles CW, CCW 4/22 x   SAQ 10x A Able to demonstrate AROM in available range 4/22 x   SLR 10 AA Added 4/16 x   Heel prop   1mins  Education of Hip and distal LE                             Other: Educated pt to address Heel prop and heel slides for flexion and extension 3x daily. 4/22/19 R knee ROM in supine:0-90 AAROM. Specific Instructions for next treatment:  STM/patella mobs as able. progress quad and hip strength and knee ROM, progress wt bearing exercises as able. Issue add'l HEP as needed. Treatment Charges: Mins Units   []  Modalities     [x]  Ther Exercise  45 3   []  Manual Therapy     []  Ther Activities     []  Aquatics     [x]  Vasocompression 15 1   []  Other     Total Treatment time  60 4       Assessment: [x] Progressing toward goals. Added step exercises and TKE for strengthening. Increased compression with vaso with good tolerance. Pt able to demonstrate AROM with sitting hip flexion, LAQ/SAQ this date in available range. Sensation improved in quads. [] No change. [x] Other:  R knee: AAROM 90, AROM  0 ext in supine.      STG: (to be met in 10 treatments)  1. ? Pain: Decrease right knee pain to 2/10--goal will likely need to be updated once pain pump is removed  2. ? ROM: Increase right knee extension to 0°, flexion to 115°  3. ? Strength: Pt will

## 2019-04-24 ENCOUNTER — OFFICE VISIT (OUTPATIENT)
Dept: ORTHOPEDIC SURGERY | Age: 64
End: 2019-04-24

## 2019-04-24 ENCOUNTER — HOSPITAL ENCOUNTER (OUTPATIENT)
Dept: PHYSICAL THERAPY | Age: 64
Setting detail: THERAPIES SERIES
Discharge: HOME | End: 2019-04-24
Payer: COMMERCIAL

## 2019-04-24 VITALS — BODY MASS INDEX: 29.16 KG/M2 | WEIGHT: 175.04 LBS | HEIGHT: 65 IN

## 2019-04-24 DIAGNOSIS — M17.11 ARTHRITIS OF RIGHT KNEE: Primary | ICD-10-CM

## 2019-04-24 DIAGNOSIS — Z96.651 S/P TOTAL KNEE ARTHROPLASTY, RIGHT: ICD-10-CM

## 2019-04-24 PROCEDURE — 97016 VASOPNEUMATIC DEVICE THERAPY: CPT

## 2019-04-24 PROCEDURE — 97110 THERAPEUTIC EXERCISES: CPT

## 2019-04-24 PROCEDURE — 99024 POSTOP FOLLOW-UP VISIT: CPT | Performed by: ORTHOPAEDIC SURGERY

## 2019-04-24 ASSESSMENT — ENCOUNTER SYMPTOMS
NAUSEA: 0
CONSTIPATION: 0
DIARRHEA: 0
COUGH: 0

## 2019-04-24 NOTE — FLOWSHEET NOTE
gastro stretch 3x20\"  wedge x   Heel raises 15x   x   marching 15x   x   HS curls 10x AA      3 way  15x ea A  x   Lateral step ups 15x 4 in  x   Step ups  15x 2 in Increase height x   TKE w/ ball today 10x5\"  verbal cues for tech x   Sitting       Sitting hip flexion  10x  A   x   Heel slides  Static flexion  Static extension 10x1  10x1 10 sec  10 sec  X  X   LAQ 10x  A Able to demonstrate AROM in available range 4/22 x   Knee flexion stretch 5x 5\" Stretch applied by therapist    Supine       Manual    Add as approp. QS 10x         Hamstring sets 10x         Heel slides 5   x A  able to demonstrate AROM with slider 4/22 x   Ankle pumps     ankle circles CW, CCW 4/22 x   SAQ 10x A Able to demonstrate AROM in available range 4/22 x   SLR 10 AA  x   Heel prop   1mins  Education of Hip and distal LE                             Other: Educated pt to address Heel prop and heel slides for flexion and extension 3x daily. 4/22/19 R knee ROM in supine:0-90 AAROM. Specific Instructions for next treatment:  STM/patella mobs as able. progress quad and hip strength and knee ROM, progress wt bearing exercises as able. Issue add'l HEP as needed. Treatment Charges: Mins Units   []  Modalities     [x]  Ther Exercise  45 3   []  Manual Therapy     []  Ther Activities     []  Aquatics     [x]  Vasocompression 15 1   []  Other     Total Treatment time  60 4       Assessment: [x] Progressing toward goals. All standing exercises done OKC and CKC this date without significant difficulty. Assist provided with standing hamstring curls as pt unable to do on her own. Pt able to perform SLR's this date with very minimal assist from therapist.        [] No change.      [] Other:     STG: (to be met in 10 treatments)  1. ? Pain: Decrease right knee pain to 2/10--goal will likely need to be updated once pain pump is removed  2. ? ROM: Increase right knee extension to 0°, flexion to 115°  3. ? Strength: Pt will have a strong quad contraction with a quad set, pt will be able to perform a SLR without quad lag  4. ? Function: Improve LEFS to 75% impairment  5. Independent with Home Exercise Programs  6. Demonstrate Knowledge of fall prevention  LTG: (to be met in 18 treatments)  1. Pt will be able to walk at least 350 feet without an assistive device and without significant gait deviations  2. Pt will be able to go up and down stairs with little difficulty  3. Pt will be able to perform full revolutions on a stationary bike without difficulty in order to prepare for bicycling outdoors  4. Improve LEFS to 45% impairment                    Patient goals: Genuflecting completely, riding a bicycle, hiking long distances           Pt. Education:  [x] Yes  [] No  [x] Reviewed Prior HEP/Ed  Method of Education: [x] Verbal  [x] Demo  [] Written  Comprehension of Education:  [x] Verbalizes understanding. [x] Demonstrates understanding. [x] Needs review. [] Demonstrates/verbalizes HEP/Ed previously given. 4/16/19:     Plan: [x] Continue per plan of care.    [] Other:      Time In:  11:05am           Time Out: 12:15pm     Electronically signed by:  Delvin Merida, PT

## 2019-04-24 NOTE — PROGRESS NOTES
[address] [place] [id number]  Dept: [phone]  Dept Fax: [phone]        Postoperative follow-up note    Subjective:   Lynnette Haley is a 59y.o. year old female who presents to our office today for postoperative followup regarding her   1. Arthritis of right knee    2. S/P total knee arthroplasty, right    . Chief Complaint   Patient presents with    Post-Op Check     right Robyn Haley  is a 59y.o. year old female who presents to our office today for postoperative follow up after having undergone a right total knee arthroplasty on 4/9/19. The patient denies fevers, chills, nausea, vomiting, diarrhea. The patient has started physical therapy. The patient states she is doing well. However she still feels a lot of tightness in the knee. The patient also states in PT yesterday the therapist was able to get her knee full flexed at 0 degrees. Review of Systems   Constitutional: Negative for chills and fever. Respiratory: Negative for cough. Gastrointestinal: Negative for constipation, diarrhea and nausea. Musculoskeletal: Positive for arthralgias (right knee). Negative for gait problem, joint swelling and myalgias. Neurological: Negative for dizziness, weakness and numbness. I have reviewed the CC, HPI, ROS, PMH, FHX, Social History, and if not present in this note, I have reviewed in the patient's chart. I agree with the documentation provided by other staff and have reviewed their documentation prior to providing my signature indicating agreement. Objective :   General: Lynnette Haley is a 59 y.o. female who is alert and oriented and sitting comfortably in our office. Ortho Exam  MS:   On evaluation of the right knee knee range of motion is 12-95. Knee incision is healing well without signs of infection. Calves are supple bilaterally. Mild diffuse swelling right is noted.    M/S/V intact RLE without focal deficits. Neuro: alert. oriented  Eyes: Extra-ocular muscles intact  Mouth: Oral mucosa moist. No perioral lesions  Pulm: Respirations unlabored and regular. Skin: warm, well perfused  Psych:   Patient has good fund of knowledge and displays understanging of exam, diagnosis, and plan. Radiology:     Xr Knee Right (3 Views)    Result Date: 4/24/2019  History:  Right  total knee arthroplasty Findings: AP, lateral, merchant view x-rays of the Right  knee done in the office standing today shows Right total knee arthroplasty in good position without  complications. No loosening of components is appreciated. No evidence of fracture, subluxation, dislocation, radiopaque foreign body, repeat tumors noted. Alignment is near-anatomic. Impression: Status post Right total knee arthroplasty as described above. Xr Knee Right (3 Views)    Result Date: 4/9/2019  EXAMINATION: 3 XRAY VIEWS OF THE RIGHT KNEE 4/9/2019 5:56 pm COMPARISON: 8 November 2018 HISTORY: ORDERING SYSTEM PROVIDED HISTORY: post-op TKA TECHNOLOGIST PROVIDED HISTORY: In PACU please. Thanks. post-op TKA FINDINGS: Has been interval performance of a total knee arthroplasty. Hardware is in satisfactory alignment without complication. Air and edema is present in the soft tissues, expected for postoperative status. Alignment is satisfactory. S/p total knee arthroplasty with hardware in satisfactory alignment without hardware complication. Expected postoperative soft tissue changes. Assessment:      1. Arthritis of right knee    2. S/P total knee arthroplasty, right         Plan:      Reviewed today's x-rays with the patient. Patient is doing well. She should continue with formal therapy. I would really like her to work on full extension in therapy and at home. Patient may d/c the compression stocking when the swelling goes down. Patient notes understanding. I will see her back in 4 weeks.  Instructed the patient to call with any

## 2019-04-26 ENCOUNTER — HOSPITAL ENCOUNTER (OUTPATIENT)
Dept: PHYSICAL THERAPY | Age: 64
Setting detail: THERAPIES SERIES
Discharge: HOME | End: 2019-04-26
Payer: COMMERCIAL

## 2019-04-26 PROCEDURE — 97016 VASOPNEUMATIC DEVICE THERAPY: CPT

## 2019-04-26 PROCEDURE — 97110 THERAPEUTIC EXERCISES: CPT

## 2019-04-26 NOTE — FLOWSHEET NOTE
[x] [facility]) [facility]  [address]  [phone]  F: [phone] [] [address]  [place] [address]   [address]  P: [phone]  F: [phone] [] [place]  [address]  P: [phone]  F: [phone] [] [address]  [facility]   [address]  [place]: [phone]  F: [phone]     Physical Therapy Daily Treatment Note    Date:  2019  Patient Name:  Raul Haley    :  1955  MRN: [id number]   Patient: Raul Haley                    : 1955                      MRN: [id number]  Physician: Kapil Velázquez DO                       Insurance: [employer]  Medical Diagnosis: Total knee replacement status, right (I73.866 [ICD-10-CM])               Rehab Codes: M25.561, M25.661, M25.461, M62.81, R26.2  Onset date: 19                             Next Dr's appt. : 19  Visit# / total visits:   Cancels/No Shows: 0/0    Subjective:    Pain:  [x] Yes  [] No Location:   Pain Rating: (0-10 scale) 1/10 at rest. 3-4/10 when walking  Pain altered Tx:  [x] No  [] Yes  Action:  Comments:       Objective:   Modalities:  Vaso compression moderate compression 15 mins  38 degrees with wedge post ex. (increased resistance )  Precautions:  Exercises:  Exercise Reps/ Time Weight/ Level Comments    Nu step  5 mins L3 Increased resistance  x          Gait training 50 ft x1   VC on sequencing and promoting heel toe gait.                      Standing jeremias          gastro stretch 3x30\"  wedge x   Heel raises 15x 1 lbs Added wt  x   marching 15x 1 lbs Adde wt  x   HS curls 10x A/AA      3 way  jeremias  15x ea 1 lbs Added wt  x   Lateral step ups 20x 4 in incr reps  x   Step ups  15x 4 in Increase height 4/26 x   Step overs 10x 2 in Added  TKE w/ ball today 10x5\"  Omitted in error 4/26             Sitting       Sitting hip flexion  HEP  A       Heel slides  Static flexion  Static extension 10x1  10  x1 10 sec  10 sec  X      LAQ 10x  1lbs Added ball and 1 lbs 4/26 x   Knee flexion stretch 5x 5\" Stretch applied by therapist    Supine       Manual  4 mins   STM quad/HS off incision line, patellar mobs, eduaiton for pt. Added 4/26 x   QS 10x    added ball for vmo  x   Hamstring sets 10x         Heel slides 5x5\" A        Ankle pumps     ankle circles CW, CCW 4/22     SAQ 10x A   x   SLR 10 AA      Heel prop   1mins  Education of Hip and distal LE                             Other:  Education to address full HEP and emphasis for heel prop 3x daily. Completed exercises marked with \"x\" 4/26/19 0-94 AAROM     Specific Instructions for next treatment:  STM/patella mobs as able. progress quad and hip strength and knee ROM, progress wt bearing exercises as able. Issue add'l HEP as needed. Treatment Charges: Mins Units   []  Modalities     [x]  Ther Exercise 55   4   []  Manual Therapy     []  Ther Activities     []  Aquatics     [x]  Vasocompression 15 1   []  Other     Total Treatment time  70 5       Assessment: [x] Progressing toward goals progressing strengthening exercises and functional exercises with adding PRE and progressing step mahendra along with adding step overs. Pt fatigues and requires sitting rest breaks. Added STM and patellar mobs this date. [] No change. [] Other:     STG: (to be met in 10 treatments)  1. ? Pain: Decrease right knee pain to 2/10--goal will likely need to be updated once pain pump is removed  2. ? ROM: Increase right knee extension to 0°, flexion to 115°  3. ? Strength: Pt will have a strong quad contraction with a quad set, pt will be able to perform a SLR without quad lag  4. ? Function: Improve LEFS to 75% impairment  5. Independent with Home Exercise Programs  6.  Demonstrate Knowledge of fall prevention  LTG: (to be met in 18 treatments)  1. Pt will be able to walk at least 350 feet without an assistive device and without significant gait deviations  2. Pt will be able to go up and down stairs with little difficulty  3. Pt will be able to perform full revolutions on a stationary bike without difficulty in order to prepare for bicycling outdoors  4. Improve LEFS to 45% impairment                    Patient goals: Genuflecting completely, riding a bicycle, hiking long distances           Pt. Education:  [x] Yes  [] No  [x] Reviewed Prior HEP/Ed  Method of Education: [x] Verbal  [x] Demo  [] Written  Comprehension of Education:  [x] Verbalizes understanding. [x] Demonstrates understanding. [x] Needs review. [] Demonstrates/verbalizes HEP/Ed previously given. 4/16/19:     Plan: [x] Continue per plan of care.    [] Other:      Time In:   1449        Time Out:  1425    Electronically signed by:  Krissy Alvarado PTA

## 2019-04-29 ENCOUNTER — HOSPITAL ENCOUNTER (OUTPATIENT)
Dept: PHYSICAL THERAPY | Age: 64
Setting detail: THERAPIES SERIES
Discharge: HOME | End: 2019-04-29
Payer: COMMERCIAL

## 2019-04-29 PROCEDURE — 97016 VASOPNEUMATIC DEVICE THERAPY: CPT

## 2019-04-29 PROCEDURE — 97110 THERAPEUTIC EXERCISES: CPT

## 2019-04-29 NOTE — FLOWSHEET NOTE
[x] [facility]) [facility]  [address]  [phone]  F: [phone] [] [address]  [facility]   [address]  P: [phone]  F: [phone] [] [place]  [address] [facility] [address]  P: [phone]  F: [phone] [] [address]  [facility]   [address]  [place]: [phone]  F: [phone]     Physical Therapy Daily Treatment Note    Date:  2019  Patient Name:  Kathy Haley    :  1955  MRN: [id number]   Patient: Kathy Haley                    : 1955                      MRN: [id number]  Physician: Alejandro Jeter DO                       Insurance: [employer]  Medical Diagnosis: Total knee replacement status, right (D70.849 [ICD-10-CM])               Rehab Codes: M25.561, M25.661, M25.461, M62.81, R26.2  Onset date: 19                             Next 's appt. : 19  Visit# / total visits:   Cancels/No Shows: 0/0    Subjective:  States she had increase inferior patellar knee pain when sitting at baseball game last week. No new complaint this date. Pain:  [x] Yes  [] No Location:   Pain Rating: (0-10 scale) 1/10 with walking     Pain altered Tx:  [x] No  [] Yes  Action:  Comments:       Objective:   Modalities:  Vaso compression moderate compression 15 mins  38 degrees with wedge post ex. Precautions:  Exercises:  Exercise Reps/ Time Weight/ Level Comments    Nu step  5 mins L3 I  x          Gait training 50 ft x1   Progressed to str. Cane, see below     x                 Standing jeremias          gastro stretch 3x30\"  wedge x   Heel raises 20x 1.5 lbs incr wt and reps  x   marching 20x 1.5 lbs Incr. wt and reps  x   HS curls 10x A/AA      3 way  jeremias  15x ea 1.5 lbs  incr.  Wt  x   Lateral step ups 20x 4 in  progerss to 6 inch next time.  x   Step ups  15x 4 in  progress to 6 inch next time. x   Step overs 10x 2 in Progress to 4 inch next time. x   TKE tband 12x5\" med Progressed to tband 4/29 x          Sitting       Sitting hip flexion  HEP  A       Heel slides  Static flexion  Static extension 10x1  10  x1 10 sec  10 sec         LAQ 10x  1lbs Added ball and 1 lbs 4/26 x   Knee flexion stretch 5x 5\" Stretch applied by therapist x   Supine       Manual    4 mins   STM quad/HS off incision line, patellar mobs, eduaiton for pt. Added 4/26     QS      added ball for vmo      Hamstring sets          Heel slides 10x10\"    OP with sheet  Via pt   x   Ankle pumps     ankle circles CW, CCW       SAQ w/ ball  15x  1.5 lbs  added wt and ball for VMO focus 4/29 x   SLR 4x  10x  1.5 lbs  AROM Caused pain medial knee, held wt then 4/29 4/29  x   Heel prop      Education of Hip and distal LE              Prone   Added all prone 4/29    HS curls 10x   x   HS curls with OP 5x10\"   x   Quad stretch 3x10\"   belt x           Other:   0-102 AAROM in supine. Gait: progressing to str. Cane: 50 ft x 3  education on sequencing, safety and knee flexion/toe off. Pt able to make corrections. Instructed pt to use cane in home only in middle of day when not tight from night's sleep and not fatigued at end of day. Specific Instructions for next treatment:  STM/patella mobs as able. progress quad and hip strength and knee ROM, progress wt bearing exercises as able. Issue add'l HEP as needed. Progressed gait with cane, progress functional strengthening and balance. Treatment Charges: Mins Units   []  Modalities     [x]  Ther Exercise 50  4   []  Manual Therapy     []  Ther Activities     []  Aquatics     [x]  Vasocompression 15 1   [x]  Other gait   5 -   Total Treatment time  70 5       Assessment: [x] Progressing toward goals  Progressed to str. Cane with ambulation on level surfaces. Several verbal cues for sequencing,safety, and R knee flexion. Added prone exercises and focus on knee flexion and  strengthening. [] No change. [] Other:     STG: (to be met in 10 treatments)  1. ? Pain: Decrease right knee pain to 2/10--goal will likely need to be updated once pain pump is removed  2. ? ROM: Increase right knee extension to 0°, flexion to 115°  3. ? Strength: Pt will have a strong quad contraction with a quad set, pt will be able to perform a SLR without quad lag  4. ? Function: Improve LEFS to 75% impairment  5. Independent with Home Exercise Programs  6. Demonstrate Knowledge of fall prevention  LTG: (to be met in 18 treatments)  1. Pt will be able to walk at least 350 feet without an assistive device and without significant gait deviations  2. Pt will be able to go up and down stairs with little difficulty  3. Pt will be able to perform full revolutions on a stationary bike without difficulty in order to prepare for bicycling outdoors  4. Improve LEFS to 45% impairment                    Patient goals: Genuflecting completely, riding a bicycle, hiking long distances           Pt. Education:  [x] Yes  [] No  [x] Reviewed Prior HEP/Ed  Method of Education: [x] Verbal  [x] Demo  [] Written  Comprehension of Education:  [x] Verbalizes understanding. [x] Demonstrates understanding. [x] Needs review. [] Demonstrates/verbalizes HEP/Ed previously given. 4/16/19:     Plan: [x] Continue per plan of care. [x] Other: Please ask MD for extension of PT visits to progress functional strength and flexion ROM.      Time In:  1540         Time Out:  3305    Electronically signed by:  Kim Jo PTA

## 2019-05-01 ENCOUNTER — HOSPITAL ENCOUNTER (OUTPATIENT)
Dept: PHYSICAL THERAPY | Age: 64
Setting detail: THERAPIES SERIES
Discharge: HOME | End: 2019-05-01
Payer: COMMERCIAL

## 2019-05-01 PROCEDURE — 97016 VASOPNEUMATIC DEVICE THERAPY: CPT

## 2019-05-01 PROCEDURE — 97110 THERAPEUTIC EXERCISES: CPT

## 2019-05-01 NOTE — FLOWSHEET NOTE
[x] [facility]) [facility]  [facility]  [address]  [phone]  F: [phone] [] [address]  [place] [address]   [address]  P: [phone]  F: [phone] [] [place]  [address]  P: [phone]  F: [phone] [] [address]  [facility]   [address]  [place]: [phone]  F: [phone]     Physical Therapy Daily Treatment Note    Date:  2019  Patient Name:  Ruslan Haley    :  1955  MRN: [id number]   Patient: Ruslan Haley                    : 1955                      MRN: [id number]  Physician: Dima Carroll DO                       Insurance: [employer]  Medical Diagnosis: Total knee replacement status, right (X80.941 [ICD-10-CM])               Rehab Codes: M25.561, M25.661, M25.461, M62.81, R26.2  Onset date: 19                             Next 's appt. : 19  Visit# / total visits:   Cancels/No Shows: 0/0    Subjective:  States she feel a little more energetic today. Using cane in house only(as directed) and reports she does not feel confident with balance. Pain:  [x] Yes  [] No Location:   Pain Rating: (0-10 scale) 1/10 with walking     Pain altered Tx:  [x] No  [] Yes  Action:  Comments:       Objective:   Modalities:  Vaso compression moderate compression 15 mins  38 degrees with wedge post ex. Precautions:  Exercises:  Exercise Reps/ Time Weight/ Level Comments    Nu step  6 mins L3 I  x          Gait training 50 ft x1   Progressed to str. Cane, see below                       Standing jeremias          gastro stretch 3x30\"  wedge x   Heel raises 20x 1.5 lbs   x   marching 20x 1.5 lbs  x   HS curls 10x 1.5 lbs      3 way hip tband 12x med  R LE CKC, add other LE next time.    x   3 way  jeremias  15x ea 1.5 lbs  I      Lateral step ups Aquatics     [x]  Vasocompression 15 1   [x]  Other gait      Total Treatment time  65 4       Assessment: [x] Progressing toward goals added static balance exercise and restarted tband strengthening. Demonstrated improved functional quad strength with concentric  and eccentric exercises. Pt fatigued though, held some mat exercises due to fatigue. [] No change. [] Other:     STG: (to be met in 10 treatments)  1. ? Pain: Decrease right knee pain to 2/10--goal will likely need to be updated once pain pump is removed  2. ? ROM: Increase right knee extension to 0°, flexion to 115°  3. ? Strength: Pt will have a strong quad contraction with a quad set, pt will be able to perform a SLR without quad lag  4. ? Function: Improve LEFS to 75% impairment  5. Independent with Home Exercise Programs  6. Demonstrate Knowledge of fall prevention  LTG: (to be met in 18 treatments)  1. Pt will be able to walk at least 350 feet without an assistive device and without significant gait deviations  2. Pt will be able to go up and down stairs with little difficulty  3. Pt will be able to perform full revolutions on a stationary bike without difficulty in order to prepare for bicycling outdoors  4. Improve LEFS to 45% impairment                    Patient goals: Genuflecting completely, riding a bicycle, hiking long distances      Pt. Education:  [x] Yes  [] No  [x] Reviewed Prior HEP/Ed  Method of Education: [x] Verbal  [x] Demo  [] Written  Comprehension of Education:  [x] Verbalizes understanding. [x] Demonstrates understanding. [x] Needs review. [] Demonstrates/verbalizes HEP/Ed previously given. 4/16/19:     Plan: [x] Continue per plan of care.    [] Other:    Time In:  2759       Time Out:  [address]     Electronically signed by:  Ender Santiago PTA

## 2019-05-03 ENCOUNTER — HOSPITAL ENCOUNTER (OUTPATIENT)
Dept: PHYSICAL THERAPY | Age: 64
Setting detail: THERAPIES SERIES
Discharge: HOME | End: 2019-05-03
Payer: COMMERCIAL

## 2019-05-03 PROCEDURE — 97110 THERAPEUTIC EXERCISES: CPT

## 2019-05-03 PROCEDURE — 97016 VASOPNEUMATIC DEVICE THERAPY: CPT

## 2019-05-03 NOTE — FLOWSHEET NOTE
[x] [facility]) [facility]  [address]  [phone]  F: [phone] [] [address]  [facility]   [address]  P: [phone]  F: [phone] [] [place]  [address]  P: [phone]  F: [phone] [] [address]  [facility]   [address]  [place]: [phone]  F: [phone]     Physical Therapy Daily Treatment Note    Date:  5/3/2019  Patient Name:  Eulalio Haley    :  1955  MRN: [id number]   Patient: Eulalio Haley                    : 1955                      MRN: [id number]  Physician: Hayley Carrizales DO                       Insurance: [employer]  Medical Diagnosis: Total knee replacement status, right (A62.785 [ICD-10-CM])               Rehab Codes: M25.561, M25.661, M25.461, M62.81, R26.2  Onset date: 19                             Next Dr's appt. : 19  Visit# / total visits:   Cancels/No Shows: 0/0    Subjective:   Report yesterday knee was hurting all day. Addressing HEP and us  Pain:  [x] Yes  [] No Location:   Pain Rating: (0-10 scale) 1/10 with walking     Pain altered Tx:  [x] No  [] Yes  Action:  Comments:       Objective:   Modalities:  Vaso compression moderate compression 15 mins  36 degrees with wedge post ex. Precautions:  Exercises:  Exercise Reps/ Time Weight/ Level Comments    Nu step  6 mins L4 Increased resistance 5/3 x          Gait training 100 ft x1   Progressed to str.  Cane, see below     x                  Standing jeremias          gastro stretch 3x30\"  wedge x   Heel raises 20x 1.5 lbs       marching 20x 1.5 lbs  x   HS curls 10x 1.5 lbs      3 way hip tband 15x med  added L LE 5/3/19 x   3 way  jeremias  15x ea 1.5 lbs        Lateral step ups 20x 6 in   x   Step ups  15x 6 in  x   Step overs 10x 4 in  x   TKE STG: (to be met in 10 treatments)  1. ? Pain: Decrease right knee pain to 2/10--goal will likely need to be updated once pain pump is removed  2. ? ROM: Increase right knee extension to 0°, flexion to 115°  3. ? Strength: Pt will have a strong quad contraction with a quad set, pt will be able to perform a SLR without quad lag  4. ? Function: Improve LEFS to 75% impairment  5. Independent with Home Exercise Programs 5/3/19 MET   Demonstrate Knowledge of fall prevention 5/3/19 MET  LTG: (to be met in 18 treatments)  1. Pt will be able to walk at least 350 feet without an assistive device and without significant gait deviations  2. Pt will be able to go up and down stairs with little difficulty  3. Pt will be able to perform full revolutions on a stationary bike without difficulty in order to prepare for bicycling outdoors  4. Improve LEFS to 45% impairment                    Patient goals: Genuflecting completely, riding a bicycle, hiking long distances      Pt. Education:  [x] Yes  [] No  [x] Reviewed Prior HEP/Ed  Method of Education: [x] Verbal  [x] Demo  [] Written  Comprehension of Education:  [x] Verbalizes understanding. [x] Demonstrates understanding. [x] Needs review. [] Demonstrates/verbalizes HEP/Ed previously given. 5/3/19: fall prevention handout. Plan: [x] Continue per plan of care.    [] Other:    Time In: 1226        Time Out:   1245    Electronically signed by:  Ender Santiago PTA

## 2019-05-06 ENCOUNTER — HOSPITAL ENCOUNTER (OUTPATIENT)
Dept: PHYSICAL THERAPY | Age: 64
Setting detail: THERAPIES SERIES
Discharge: HOME | End: 2019-05-06
Payer: COMMERCIAL

## 2019-05-06 PROCEDURE — 97110 THERAPEUTIC EXERCISES: CPT

## 2019-05-06 PROCEDURE — 97016 VASOPNEUMATIC DEVICE THERAPY: CPT

## 2019-05-06 NOTE — FLOWSHEET NOTE
Lateral  Step ups & overs 15x 6 in  progressed with up and over 5/6 x   Step ups 15x 6 in  x   Step overs 10x1  5x1 4 in   Added add'l reps 5/6 x   Heel taps  10x 4 in Added 5/6 x   TKE tband 10x10\" med    x   SLS 3x5-7 \"   x   Sitting       Sitting hip flexion  HEP  A       Heel slides  Static flexion  Static extension 10x1  10  x1 10 sec  10 sec         LAQ 15x  2 lbs Ball vmo, incr wt 5/6 x   HS curls  15x med   x   Knee flexion stretch 5x 5\" Stretch applied by therapist     Supine       Manual     mins   STM quad/HS off incision line,  Pt still with scabbing full length of incision, unable to address scar massage 5/1         QS      added ball for vmo      Hamstring sets          Heel slides 10x10\"    OP with sheet  Via pt  Last 5 reps   x   Ankle pumps     ankle circles CW, CCW       SAQ w/ ball  10x2  2 lbs ball for VMO, incr wt 5/6   x   SLR 10x 2 lbs  added wt 5/6 x   SLR w/ ER 10x A  x   Heel prop      Education of Hip and distal LE              Prone        HS curls 15x 2 lbs Progressed wt and reps 5/6 x   HS curls with OP 5x10\"       Quad stretch 3x20\"   belt x           Other: Completed exercises marked with \"x\". Instructed pt to use cane on her driveway and her sidewalk in from her her home only. -4 to 115 AAROM in supine. Gait: progressing to str. Cane: 100  ft x 1:  Verbal cues to promote increased knee flexion and heel toe gait pattern. Lifts from hip. Then added treadmill this date. Manual: scar massage education and demo againn where ever scabs absent. Pt voiced understanding for self scar massage: 4 mins. Specific Instructions for next treatment:  STM/patella mobs as able. progress quad and hip strength and knee ROM, progress wt bearing exercises as able. Issue add'l HEP as needed. Progressed gait with cane, progress functional strengthening and balance.            Treatment Charges: Mins Units   []  Modalities     [x]  Ther Exercise  40 3   [x]  Manual Therapy   4 -   [] Ther Activities     []  Aquatics     [x]  Vasocompression 15 1   [x]  Other gait   6 -   Total Treatment time  65 4       Assessment: [x] Progressing toward goals  Continue with strengthening of R LE S/P TKA with  Slow progression  of eccentric control. Fatigue easily with eccentric work   . [] No change. [] Other:     STG: (to be met in 10 treatments)  1. ? Pain: Decrease right knee pain to 2/10--goal will likely need to be updated once pain pump is removed  2. ? ROM: Increase right knee extension to 0°, flexion to 115°  3. ? Strength: Pt will have a strong quad contraction with a quad set, pt will be able to perform a SLR without quad lag  4. ? Function: Improve LEFS to 75% impairment  5. Independent with Home Exercise Programs 5/3/19 MET   Demonstrate Knowledge of fall prevention 5/3/19 MET  LTG: (to be met in 18 treatments)  1. Pt will be able to walk at least 350 feet without an assistive device and without significant gait deviations  2. Pt will be able to go up and down stairs with little difficulty  3. Pt will be able to perform full revolutions on a stationary bike without difficulty in order to prepare for bicycling outdoors  4. Improve LEFS to 45% impairment                    Patient goals: Genuflecting completely, riding a bicycle, hiking long distances      Pt. Education:  [x] Yes  [] No  [x] Reviewed Prior HEP/Ed  Method of Education: [x] Verbal  [x] Demo  [] Written  Comprehension of Education:  [x] Verbalizes understanding. [x] Demonstrates understanding. [x] Needs review. [] Demonstrates/verbalizes HEP/Ed previously given. 5/3/19: fall prevention handout. Plan: [x] Continue per plan of care.    [] Other:    Time In: 1263       Time Out:   1355     Electronically signed by:  Shazia Foley PTA

## 2019-05-08 ENCOUNTER — HOSPITAL ENCOUNTER (OUTPATIENT)
Dept: PHYSICAL THERAPY | Age: 64
Setting detail: THERAPIES SERIES
Discharge: HOME | End: 2019-05-08
Payer: COMMERCIAL

## 2019-05-08 PROCEDURE — 97016 VASOPNEUMATIC DEVICE THERAPY: CPT

## 2019-05-08 PROCEDURE — 97110 THERAPEUTIC EXERCISES: CPT

## 2019-05-08 NOTE — FLOWSHEET NOTE
[x] [facility]) [facility] &  Therapy  [address]  [phone]  F: [phone] [] [address]  [address]   [address]  P: [phone]  F: [phone] [] [place]  [address]  P: [phone]  F: [phone] [] [address]  [facility]   [address]  [place]: [phone]  F: [phone]     Physical Therapy Daily Treatment Note    Date:  2019  Patient Name:  Khang Haley    :  1955  MRN: [id number]   Patient: Khang Haley                    : 1955                      MRN: [id number]  Physician: Kristyn Benitez DO                       Insurance: [employer]  Medical Diagnosis: Total knee replacement status, right (G93.411 [ICD-10-CM])               Rehab Codes: M25.561, M25.661, M25.461, M62.81, R26.2  Onset date: 19                             Next 's appt. : 19  Visit# / total visits:   Cancels/No Shows: 0/0    Subjective:   Walked in to clinic with improved knee flexion and toe off. Using cane as recommended (in home and on driveway) and addressing scar massage. Pain:  [x] Yes  [] No Location:   Pain Rating: (0-10 scale) 1/10 with walking     Pain altered Tx:  [x] No  [] Yes  Action:  Comments:       Objective:   Modalities:  Vaso compression moderate compression 15 mins  36 degrees with wedge post ex.    Precautions:  Exercises:  Exercise Reps/ Time Weight/ Level Comments    Nu step  6 mins L4 I      cybex bike 5 mins  Manual, oscillations at the beginning, added  x   Treadmill  43mins 1.1 mph Verbal cues for R knee flexion/toe off,       Gait training 100 ft x1   Str cane,   x                  Standing jeremias          gastro stretch 3x30\"  wedge x   Heel raises 20x 2.5 lbs Progressed wt      marching 20x 2.5 lbs progressed wt Increased resistant with TKE exercise for quad strength. [] No change. [x] Other: Pt fatigue easily with eccentric quad work in wt bearing and voiced increased pain this date with heel taps. STG: (to be met in 10 treatments)  1. ? Pain: Decrease right knee pain to 2/10--goal will likely need to be updated once pain pump is removed 5/8/19 MET  2. ? ROM: Increase right knee extension to 0°, flexion to 115°, 5/8/19 0-115 AAROM, MET  3. ? Strength: Pt will have a strong quad contraction with a quad set, pt will be able to perform a SLR without quad lag  5/8/19 MET ( does demonstrate weak distal quad weakness)  4. ? Function: Improve LEFS to 75% impairment 5/8/19 55% impariment, MET  5. Independent with Home Exercise Programs 5/3/19 MET   Demonstrate Knowledge of fall prevention 5/3/19 MET    LTG: (to be met in 18 treatments)  1. Pt will be able to walk at least 350 feet without an assistive device and without significant gait deviations  2. Pt will be able to go up and down stairs with little difficulty  3. Pt will be able to perform full revolutions on a stationary bike without difficulty in order to prepare for bicycling outdoors  4. Improve LEFS to 45% impairment                    Patient goals: Genuflecting completely, riding a bicycle, hiking long distances      Pt. Education:  [x] Yes  [] No  [x] Reviewed Prior HEP/Ed  Method of Education: [x] Verbal  [x] Demo  [] Written  Comprehension of Education:  [x] Verbalizes understanding. [x] Demonstrates understanding. [x] Needs review. [] Demonstrates/verbalizes HEP/Ed previously given. 5/3/19: fall prevention handout. Plan: [x] Continue per plan of care.    [] Other:    Time In:   0811     Time Out:    1308    Electronically signed by:  Chino Benton PTA

## 2019-05-09 ENCOUNTER — HOSPITAL ENCOUNTER (OUTPATIENT)
Dept: PHYSICAL THERAPY | Age: 64
Setting detail: THERAPIES SERIES
Discharge: HOME | End: 2019-05-09
Payer: COMMERCIAL

## 2019-05-09 PROCEDURE — 97116 GAIT TRAINING THERAPY: CPT

## 2019-05-09 PROCEDURE — 97110 THERAPEUTIC EXERCISES: CPT

## 2019-05-09 PROCEDURE — 97016 VASOPNEUMATIC DEVICE THERAPY: CPT

## 2019-05-09 NOTE — FLOWSHEET NOTE
[x] [facility]) [facility] &  Therapy  [address]  [phone]  F: [phone] [] [address]  [facility]   [address]  P: [phone]  F: [phone] [] [place]  [address]  P: [phone]  F: [phone] [] [address]  [facility]   [address]  [place]: [phone]  F: [phone]     Physical Therapy Daily Treatment Note    Date:  2019  Patient Name:  Kary Haley    :  1955  MRN: [id number]   Patient: Kary Haley                    : 1955                      MRN: [id number]  Physician: Sahra Guadarrama DO                       Insurance: [employer]  Medical Diagnosis: Total knee replacement status, right (D50.810 [ICD-10-CM])               Rehab Codes: M25.561, M25.661, M25.461, M62.81, R26.2  Onset date: 19                             Next 's appt. : 19  Visit# / total visits:   Cancels/No Shows: 0/0    Subjective:      Pain:  [x] Yes  [] No Location:   Pain Rating: (0-10 scale) 2-3/10 with walking     Pain altered Tx:  [x] No  [] Yes  Action:  Comments:   Pt arrives using FWW; states she's feeling stiff today and somewhat more painful when walking. Objective:  Previously on : 0-115 AAROM/OP supine     Modalities:  Vaso compression moderate compression 15 mins  36 degrees with wedge post ex.    Precautions:  Exercises:  Exercise Reps/ Time Weight/ Level Comments    Nu step  6 mins L4 I      cybex bike 6 mins  Manual, added  x   Treadmill  4mins 1.1 mph Verbal cues for R knee flexion/toe off,       Gait training 200 ft x2   Str cane,   x                  Standing jeremias      First three are pre-gait exercises added :    Lateral weight shifts 2x10, 5\"  TKE focus; progressed to no UE support (walker in front of pt) x Forward weight shifts 2x10, 5\"   no UE support, walker in front for if needed x   Forward stepping 10x5\"  cane in LUE but progressed to no weight through LUE as becoming comfortable x   Gait train 6 min // bars No UE assist, slow gait with TKE in midstance, equal step length, and slow/controlled x          gastro stretch 3x30\"  wedge x   Heel raises 20x 2.5 lbs Progressed wt 5/8     marching 20x 2.5 lbs progressed wt 5/8    HS curls 15x 2.5 lbs progressed wt 5/8    3 way hip tband 15x med  jeremias, L LE only 5/8    Lateral  Step ups & overs 15x 6in/2.5lbs   added wt 5/8 x   Step ups 2x15 6 in/2.5lbs added wt 5/8 x   Step downs 15x 4 In Reduced reps and focused on slow control      Heel taps  2x6 4 in Reported pain pain on last 2 reps 5/8; BUE assist x   TKE tband 10x10\" hvy  incr.  Resistance 5/8     Cone taps 2x10  Added 5/9; CKC on RLE, tapping two cones with LLE without UE assist x   Squats 2x10  Added 5/9; in walker with BUE support, cues to reduce weightshift to LLE x   SL squat to table 2x5  Added 5/9; in walker with BUE support; poor ability to isolate knee flexion (compensates with trunk or hip flexion to achieve squat) x          SLS 3x5-7 \"  RESTART NEXT SESSION            Sitting       HS stretch 3x20\"  Manually by therapist on 5/9 x   LAQ 15x  2.5 lbs Ball vmo, incr wt 5/8 x   HS curls  15x med      Knee flexion stretch 5x 5\" Stretch applied by therapist            Supine       Manual    5x10\"   Knee extension overpressure by therapist d/t asymmetry noted in knee extension in supine   x   QS  10x5\"    added ball for vmo 5/8  x    Heel slides 10x10\"    OP with sheet  Via pt  Last 5 reps   x   SAQ w/ ball  10x2, 2\"   ball for VMO,held weight on 5/9 to achieve end range ext consistently x   SLR 10x 2 lbs  added wt 5/6 x          Prone        HS curls 15x 2 lbs       hip ext  15x 2 lbs Added 5/8    Quad stretch 3x20\"   Belt, towel rolls - completed manually in supine off table by therapist on 5/9 x to 75% impairment 5/8/19 55% impariment, MET  5. Independent with Home Exercise Programs 5/3/19 MET   Demonstrate Knowledge of fall prevention 5/3/19 MET    LTG: (to be met in 18 treatments)  1. Pt will be able to walk at least 350 feet without an assistive device and without significant gait deviations  2. Pt will be able to go up and down stairs with little difficulty  3. Pt will be able to perform full revolutions on a stationary bike without difficulty in order to prepare for bicycling outdoors  4. Improve LEFS to 45% impairment                    Patient goals: Genuflecting completely, riding a bicycle, hiking long distances      Pt. Education:  [x] Yes  [] No  [x] Reviewed Prior HEP/Ed  Method of Education: [x] Verbal  [x] Demo  [] Written  Comprehension of Education:  [x] Verbalizes understanding. [x] Demonstrates understanding. [x] Needs review. [] Demonstrates/verbalizes HEP/Ed previously given. 5/3/19: fall prevention handout. Plan: [x] Continue per plan of care.    [] Other:      Time In:   2:34pm     Time Out:  4:07pm    Electronically signed by:  Lakeisha Gee, PT

## 2019-05-13 ENCOUNTER — HOSPITAL ENCOUNTER (OUTPATIENT)
Dept: PHYSICAL THERAPY | Age: 64
Setting detail: THERAPIES SERIES
Discharge: HOME | End: 2019-05-13
Payer: COMMERCIAL

## 2019-05-13 PROCEDURE — 97110 THERAPEUTIC EXERCISES: CPT

## 2019-05-13 PROCEDURE — 97116 GAIT TRAINING THERAPY: CPT

## 2019-05-13 PROCEDURE — 97016 VASOPNEUMATIC DEVICE THERAPY: CPT

## 2019-05-13 NOTE — FLOWSHEET NOTE
[x] [phone]  [facility] TWELVE-STEP [facility] &  Therapy  [address]  [phone]  F: [phone] [] [address]  [place] [address]   [address]  P: [phone]  F: [phone] [] [place]  [address]  P: [phone]  F: [phone] [] [address]  [facility]   [address]  [place]: [phone]  F: [phone]     Physical Therapy Daily Treatment Note    Date:  2019  Patient Name:  Kylie Haley    :  1955  MRN: [id number]   Patient: Kylie Haley                    : 1955                      MRN: [id number]  Physician: [place], DO                       Insurance: [employer]  Medical Diagnosis: Total knee replacement status, right (B67.764 [ICD-10-CM])               Rehab Codes: M25.561, M25.661, M25.461, M62.81, R26.2  Onset date: 19                             Next Dr's appt.: TBD  Visit# / total visits:   Cancels/No Shows: 0/0    Subjective:   Walking with str. Cane in community this weekend. Demonstrates decreased knee flexion and toe off. States she cannot locate Rwalker, thinks she left it at baseball field when getting in car.( plays ball she watches. Shelbi Singleton did not get placed into car)   Pain:  [x] Yes  [] No Location:   Pain Rating: (0-10 scale) 1-2/10 reports stiffness. Pain altered Tx:  [x] No  [] Yes  Action:  Comments:       Objective:    Modalities:  Vaso compression moderate compression 15 mins  34 degrees with wedge post ex.    Precautions:  Exercises:  Exercise Reps/ Time Weight/ Level Comments    Nu step  6 mins L4 I       cybex bike 6 mins  Manual, oscillations the first minute.   x   Treadmill  4mins 1.1 mph Verbal cues for R knee flexion/toe off,       Gait training 200 ft x2   Str cane,  x                  Standing jeremias schedule 2x wk of 5/20 due to being out of town 5/24/19. R knee sitting 105 AROM        Specific Instructions for next treatment:  Lunges with UE assist; Progress eccentric quad control strength/exercises and balance. Progressed cane gait  in to community all surfaces. , add flight of stair as able. Try SL squats on TG. Treatment Charges: Mins Units   []  Modalities     [x]  Ther Exercise  50 3   []  Manual Therapy     []  Ther Activities     []  Aquatics     [x]  Vasocompression 15 1   [x]  Other gait:  10 1   Total Treatment time 75 5        Assessment: [x] Progressing toward goals Focus on eccentric quad control and TKE wth wt shifting without UE support. [] No change. [x] Other: AROM progressing and pt demonstrated good memory recall of many exercises. Fatigues with eccentric quad work and compensates with torso and hip movements due to distal quad weakness with eccentric work. STG: (to be met in 10 treatments)  1. ? Pain: Decrease right knee pain to 2/10--goal will likely need to be updated once pain pump is removed 5/8/19 MET  2. ? ROM: Increase right knee extension to 0°, flexion to 115°, 5/8/19 0-115 AAROM, MET  3. ? Strength: Pt will have a strong quad contraction with a quad set, pt will be able to perform a SLR without quad lag  5/8/19 MET ( does demonstrate weak distal quad weakness)  4. ? Function: Improve LEFS to 75% impairment 5/8/19 55% impariment, MET  5. Independent with Home Exercise Programs 5/3/19 MET   Demonstrate Knowledge of fall prevention 5/3/19 MET    LTG: (to be met in 18 treatments)  1. Pt will be able to walk at least 350 feet without an assistive device and without significant gait deviations  2. Pt will be able to go up and down stairs with little difficulty  3. Pt will be able to perform full revolutions on a stationary bike without difficulty in order to prepare for bicycling outdoors  4.  Improve LEFS to 45% impairment                    Patient goals:

## 2019-05-15 ENCOUNTER — HOSPITAL ENCOUNTER (OUTPATIENT)
Dept: PHYSICAL THERAPY | Age: 64
Setting detail: THERAPIES SERIES
Discharge: HOME | End: 2019-05-15
Payer: COMMERCIAL

## 2019-05-15 PROCEDURE — 97016 VASOPNEUMATIC DEVICE THERAPY: CPT

## 2019-05-15 PROCEDURE — 97110 THERAPEUTIC EXERCISES: CPT

## 2019-05-15 PROCEDURE — 97140 MANUAL THERAPY 1/> REGIONS: CPT

## 2019-05-15 NOTE — FLOWSHEET NOTE
[x] [facility]) [facility]  [address]  [phone]  F: [phone] [] [address]  [place]   [address]  P: [phone]  F: [phone] [] [place]  [address]  P: [phone]  F: [phone] [] [address]  [facility]   [address]  [place]: [phone]  F: [phone]     Physical Therapy Daily Treatment Note     Date:  5/15/2019  Patient Name:  Polly Haley    :  1955  MRN: [id number]   Patient: Polly Haley                    : 1955                      MRN: [id number]  Physician: Adryan Palacio DO                       Insurance: [employer]  Medical Diagnosis: Total knee replacement status, right (Z96.651 [ICD-10-CM])               Rehab Codes: M25.561, M25.661, M25.461, M62.81, R26.2  Onset date: 19                             Next 's appt.: TBD  Visit# / total visits:   Cancels/No Shows: 0/0    Subjective:  Reports knee feels stiff. Found walker. Pain:  [x] Yes  [] No Location:   Pain Rating: (0-10 scale) 1-2/10 reports stiffness. Pain altered Tx:  [x] No  [] Yes  Action:  Comments:       Objective:    Modalities:  Vaso compression moderate compression 15 mins  34 degrees with wedge post ex.    Precautions:  Exercises:  Exercise Reps/ Time Weight/ Level Comments        I       cybex bike 6 mins  Manual, oscillations the first minute.   x   Treadmill  4mins 1.1 mph Verbal cues for R knee flexion/toe off,              Standing jeremias           Lateral weight shifts 1x10, 5\"  TKE focus; progressed to no UE support (walker in front of pt) x   Forward /retro weight shifts 1x10, 5\"   no UE support, walker in front for if needed x   Forward stepping 10x5\"  cane in LUE but progressed to no weight through LUE as becoming comfortable     Gait without AD 10 ft x 4  Along handrail for safety but no UE support, added  x   Hurdles  6x6 ea  Fw/lateral, added x   gastro stretch 3x30\"  wedge x   Knee flexion step stretch 3x20 8 in  x   Heel raises 20x 2.5 lbs       marching 20x 2.5 lbs     HS curls 15x 2.5 lbs     3 way hip tband 15x hvy  jeremias,   x   Lateral  Step ups & overs 15x 6in/2.5lbs         Step ups 20x 6 in/2.5lbs  incr. Reps 5/13 x   Step downs 15x1   5x1 4 In   6 in    Incr. Height. X  x   Heel taps  10x 4 in Reported pain pain on last 2 reps 5/8; BUE assist x   TKE tband 10x10\" hvy  used ball 5/13 x   Star   20x  R LE CKC, L LE touches target x   Squats 10x1  n walker with BUE support, cues to reduce weightshift to LLE, buttock touches mat x   SL squat to table 5x2    in walker with BUE support; poor ability to isolate knee flexion (compensates with trunk or hip flexion to achieve squat)tactile cues x          SLS 3x5-12 sec     x          Sitting       HS stretch 3x20\"    x   LAQ 15x  2.5 lbs Ball vmo, incr wt 5/8 x   HS curls  15x hvy Incr. resistance x   Knee flexion stretch 5x 5\" Stretch applied by therapist            Supine       Manual   x   Knee extension overpressure by therapist d/t asymmetry noted in knee extension in supine  Manual: scar massage cross friction and patellar  Mobs 6 mins,  Education for home scar massage       x    QS  10x5\"    added ball for vmo        Heel slides 10x10\"    OP with sheet  Via pt  Last 5 reps  x    SAQ w/ ball  15x  1.5 lbs ball for VMO, reduced wt 5/15 x   SLR 10x 2.5 lbs  added wt 5/6 x   SLR w/ ER  10x 2.5 1 lbs Added 5/13     Prone        HS curls 15x 2.5 lbs   x    hip ext  15x 2.5 lbs        Quad stretch 3x20\"   Belt, towel rolls -  x           Other: Pt only wanted to schedule 2x wk of 5/20 due to being out of town 5/24/19. R knee sitting 105 AROM.   Instructed pt to use cane in community except if raining of in big crowds 5/15      Specific Instructions for next treatment: Lunges with UE assist; Progress eccentric quad control strength/exercises and balance. Progressed cane gait  in to community all surfaces. , add flight of stair as able. Try SL squats on TG. Treatment Charges: Mins Units   []  Modalities     [x]  Ther Exercise  50 3   []  Manual Therapy     []  Ther Activities     []  Aquatics     [x]  Vasocompression 15 1   [x]  Other gait:  10 1   Total Treatment time 75 5        Assessment: [x] Progressing toward goals Focus on manual for scar massage and patella mobs due to scar tissue, poor patellar glide and pt lag with heel slides. Education for pt to address with HEP. Added dynamic balance exercises with hurdles and gait without device for hip/knee flexion toes off and stability. [] No change. [] Other:     STG: (to be met in 10 treatments)  1. ? Pain: Decrease right knee pain to 2/10--goal will likely need to be updated once pain pump is removed 5/8/19 MET  2. ? ROM: Increase right knee extension to 0°, flexion to 115°, 5/8/19 0-115 AAROM, MET  3. ? Strength: Pt will have a strong quad contraction with a quad set, pt will be able to perform a SLR without quad lag  5/8/19 MET ( does demonstrate weak distal quad weakness)  4. ? Function: Improve LEFS to 75% impairment 5/8/19 55% impariment, MET  5. Independent with Home Exercise Programs 5/3/19 MET   Demonstrate Knowledge of fall prevention 5/3/19 MET    LTG: (to be met in 18 treatments)  1. Pt will be able to walk at least 350 feet without an assistive device and without significant gait deviations  2. Pt will be able to go up and down stairs with little difficulty  3. Pt will be able to perform full revolutions on a stationary bike without difficulty in order to prepare for bicycling outdoors  4. Improve LEFS to 45% impairment                    Patient goals: Genuflecting completely, riding a bicycle, hiking long distances      Pt.  Education:  [x] Yes  [] No  [x] Reviewed Prior HEP/Ed  Method of Education: [x] Verbal  [x] Demo  [] Written  Comprehension of Education:  [x] Verbalizes understanding. [x] Demonstrates understanding. [x] Needs review. [] Demonstrates/verbalizes HEP/Ed previously given. 5/3/19: fall prevention handout. Plan: [x] Continue per plan of care.    [] Other:      Time In: 5687        Time Out:   3762    Electronically signed by:  Vashti Junior PTA

## 2019-05-17 ENCOUNTER — HOSPITAL ENCOUNTER (OUTPATIENT)
Dept: PHYSICAL THERAPY | Age: 64
Setting detail: THERAPIES SERIES
Discharge: HOME | End: 2019-05-17
Payer: COMMERCIAL

## 2019-05-17 PROCEDURE — 97016 VASOPNEUMATIC DEVICE THERAPY: CPT

## 2019-05-17 PROCEDURE — 97110 THERAPEUTIC EXERCISES: CPT

## 2019-05-17 PROCEDURE — 97140 MANUAL THERAPY 1/> REGIONS: CPT

## 2019-05-17 NOTE — FLOWSHEET NOTE
[x] [facility]) [facility] &  Therapy  [address]  [phone]  F: [phone] [] [address]  [place] [address]   [address]  P: [phone]  F: [phone] [] [place]  [address]  P: [phone]  F: [phone] [] [address]  [facility]   [address]  [place]: [phone]  F: [phone]     Physical Therapy Daily Treatment Note     Date:  2019  Patient Name:  Andrew Haley    :  1955  MRN: [id number]   Patient: Andrew Haley                    : 1955                      MRN: [id number]  Physician: Megan Sauer DO                       Insurance: [employer]  Medical Diagnosis: Total knee replacement status, right (H23.737 [ICD-10-CM])               Rehab Codes: M25.561, M25.661, M25.461, M62.81, R26.2  Onset date: 19                             Next Dr's appt.: TBD  Visit# / total visits: 15/18  Cancels/No Shows: 0/0    Subjective:  Nothing new to report. Addressing scar massage as recommended. Pain:  [x] Yes  [] No Location:   Pain Rating: (0-10 scale) 1-2/10 reports stiffness. Pain altered Tx:  [x] No  [] Yes  Action:  Comments:       Objective:    Modalities:  Vaso compression moderate compression 15 mins  34 degrees with wedge post ex.    Precautions:  Exercises:  Exercise Reps/ Time Weight/ Level Comments        I       cybex bike 5 mins  Manual, oscillations the first minute.   x   Treadmill  4mins 1.1 mph Verbal cues for R knee flexion/toe off,              Standing jeremias           Lateral weight shifts 1x10, 5\"  TKE focus; progressed to no UE support (walker in front of pt)     Forward /retro weight shifts 1x10, 5\"   no UE support, walker in front for if needed     Forward stepping 10x5\"  cane in LUE but progressed to no weight through LUE as becoming comfortable     Gait without AD 10 ft x 4  Along handrail for safety but no UE support, added      Hurdles  6x6 ea  Fw/lateral, added     gastro stretch 3x30\"  wedge     Knee flexion step stretch 3x20 12 in  x   Heel raises 20x 2.5 lbs       marching 20x 2.5 lbs     HS curls 15x 2.5 lbs     3 way hip tband 15x hvy  jeremias,       Lateral  Step ups & overs 15x 6in/2.5lbs         Step ups 20x 8  in  incr height 5/17 x   Step downs 15x 4 in  6 in Rely's   on UE greatly x      Heel taps  10x 6 in  incr. height  5/17 x   TKE tband 10x10\" hvy     x   Star   20x  R LE CKC, L LE touches target     Squats 10x1  n walker with BUE support, cues to reduce weightshift to LLE, buttock touches mat     SL squat to table 5x2    in walker with BUE support; poor ability to isolate knee flexion (compensates with trunk or hip flexion to achieve squat)tactile cues     SLS 3x5-12 sec         TG squats w/ tband  10x1  Tactile cue to prevent valgus, added  x   TG SL squats 10x2 l35 Added  x          Sitting       HS stretch 3x20\"   in supine, belt 5/17  x   LAQ 15x  2.0 lbs Ball vmo,       HS curls  15x hvy Incr. resistance     Knee flexion stretch 5x 5\" Stretch applied by therapist            Supine       QS w/ op  10x5  Manual assist  x   Manual  10x5\"  10 mins   Knee extension overpressure by therapist d/t asymmetry noted in knee extension in supine  Manual: scar massage cross friction and patellar  Mobs,   Education for home scar massage x    x    QS  10x5\"    added ball for vmo        Heel slides  Hip flexion 10x10\"  10x5      x   x   SAQ w/ ball  15x  1.5 lbs ball for VMO, AROM 5/17 due to medial knee pain.  x   SLR 10x 2 lbs  AROM 5/17 x   SLR w/ ER  10x 2  lbs  AROM 5/17     Quad stretching off side of mat 3x30\"  Manual op x   Prone        HS curls 15x 2  lbs        hip ext  15x 2  lbs        Quad stretch 3x20\"   Belt, towel rolls -               Other:    5/17/19 R knee 4 to 106 in supine AAROM.        Specific Instructions for next treatment:  Lunges with UE assist; Progress eccentric quad control strength/exercises and balance. Progressed cane gait  in to community all surfaces. , add flight of stair as able. Treatment Charges: Mins Units   []  Modalities     [x]  Ther Exercise 45 2   [x]  Manual Therapy 10 1   []  Ther Activities     []  Aquatics     [x]  Vasocompression 15 1   [x]  Other gait:       Total Treatment time 70 4        Assessment: [x] Progressing toward goals Focus on manual therapy: scar massage, patellar mobs and OP with quad sets and quad stretching. Education on self patellar mobs and decreased ROM with elevation. Added Total gym squats as listed in log for strengthening. Pt required tactile cues to eliminate  Valgus. [] No change. [x] Other: Pt relies on UE strength and compensates with hip/toso movement with ckc eccentric work. Weak quads. Pt demonstrated R foot pronation in current shoes today. Instructed pt to wear her tennis shoes with arch support. STG: (to be met in 10 treatments)  1. ? Pain: Decrease right knee pain to 2/10--goal will likely need to be updated once pain pump is removed 5/8/19 MET  2. ? ROM: Increase right knee extension to 0°, flexion to 115°, 5/8/19 0-115 AAROM, MET  3. ? Strength: Pt will have a strong quad contraction with a quad set, pt will be able to perform a SLR without quad lag  5/8/19 MET ( does demonstrate weak distal quad weakness)  4. ? Function: Improve LEFS to 75% impairment 5/8/19 55% impariment, MET  5. Independent with Home Exercise Programs 5/3/19 MET   Demonstrate Knowledge of fall prevention 5/3/19 MET    LTG: (to be met in 18 treatments)  1. Pt will be able to walk at least 350 feet without an assistive device and without significant gait deviations  2. Pt will be able to go up and down stairs with little difficulty  3.  Pt will be able to perform full revolutions on a stationary bike without difficulty in order to prepare for bicycling outdoors  4. Improve LEFS to 45% impairment                    Patient goals: Genuflecting completely, riding a bicycle, hiking long distances      Pt. Education:  [x] Yes  [] No  [x] Reviewed Prior HEP/Ed  Method of Education: [x] Verbal  [x] Demo  [] Written  Comprehension of Education:  [x] Verbalizes understanding. [x] Demonstrates understanding. [x] Needs review. [] Demonstrates/verbalizes HEP/Ed previously given. 5/3/19: fall prevention handout. Plan: [x] Continue per plan of care.    [] Other:      Time In: 1310        Time Out: 1435       Electronically signed by:  Hermann Mcclure PTA

## 2019-05-17 NOTE — PROGRESS NOTES
[x] [facility] &  [facility]rapy  [address]  [phone]  F: [phone] [] [address]  [place]   [address]  P: [phone]  F: [phone] [] [place]  [address]  P: [phone]  F: [phone] [] [address]  [facility]   [address]  [place]: [phone]  F: [phone]     Physical Therapy Progress Note    Date: 2019      Patient: Kristen Haley  : 1955  MRN: [id number]    Physician: Shadi Savage DO                       Insurance: [employer]  Medical Diagnosis: Total knee replacement status, right (Z96.651 [ICD-10-CM])               Rehab Codes: M25.561, M25.661, M25.461, M62.81, R26.2  Onset date: 19                             Next 's appt.: TBD  Visit# / total visits: 15/18                  Cancels/No Shows: 0/0      Subjective:  Pain:  [x] Yes  [] No       Location:         Pain Rating: (0-10 scale) 1-2/10 reports stiffness. Pain altered Tx:  [x] No  [] Yes  Action:  Comments:         Objective:  Test Measurements: Right knee ROM: 0°-107°; Pt able to perform a SLR without quad lag  Function: LEFS= 55% impairment; Pt has progressed from a rolling walker to a straight cane, pt has also begun ambulation with an assistive device within the clinic.     Assessment:  STG: (to be met in 10 treatments)  1. ? Pain: Decrease right knee pain to 2/10--goal will likely need to be updated once pain pump is removed 19 MET  2. ? ROM: Increase right knee extension to 0°, flexion to 115°, 19 4-106° AAROM, Progress made  3. ? Strength: Pt will have a strong quad contraction with a quad set, pt will be able to perform a SLR without quad lag  19 MET ( does demonstrate weak distal quad weakness)  4. ? Function: Improve LEFS to 75% impairment 5/8/19 55% impariment, MET  5. Independent with Home Exercise Programs 5/3/19 MET   Demonstrate Knowledge of fall prevention 5/3/19 MET     LTG: (to be met in 18 treatments)  1. Pt will be able to walk at least 350 feet without an assistive device and without significant gait deviations  2. Pt will be able to go up and down stairs with little difficulty  3. Pt will be able to perform full revolutions on a stationary bike without difficulty in order to prepare for bicycling outdoors  4. Improve LEFS to 45% impairment        Treatment to Date:  [x] Therapeutic Exercise    [] Modalities:  [] Therapeutic Activity    [] Ultrasound  [] Electrical Stimulation  [] Gait Training     [] Massage       [] Lumbar/Cervical Traction  [] Neuromuscular Re-education [] Cold/hotpack [] Iontophoresis: 4 mg/mL  [x] Instruction in Home Exercise Program                     Dexamethasone Sodium  [x] Manual Therapy             Phosphate 40-80 mAmin  [] Aquatic Therapy                   [x] Vasocompression/   [] Other:  [] Dry Needling                                           Game Ready        Patient Status:     [x] Continue per initial plan of care. [x] Additional visits necessary in order to further address quad weakness and improved functional mobility. [] Other:     Requested Frequency/Duration:  2 times per week for 12 treatments. Electronically signed by Shayna Schmitz PT on 5/17/2019 at 1:23 PM      If you have any questions or concerns, please don't hesitate to call. Thank you for your referral.    Physician Signature:________________________________Date:__________________  By signing above or cosigning this note, I have reviewed this plan of care and certify a need for medically necessary rehabilitation services.      *PLEASE SIGN ABOVE AND FAX BACK ALL PAGES*

## 2019-05-20 ENCOUNTER — HOSPITAL ENCOUNTER (OUTPATIENT)
Dept: PHYSICAL THERAPY | Age: 64
Setting detail: THERAPIES SERIES
Discharge: HOME | End: 2019-05-20
Payer: COMMERCIAL

## 2019-05-20 PROCEDURE — 97140 MANUAL THERAPY 1/> REGIONS: CPT

## 2019-05-20 PROCEDURE — 97110 THERAPEUTIC EXERCISES: CPT

## 2019-05-20 PROCEDURE — 97016 VASOPNEUMATIC DEVICE THERAPY: CPT

## 2019-05-20 NOTE — FLOWSHEET NOTE
[x] [facility]) [facility]  [address]  [phone]  F: [phone] [] [address]  [place] [address]   [address]  P: [phone]  F: [phone] [] [place]  [address]  P: [phone]  F: [phone] [] [address]  [facility]   [address]  [place]: [phone]  F: [phone]     Physical Therapy Daily Treatment Note     Date:  2019  Patient Name:  Sandip Haley    :  1955  MRN: [id number]   Patient: Sandip Haley                    : 1955                      MRN: [id number]  Physician: Kelin Booth DO                       Insurance: [employer]  Medical Diagnosis: Total knee replacement status, right (M04.978 [ICD-10-CM])               Rehab Codes: M25.561, M25.661, M25.461, M62.81, R26.2  Onset date: 19                             Next Dr's appt.: TBD  Visit# / total visits:  ( add'l 12 approv. , 2x wk for total 30)    Cancels/No Shows: 0/0    Subjective:     Reports the scar massaging is slowly helping. Pain:  [x] Yes  [] No Location:   Pain Rating: (0-10 scale) 1-2/10 reports stiffness. Pain altered Tx:  [x] No  [] Yes  Action:  Comments:       Objective:    Modalities:  Vaso compression moderate compression 15 mins  34 degrees with wedge post ex.    Precautions:  Exercises:  Exercise Reps/ Time Weight/ Level Comments              cybex bike 5 mins  Manual, oscillations the first minute.   x   Treadmill  4mins 1.1 mph Verbal cues for R knee flexion/toe off,              Standing jeremias           Lateral weight shifts 1x10, 5\"  TKE focus; progressed to no UE support (walker in front of pt)     Forward /retro weight shifts 1x10, 5\"   no UE support, walker in front for if needed     Forward stepping 10x5\"  cane in LUE but progressed to no weight through LUE as becoming comfortable     Gait without AD 10 ft x 4  Along handrail for safety but no UE support, added      Hurdles  6x6 ea  Fw/lateral, added     gastro stretch 3x30\"  wedge  x   Knee flexion step stretch 3x20 12 in  x   Heel raises 20x 2.5 lbs       marching 20x 2.5 lbs     HS curls 15x 2.5 lbs     3 way hip tband 15x hvy  jeremias,    x   Lateral  Step ups & overs 15x 6in       x   Step ups 20x 8  in   x   Step downs 10x2  10x 4 in  6 in Rely's   on UE greatly X  x      Heel taps  10x 6 in   x   TKE tband 15x10\" hvy   Incr. Reps 5/20  x   Star   20x  R LE CKC, L LE touches target     Squats 10x1  n walker with BUE support, cues to reduce weightshift to LLE, buttock touches mat     SL squat to table 5x2    in walker with BUE support; poor ability to isolate knee flexion (compensates with trunk or hip flexion to achieve squat)tactile cues     SLS 3x5-12 sec         TG squats w/ tband  10x2  Tactile cue to prevent valgus, added  x   TG SL squats 10x2 l35 Added  x          Sitting       HS stretch 3x20\"   in supine, belt 5/17      LAQ 15x  2.0 lbs Ball vmo,    x   HS curls  15x hvy Incr. resistance  x   Knee flexion stretch 5x 5\" Stretch applied by therapist             Supine       QS w/ op  10x5  Manual assist  x   Manual  10x5\"  10 mins   Knee extension overpressure by therapist d/t asymmetry noted in knee extension in supine  Manual: scar massage cross friction and patellar  Mobs,   Education for home scar massage      QS  10x5\"    added ball for vmo        Heel slides  Hip flexion 10x10\"  10x5      x   x   SAQ w/ ball  15x  1.5 lbs ball for VMO, AROM 5/17 due to medial knee pain. SLR 10x 2 lbs  AROM 5/17 X    SLR w/ ER  10x 2  lbs  AROM 5/17     Quad stretching off side of mat 3x30\"  Manual op x   Prone        HS curls 15x 2  lbs        hip ext  15x 2  lbs        Quad stretch 3x20\"   Belt, towel rolls -               Other:    5/17/19 R knee 4 to 106 in supine AAROM. Specific Instructions for next treatment:  Lunges with UE assist; Progress eccentric quad control strength/exercises and balance. Progressed cane gait  in to community all surfaces. , add flight of stair as able. Treatment Charges: Mins Units   []  Modalities     [x]  Ther Exercise 52 3   [x]  Manual Therapy   7 1   []  Ther Activities     []  Aquatics     [x]  Vasocompression 15 1   [x]  Other gait:       Total Treatment time  75 5        Assessment: [] Progressing toward goals        [] No change. [x] Other:  Weak eccentric work with quads. Scar tissue along approx 50% of scar. Focus on scar massage and patellar mobs. STG: (to be met in 10 treatments)  1. ? Pain: Decrease right knee pain to 2/10--goal will likely need to be updated once pain pump is removed 5/8/19 MET  2. ? ROM: Increase right knee extension to 0°, flexion to 115°, 5/17/19 4-106° AAROM, Progress made  3. ? Strength: Pt will have a strong quad contraction with a quad set, pt will be able to perform a SLR without quad lag  5/8/19 MET ( does demonstrate weak distal quad weakness)  4. ? Function: Improve LEFS to 75% impairment 5/8/19 55% impariment, MET  5. Independent with Home Exercise Programs 5/3/19 MET   Demonstrate Knowledge of fall prevention 5/3/19 MET     LTG: (to be met in 18 treatments)  1. Pt will be able to walk at least 350 feet without an assistive device and without significant gait deviations  2. Pt will be able to go up and down stairs with little difficulty  3. Pt will be able to perform full revolutions on a stationary bike without difficulty in order to prepare for bicycling outdoors  4. Improve LEFS to 45% impairment      Patient goals: Genuflecting completely, riding a bicycle, hiking long distances      Pt. Education:  [x] Yes  [] No  [x] Reviewed Prior HEP/Ed  Method of Education: [x] Verbal  [x] Demo  [] Written  Comprehension of Education:  [x] Verbalizes understanding.   [x] Demonstrates understanding. [x] Needs review. [] Demonstrates/verbalizes HEP/Ed previously given. 5/3/19: fall prevention handout. Plan: [x] Continue per plan of care.    [] Other:      Time In: 4089        Time Out: 1406       Electronically signed by:  Ger Phan PTA

## 2019-05-22 ENCOUNTER — HOSPITAL ENCOUNTER (OUTPATIENT)
Dept: PHYSICAL THERAPY | Age: 64
Setting detail: THERAPIES SERIES
Discharge: HOME | End: 2019-05-22
Payer: COMMERCIAL

## 2019-05-22 PROCEDURE — 97110 THERAPEUTIC EXERCISES: CPT

## 2019-05-22 PROCEDURE — 97016 VASOPNEUMATIC DEVICE THERAPY: CPT

## 2019-05-22 PROCEDURE — 97140 MANUAL THERAPY 1/> REGIONS: CPT

## 2019-05-29 ENCOUNTER — HOSPITAL ENCOUNTER (OUTPATIENT)
Dept: PHYSICAL THERAPY | Age: 64
Setting detail: THERAPIES SERIES
Discharge: HOME | End: 2019-05-29
Payer: COMMERCIAL

## 2019-05-29 PROCEDURE — 97016 VASOPNEUMATIC DEVICE THERAPY: CPT

## 2019-05-29 PROCEDURE — 97110 THERAPEUTIC EXERCISES: CPT

## 2019-05-29 PROCEDURE — 97140 MANUAL THERAPY 1/> REGIONS: CPT

## 2019-05-29 NOTE — FLOWSHEET NOTE
[x] [facility]) [facility] &  Therapy  [address]  [phone]  F: [phone] [] [address]  [facility]   [address]  P: [phone]  F: [phone] [] [address]  [address]  P: [phone]  F: [phone] [] [address]  [facility]   [address]  [place]: [phone]  F: [phone]     Physical Therapy Daily Treatment Note     Date:  2019  Patient Name:  Junie Haley    :  1955  MRN: [id number]   Patient: Junie Haley                    : 1955                      MRN: [id number]  Physician: Elsa Pitts DO                       Insurance: [employer]  Medical Diagnosis: Total knee replacement status, right (K94.249 [ICD-10-CM])               Rehab Codes: M25.561, M25.661, M25.461, M62.81, R26.2  Onset date: 19                             Next 's appt.: TBD  Visit# / total visits:  ( add'l 12 approv. , 2x wk for total 30)    Cancels/No Shows: 0/0    Subjective:           Pain:  [x] Yes  [] No Location:   Pain Rating: (0-10 scale) 1/10 sore     Pain altered Tx:  [x] No  [] Yes  Action:  Comments:   States she did a lot of walking over weekend and no device in home. .     Objective:    Modalities:  Vaso compression moderate compression 15 mins  34 degrees with wedge post ex. Precautions:  Exercises:  Exercise Reps/ Time Weight/ Level Comments              cybex bike 5 mins  Manual, oscillations the first minute.   x   Treadmill  4mins 1.1 mph Verbal cues for R knee flexion/toe off,              Standing jeremias           Gait without device 200 ft x 1  Verbal cues for increased heel lift and push off ball of foot. Pt with R ankle pronation.  Discussed shoes with arch support in future    x   Hurdles  6x6 ea  Fw/lateral,       gastro

## 2019-05-30 ENCOUNTER — OFFICE VISIT (OUTPATIENT)
Dept: ORTHOPEDIC SURGERY | Age: 64
End: 2019-05-30

## 2019-05-30 VITALS — HEIGHT: 65 IN | BODY MASS INDEX: 29.16 KG/M2 | WEIGHT: 175.04 LBS

## 2019-05-30 DIAGNOSIS — Z96.651 S/P TOTAL KNEE ARTHROPLASTY, RIGHT: ICD-10-CM

## 2019-05-30 DIAGNOSIS — M17.11 ARTHRITIS OF RIGHT KNEE: Primary | ICD-10-CM

## 2019-05-30 PROCEDURE — 99024 POSTOP FOLLOW-UP VISIT: CPT | Performed by: ORTHOPAEDIC SURGERY

## 2019-05-30 ASSESSMENT — ENCOUNTER SYMPTOMS
WHEEZING: 0
BACK PAIN: 0
SHORTNESS OF BREATH: 0

## 2019-05-30 NOTE — PROGRESS NOTES
intact  Mouth: Oral mucosa moist. No perioral lesions  Pulm: Respirations unlabored and regular. Skin: warm, well perfused  Psych:   Patient has good fund of knowledge and displays understanging of exam, diagnosis, and plan. Radiology:     No results found. Assessment:      1. Arthritis of right knee    2. S/P total knee arthroplasty, right         Plan:      Discussed etiology and natural history of right knee replacement. The treatment options may include oral anti-inflammatories, bracing, injections, advanced imaging, activity modification, physical therapy and/or surgical intervention. The patient would like to proceed with continued physical therapy. The patient will follow up in 6 weeks. We discussed that the patient should call us with any concerns or questions. Follow up: Return in about 6 weeks (around 7/11/2019). No orders of the defined types were placed in this encounter. No orders of the defined types were placed in this encounter. BASSAM Mccall am scribing for and in the presence of Dr. Cherylene Roses. 6/2/2019 3:56 PM    I have reviewed and made changes accordingly to the work scribed by Lynn Cintron. The documentation accurately reflects work and decisions made by me. I have also reviewed documentation completed by clinical staff.     Cherylene Roses, DO, [facility] Medicine  6/2/2019 3:57 PM    This note is created with the assistance of a speech recognition program.  While intending to generate a document that actually reflects the content of the visit, the document can still have some errors including those of syntax and sound a like substitutions which may escape proof reading.  In such instances, actual meaning can be extrapolated by contextual diversion      Electronically signed by Kenney Martinez DO, FAOAO on 6/2/2019 at 3:56 PM

## 2019-05-31 ENCOUNTER — HOSPITAL ENCOUNTER (OUTPATIENT)
Dept: PHYSICAL THERAPY | Age: 64
Setting detail: THERAPIES SERIES
Discharge: HOME | End: 2019-05-31
Payer: COMMERCIAL

## 2019-05-31 PROCEDURE — 97110 THERAPEUTIC EXERCISES: CPT

## 2019-05-31 PROCEDURE — 97016 VASOPNEUMATIC DEVICE THERAPY: CPT

## 2019-05-31 NOTE — FLOWSHEET NOTE
[x] [facility]) [facility] &  Therapy  [address]  [phone]  F: [phone] [] [address]  [place]   [address]  P: [phone]  F: [phone] [] [place]  [address]  P: [phone]  F: [phone] [] [address]  [facility]   [address]  [place]: [phone]  F: [phone]     Physical Therapy Daily Treatment Note     Date:  2019  Patient Name:  Alessandra Haley    :  1955  MRN: [id number]   Patient: Alessandra Haley                    : 1955                      MRN: [id number]  Physician: Xu Alcantara DO                       Insurance: [employer]  Medical Diagnosis: Total knee replacement status, right (P78.659 [ICD-10-CM])               Rehab Codes: M25.561, M25.661, M25.461, M62.81, R26.2  Onset date: 19                             Next Dr's appt.: TBD  Visit# / total visits:  ( add'l 12 approv. , 2x wk for total 30)    Cancels/No Shows: 0/0    Subjective:           Pain:  [x] Yes  [] No Location:   Pain Rating: (0-10 scale) 1/10 sore     Pain altered Tx:  [x] No  [] Yes  Action:  Comments:   States she did a lot of walking over weekend and no device in home. .     Objective:    Modalities:  Vaso compression moderate compression 15 mins  34 degrees with wedge post ex. Precautions:  Exercises:  Exercise Reps/ Time Weight/ Level Comments              cybex bike 5 mins  Manual, oscillations the first minute. Treadmill  4mins 1.3-1.5 mph Verbal cues for R knee flexion/toe off,   x          Standing jeremias           Gait without device 200 ft x 1  Verbal cues for increased heel lift and push off ball of foot. Pt with R ankle pronation.  Discussed shoes with arch support in future    x   Hurdles  6x6 ea  Fw/lateral, gastro stretch 3x30\"  wedge  x   Heel raises 20x 2.5 lbs       marching 10x2 2.5 lbs   x   HS curls 10x2    x   3 way hip tband 15x hvy jeremias LE   x   Lateral  Step ups   15x 6in      x   Step ups 15x 8  in   x   Step downs 15x 6 in  weak quads X         Heel taps  15x 6 in  incr. Reps 5/29  x   TKE tband 15x10\" hvy R LE CKC x   Star  25x  R LE CKC, L LE touches target,   x   SL Western Ginny dead lift  7x2  retieving object from floor, finger on wall. Added 5/29  x   Wall slides 12x1 3 \" I      SLS 3x5-12 sec         TG squats  L35 10x2x5\"      x   TG SL squats L25 10x3      x   Gym stairs 4x  Reciprocal steps      Sitting       HS stretch 3x20\"         LAQ 20x  2.5 lbs Ball vmo, incr. x    HS curls  20x hvy   x    Hip flexion 10x2 2.5  lbs      Hip abd 10x2 hvy Added 5/31 x   Supine       QS w/ op  10x5  Manual assist  x   Manual  10x5\"   6 mins   Knee extension overpressure by therapist d/t asymmetry noted in knee extension in supine  Manual: scar massage cross friction and patellar  Mobs,   Education for home scar massage x  x   QS  10x5\"    added ball for vmo        Heel slides    10x10\"     2.5 lbs     x      SAQ w/ ball  15x  2lbs ball for VMO, AROM 5/17 due to medial knee pain. SLR 10x 2 lbs       SLR w/ ER  10x 2  lbs     Quad stretching off side of mat 3x30\"  Manual op    Prone        HS curls 15x 2  lbs         hip ext  15x 2  lbs        Quad stretch 3x20\"            Prone hang 4 mins 3.5 lbs Added 5/29 x           Other:  Completed exercises marked with \"x\" Rotate exercises due to pt pace, allotted time and volume of exercises. Cx PT appts  wk of 6/24 due to on vacation. 5/29/19 2-111 in supine  Sitting knee flexion AROM 109 flexion. Specific Instructions for next treatment:  ; Progress eccentric quad control strength/exercises and balance. Progressed cane gait  in to community all surfaces. , add flight of stair as able.               Treatment Charges: Mins Units   []  Modalities     [x] Ther Exercise 45 3   []  Manual Therapy     []  Ther Activities     []  Aquatics     [x]  Vasocompression 15 1   [x]  Other gait:       Total Treatment time  60 4        Assessment: [x] Progressing toward goals  Restarted treadmill walking with verbal cues for toe off to promote increased knee flexion. Weak hip flexors still present. [] No change. [x] Other:  Weak eccentric quad control, slow progress. STG: (to be met in 10 treatments)  1. ? Pain: Decrease right knee pain to 2/10--goal will likely need to be updated once pain pump is removed 5/8/19 MET  2. ? ROM: Increase right knee extension to 0°, flexion to 115°, 5/17/19 4-106° AAROM, Progress made  3. ? Strength: Pt will have a strong quad contraction with a quad set, pt will be able to perform a SLR without quad lag  5/8/19 MET ( does demonstrate weak distal quad weakness)  4. ? Function: Improve LEFS to 75% impairment 5/8/19 55% impariment, MET  5. Independent with Home Exercise Programs 5/3/19 MET   Demonstrate Knowledge of fall prevention 5/3/19 MET     LTG: (to be met in 18 treatments)  1. Pt will be able to walk at least 350 feet without an assistive device and without significant gait deviations  2. Pt will be able to go up and down stairs with little difficulty  3. Pt will be able to perform full revolutions on a stationary bike without difficulty in order to prepare for bicycling outdoors  4. Improve LEFS to 45% impairment      Patient goals: Genuflecting completely, riding a bicycle, hiking long distances      Pt. Education:  [x] Yes  [] No  [x] Reviewed Prior HEP/Ed  Method of Education: [x] Verbal  [x] Demo  [] Written  Comprehension of Education:  [x] Verbalizes understanding. [x] Demonstrates understanding. [x] Needs review. [] Demonstrates/verbalizes HEP/Ed previously given. 5/3/19: fall prevention handout. Plan: [x] Continue per plan of care.    [] Other:      Time In:   7902       Time Out:    5975    Electronically signed by:  Lina Mendoza, PTA

## 2019-06-03 ENCOUNTER — HOSPITAL ENCOUNTER (OUTPATIENT)
Dept: PHYSICAL THERAPY | Age: 64
Setting detail: THERAPIES SERIES
Discharge: HOME | End: 2019-06-03
Payer: COMMERCIAL

## 2019-06-03 PROCEDURE — 97016 VASOPNEUMATIC DEVICE THERAPY: CPT

## 2019-06-03 PROCEDURE — 97110 THERAPEUTIC EXERCISES: CPT

## 2019-06-03 NOTE — FLOWSHEET NOTE
[x] [facility]) [facility] &  Therapy  [address]  [phone]  F: [phone] [] [address]  [place] [address]   [address]  P: [phone]  F: [phone] [] [place]  [address] [facility] [address] [facility] [address]  P: [phone]  F: [phone] [] [address]  [facility]   [address]  [place]: [phone]  F: [phone]     Physical Therapy Daily Treatment Note     Date:  6/3/2019  Patient Name:  Rohit Haley    :  1955  MRN: [id number]   Patient: Rohit Haley                    : 1955                      MRN: [id number]  Physician: Ari Abreu DO                       Insurance: [employer]  Medical Diagnosis: Total knee replacement status, right (V13.756 [ICD-10-CM])               Rehab Codes: M25.561, M25.661, M25.461, M62.81, R26.2  Onset date: 19                             Next Dr's appt.: TBD  Visit# / total visits:  ( add'l 12 approv. , 2x wk for total 30)    Cancels/No Shows: 0/0    Subjective:           Pain:  [x] Yes  [] No Location:   Pain Rating: (0-10 scale) 0-1/10 sore  R knee   Pain altered Tx:  [x] No  [] Yes  Action:  Comments:   Reports knee felt better over weekend not as stiff. Walked a lot without cane. Saw Dr. Virl Goltz  and pleased with progress. (wants extension to improve). Objective:    Modalities:  Vaso compression moderate compression 15 mins  34 degrees with wedge post ex. Precautions:  Exercises:  Exercise Reps/ Time Weight/ Level Comments              cybex bike 5 mins  Manual, oscillations the first minute. Treadmill  4mins 1.3-1.5 mph Verbal cues for R knee flexion/toe off,   x          Standing jeremias           Gait without device 200 ft x 1  Verbal cues for increased heel lift and push off ball of foot. Pt with R ankle pronation. eccentric quad control strength/exercises and balance. Progressed cane gait  in to community all surfaces , add flight of stair as able. Treatment Charges: Mins Units   []  Modalities     [x]  Ther Exercise 60 4   []  Manual Therapy     []  Ther Activities     []  Aquatics     [x]  Vasocompression 15 1   [x]  Other gait:       Total Treatment time  75 5        Assessment: [x] Progressing toward goals addressing flight of standard steps with single UE support with pt demonstrating reciprocal pattern. Progressing reps for HS/quad strengthening in different planes as listed in log. Slow progress with eccentric quads strengthening. [] No change. [] Other:        STG: (to be met in 10 treatments)  1. ? Pain: Decrease right knee pain to 2/10--goal will likely need to be updated once pain pump is removed 5/8/19 MET  2. ? ROM: Increase right knee extension to 0°, flexion to 115°, 5/17/19 4-106° AAROM, Progress made  3. ? Strength: Pt will have a strong quad contraction with a quad set, pt will be able to perform a SLR without quad lag  5/8/19 MET ( does demonstrate weak distal quad weakness)  4. ? Function: Improve LEFS to 75% impairment 5/8/19 55% impariment, MET  5. Independent with Home Exercise Programs 5/3/19 MET   Demonstrate Knowledge of fall prevention 5/3/19 MET     LTG: (to be met in 18 treatments)  1. Pt will be able to walk at least 350 feet without an assistive device and without significant gait deviations  2. Pt will be able to go up and down stairs with little difficulty  3. Pt will be able to perform full revolutions on a stationary bike without difficulty in order to prepare for bicycling outdoors  4. Improve LEFS to 45% impairment      Patient goals: Genuflecting completely, riding a bicycle, hiking long distances      Pt.  Education:  [x] Yes  [] No  [x] Reviewed Prior HEP/Ed  Method of Education: [x] Verbal  [x] Demo  [] Written  Comprehension of Education:  [x] [employer] understanding. [x] Demonstrates understanding. [x] Needs review. [] Demonstrates/verbalizes HEP/Ed previously given. 4/16/19 Has written TKA exercises pre set HEP:  mat, sitting and standing exercises. 5/3/19: fall prevention handout. 6/3/19: Wall squats, TKE against wall, Squats SL with UE reaching and other UE on wall, hip flexion in sitting and standing. Plan: [x] Continue per plan of care.    [] Other:      Time In: 1330          Time Out:    8734     Electronically signed by:  Ger Phan PTA

## 2019-06-05 ENCOUNTER — HOSPITAL ENCOUNTER (OUTPATIENT)
Dept: PHYSICAL THERAPY | Age: 64
Setting detail: THERAPIES SERIES
Discharge: HOME | End: 2019-06-05
Payer: COMMERCIAL

## 2019-06-05 PROCEDURE — 97110 THERAPEUTIC EXERCISES: CPT

## 2019-06-05 PROCEDURE — 97016 VASOPNEUMATIC DEVICE THERAPY: CPT

## 2019-06-05 NOTE — FLOWSHEET NOTE
[x] [facility]) [facility] &  Therapy  [address]  [phone]  F: [phone] [] [address]  [facility]   [address]  P: [phone]  F: [phone] [] [place]  [address] [facility]  P: [phone]  F: [phone] [] [address]  [facility]   [address]  [place]: [phone]  F: [phone]     Physical Therapy Daily Treatment Note     Date:  2019  Patient Name:  Alessandra Haley    :  1955  MRN: [id number]   Patient: Alessandra Haley                    : 1955                      MRN: [id number]  Physician: Xu Alcantara DO                       Insurance: [employer]  Medical Diagnosis: Total knee replacement status, right (O50.774 [ICD-10-CM])               Rehab Codes: M25.561, M25.661, M25.461, M62.81, R26.2  Onset date: 19                             Next Dr's appt.: TBD  Visit# / total visits:  ( add'l 12 approv. , 2x wk for total 30)    Cancels/No Shows: 0/0    Subjective:    Reports today is a good day and yesterday was a bad day. (did a lot of walking and did some yard work sitting in a chair)       Pain:  [x] Yes  [] No Location:   Pain Rating: (0-10 scale) 1/10 stiff/  R knee   Pain altered Tx:  [x] No  [] Yes  Action:  Comments:      Objective:    Modalities:  Vaso compression moderate compression 15 mins  34 degrees with wedge post ex.    Precautions:  Exercises:  Exercise Reps/ Time Weight/ Level Comments              cybex bike 6 mins  Manual, oscillations the first minute.    x   Treadmill  4mins 1.3-1.5 mph Verbal cues for R knee flexion/toe off,              Standing jeremias           Hurdles  6x6 ea  Fw/lateral,  CGA x   gastro stretch 3x30\"  wedge x   Hip flexion 15x 2.5 lbs Touching 8in step x   Heel raises 20x 2.5 lbs       marching 10x2 2.5 lbs      HS curls 10x2        3 way hip tband 15x hvy jeremias LE       Lateral  Step ups   15x 6 in          Step ups 15x/2.5 lbs 8  in   x   Step downs 15x 6 in    x   Heel taps  15x 6 in    x   Step overs  15x 4 in       TKE tband 15x10\" hvy R LE CKC x   Star  10x5  R LE CKC, L LE  On towel UE support 6/5  x   SL Western Ginny dead lift  7x2  retieving object from floor, finger on wall. Wall slides 12x1 3 \" I      SLS 3x5-12 sec   touches wall often 6/3 ea LE     TG squats  L35 15x2    incr. Reps 6/3  x   TG SL squats L25 10x2      x   Flight of  stairs 11x  Reciprocal steps , one HR. Added 6/3             Sitting       HS stretch 3x20\"     x   LAQ 20x  2.5 lbs Ball vmo, Incr. x    HS curls  20x hvy    x    Hip flexion 10x2 2.5  lbs      Hip abd 10x2 hvy      Supine       QS w/ op  10x5  Manual assist      Manual  10x5\"   -      Knee extension overpressure by therapist d/t asymmetry noted in knee extension in supine  Manual: scar massage cross friction and patellar  Mobs,   Education for home scar massage           QS  10x5\"    added ball for vmo        Heel slides    10x10\"     2.5 lbs            SAQ w/ ball  15x  2.5 lbs ball for VMO, incr. Wt 6/5.   x   SLR 12x 2.5 lbs  incr wt 6/5  x   SLR w/ ER  12x 2.5  lbs incr wt 6/5 x   Quad stretching off side of mat 3x30\"  belt x   butterflys 10x2 hvy Added 6/5 x   Prone        HS curls 15x 2  lbs         hip ext  10x2 2.5  lbs     x   Quad stretch 3x20\"            Prone hang 4 mins 3.5 lbs   x           Other:  Completed exercises marked with \"x\" Rotate exercises due to pt pace, allotted time and volume of exercises. Recommended pt address walking on her sidewalk   Cx PT appts  wk of 6/24 due to on vacation. 5/29/19 2-111 in supine  Sitting knee flexion AROM 109 flexion. Specific Instructions for next treatment:  ; Progress eccentric quad control strength/exercises and balance.   Progressed cane gait  in to community all surfaces , add flight of stair as able. Treatment Charges: Mins Units   []  Modalities     [x]  Ther Exercise 50 3   []  Manual Therapy     []  Ther Activities     []  Aquatics     [x]  Vasocompression 15 1   [x]  Other gait:       Total Treatment time  65 5        Assessment: [x] Progressing toward goals  Demonstrated improved quad control with star exercises for strengthening eccentrically. Demonstrated improved knee/hip flexion/clearance with hurdles. [] No change. [] Other:        STG: (to be met in 10 treatments)  1. ? Pain: Decrease right knee pain to 2/10--goal will likely need to be updated once pain pump is removed 5/8/19 MET  2. ? ROM: Increase right knee extension to 0°, flexion to 115°, 5/17/19 4-106° AAROM, Progress made  3. ? Strength: Pt will have a strong quad contraction with a quad set, pt will be able to perform a SLR without quad lag  5/8/19 MET ( does demonstrate weak distal quad weakness)  4. ? Function: Improve LEFS to 75% impairment 5/8/19 55% impariment, MET  5. Independent with Home Exercise Programs 5/3/19 MET   Demonstrate Knowledge of fall prevention 5/3/19 MET     LTG: (to be met in 18 treatments)  1. Pt will be able to walk at least 350 feet without an assistive device and without significant gait deviations  2. Pt will be able to go up and down stairs with little difficulty  3. Pt will be able to perform full revolutions on a stationary bike without difficulty in order to prepare for bicycling outdoors  4. Improve LEFS to 45% impairment      Patient goals: Genuflecting completely, riding a bicycle, hiking long distances      Pt. Education:  [x] Yes  [] No  [x] Reviewed Prior HEP/Ed  Method of Education: [x] Verbal  [x] Demo  [] Written  Comprehension of Education:  [x] Verbalizes understanding. [x] Demonstrates understanding. [x] Needs review. [] Demonstrates/verbalizes HEP/Ed previously given. 4/16/19 Has written TKA exercises pre set HEP:  mat, sitting and standing exercises.    5/3/19:

## 2019-06-10 ENCOUNTER — HOSPITAL ENCOUNTER (OUTPATIENT)
Dept: PHYSICAL THERAPY | Age: 64
Setting detail: THERAPIES SERIES
Discharge: HOME | End: 2019-06-10
Payer: COMMERCIAL

## 2019-06-10 PROCEDURE — 97016 VASOPNEUMATIC DEVICE THERAPY: CPT

## 2019-06-10 PROCEDURE — 97110 THERAPEUTIC EXERCISES: CPT

## 2019-06-10 NOTE — FLOWSHEET NOTE
control strength/exercises and balance. Progressed cane gait in to community all surfaces , add flight of stair as able. Treatment Charges: Mins Units   []  Modalities     [x]  Ther Exercise 53 4   []  Manual Therapy     []  Ther Activities     []  Aquatics     [x]  Vasocompression 15 1   []  Other gait:      Total Treatment time 68 5        Assessment: [x] Progressing toward goals: Focused treatment on increasing neuromuscular control RLE. Increased reps of step downs, progressed step up and over to 6 in step and weight of HS curls to strengthening potential of exercise. Added forward heel taps to increase neuromuscular control RLE. Ended session with vaso to decrease pain/soreness present post exs. [] No change. [] Other:        STG: (to be met in 10 treatments)  1. ? Pain: Decrease right knee pain to 2/10--goal will likely need to be updated once pain pump is removed 5/8/19 MET  2. ? ROM: Increase right knee extension to 0°, flexion to 115°, 5/17/19 4-106° AAROM, Progress made  3. ? Strength: Pt will have a strong quad contraction with a quad set, pt will be able to perform a SLR without quad lag  5/8/19 MET ( does demonstrate weak distal quad weakness)  4. ? Function: Improve LEFS to 75% impairment 5/8/19 55% impariment, MET  5. Independent with Home Exercise Programs 5/3/19 MET   Demonstrate Knowledge of fall prevention 5/3/19 MET     LTG: (to be met in 18 treatments)  1. Pt will be able to walk at least 350 feet without an assistive device and without significant gait deviations  2. Pt will be able to go up and down stairs with little difficulty  3. Pt will be able to perform full revolutions on a stationary bike without difficulty in order to prepare for bicycling outdoors  4. Improve LEFS to 45% impairment      Patient goals: Genuflecting completely, riding a bicycle, hiking long distances      Pt.  Education:  [x] Yes  [] No  [x] Reviewed Prior HEP/Ed  Method of Education: [x] Verbal [x] Demo  [] Written  Comprehension of Education:  [x] Verbalizes understanding. [x] Demonstrates understanding. [x] Needs review. [] Demonstrates/verbalizes HEP/Ed previously given. 4/16/19 Has written TKA exercises pre set HEP:  mat, sitting and standing exercises. 5/3/19: fall prevention handout. 6/3/19: Wall squats, TKE against wall, Squats SL with UE reaching and other UE on wall, hip flexion in sitting and standing. Plan: [x] Continue per plan of care.    [] Other:      Time In:  12:45 pm Time Out:   1:58 pm       Electronically signed by:  Shavonne Phan PTA

## 2019-06-12 ENCOUNTER — HOSPITAL ENCOUNTER (OUTPATIENT)
Dept: PHYSICAL THERAPY | Age: 64
Setting detail: THERAPIES SERIES
Discharge: HOME | End: 2019-06-12
Payer: COMMERCIAL

## 2019-06-12 PROCEDURE — 97110 THERAPEUTIC EXERCISES: CPT

## 2019-06-12 PROCEDURE — 97016 VASOPNEUMATIC DEVICE THERAPY: CPT

## 2019-06-12 NOTE — FLOWSHEET NOTE
[x] [facility]) [facility] &  Therapy  [address]  [phone]  F: [phone] [] [address]  [place]   [address]  P: [phone]  F: [phone] [] [place]  [address]  P: [phone]  F: [phone] [] [address]  [facility]   [address]  [place]: [phone]  F: [phone]     Physical Therapy Daily Treatment Note     Date:  2019  Patient Name:  Polly Haley    :  1955  MRN: [id number]   Patient: Polly Haley                    : 1955                      MRN: [id number]  Physician: Adryan Palacio DO                       Insurance: [employer]  Medical Diagnosis: Total knee replacement status, right (G08.256 [ICD-10-CM])               Rehab Codes: M25.561, M25.661, M25.461, M62.81, R26.2  Onset date: 19                             Next Dr's appt.: TBD  Visit# / total visits:  ( add'l 12 approv. , 2x wk for total 30)    Cancels/No Shows: 0/0    Subjective:          Pain:  [x] Yes  [] No Location:   Pain Rating: (0-10 scale) 1/10 stiff/  R knee   Pain altered Tx:  [x] No  [] Yes  Action:  Comments:  States knee feels stiff and just sore. Addressing yard work sitting in folding chair. Has new tennis shoes with arch support donned. Objective:    Modalities:  Vaso compression moderate compression 15 mins 34 degrees in supine with wedge post ex. Precautions:  Exercises:  Exercise Reps/ Time Weight/ Level Comments               cybex bike 5 mins  Manual, oscillations the first minute.   x   Treadmill  5 mins 1.3-1.5 mph Verbal cues for R knee flexion/toe off,   x          Standing jeremias           Hurdles  6x6 ea  Fw/lateral,  CGA    gastro stretch 3x30\"  wedge    Hip flexion 15x 2.5 lbs Touching 12 in step, incr.  Height  x Heel raises 20x 2.5 lbs      marching 10x2       HS curls 10x2 2 lbs  x   3 way hip tband 15x hvy jeremias LE      Lateral  Step ups   15x 6 in         Step ups 15x/2.5 lbs 8  in   x   Step downs 2x10 6 in    x   Heel taps  2x10 6 in/4in Lateral  x   Step overs  15x 6 in       TKE tband 15x10\" hvy R LE CKC x   SL Western Ginny dead lift  7x2  retieving object from floor, finger on wall.  x   Star exercise  5x5   R LE CKC, single UE support. s  x   Wall slides 12x1 3 \" I      SLS 3x5-12 sec   touches wall often 6/3 ea LE x   TG squats  L35 15x2    I  x   TG SL squats L25 10x2     incr level next time. x   Flight of  stairs 10x  Reciprocal steps , one HR. Sitting       HS stretch 3x20\"        LAQ 20x  2.5 lbs Ball vmo, Incr. HS curls  20x hvy       Hip flexion 10x2 2.5  lbs     Hip abd 10x2 hvy      Supine       QS w/ op  10x5  Manual assist  x   Manual  10x5\"   -      Knee extension overpressure by therapist d/t asymmetry noted in knee extension in supine  Manual: scar massage cross friction and patellar  Mobs,   Education for home scar massage    QS  10x5\"    added ball for vmo     Heel slides    10x10\"     2.5 lbs  manual overpressure       SAQ w/ ball  15x  2.5 lbs ball for VMO, incr. Wt 6/5. SLR 12x 2. lbs   x   SLR w/ ER  12x 2.  lbs  x   Quad stretching off side of mat 3x30\"  belt    butterflys 10x2 hvy Added 6/5    Prone        HS curls 20x 2.5  lbs  incr. Reps 6/12 x    hip ext  10x2 2.5  lbs       Quad stretch 3x20\"         Prone hang 4 mins 3.5 lbs              Other:  Completed exercises marked with \"x\" Rotate exercises due to pt pace, allotted time and volume of exercises. Recommended pt address walking on her driveway and sidewalk without device. Cx PT appts  wk of 6/24 due to on vacation. 5/29/19 2-111 in supine  Sitting knee flexion AROM 109 flexion. Specific Instructions for next treatment: Progress eccentric quad control strength/exercises and balance.   Progressed cane gait in to community all surfaces , add flight of stair as able. Treatment Charges: Mins Units   []  Modalities     [x]  Ther Exercise 35 2   []  Manual Therapy     []  Ther Activities     []  Aquatics     [x]  Vasocompression 15 1   []  Other gait:      Total Treatment time  50 3        Assessment: [x] Progressing toward goals:  Demonstrates improved endurance with quad eccentric work. Slowly progressing with neuromuscular R LE stability. [] No change. [] Other:        STG: (to be met in 10 treatments)  1. ? Pain: Decrease right knee pain to 2/10--goal will likely need to be updated once pain pump is removed 5/8/19 MET  2. ? ROM: Increase right knee extension to 0°, flexion to 115°, 5/17/19 4-106° AAROM, Progress made  3. ? Strength: Pt will have a strong quad contraction with a quad set, pt will be able to perform a SLR without quad lag  5/8/19 MET ( does demonstrate weak distal quad weakness)  4. ? Function: Improve LEFS to 75% impairment 5/8/19 55% impariment, MET  5. Independent with Home Exercise Programs 5/3/19 MET   Demonstrate Knowledge of fall prevention 5/3/19 MET     LTG: (to be met in 18 treatments)  1. Pt will be able to walk at least 350 feet without an assistive device and without significant gait deviations  2. Pt will be able to go up and down stairs with little difficulty  3. Pt will be able to perform full revolutions on a stationary bike without difficulty in order to prepare for bicycling outdoors  4. Improve LEFS to 45% impairment      Patient goals: Genuflecting completely, riding a bicycle, hiking long distances      Pt. Education:  [x] Yes  [] No  [x] Reviewed Prior HEP/Ed  Method of Education: [x] Verbal  [x] Demo  [] Written  Comprehension of Education:  [x] Verbalizes understanding. [x] Demonstrates understanding. [x] Needs review. [] Demonstrates/verbalizes HEP/Ed previously given. 4/16/19 Has written TKA exercises pre set HEP:  mat, sitting and standing exercises. 5/3/19: fall prevention handout. 6/3/19: Wall squats, TKE against wall, Squats SL with UE reaching and other UE on wall, hip flexion in sitting and standing. Plan: [x] Continue per plan of care.    [] Other:      Time In:  12:45  Time Out:  8442         Electronically signed by:  Jacek Zamarripa PTA

## 2019-06-17 ENCOUNTER — HOSPITAL ENCOUNTER (OUTPATIENT)
Dept: PHYSICAL THERAPY | Age: 64
Setting detail: THERAPIES SERIES
Discharge: HOME | End: 2019-06-17
Payer: COMMERCIAL

## 2019-06-17 PROCEDURE — 97110 THERAPEUTIC EXERCISES: CPT

## 2019-06-17 PROCEDURE — 97016 VASOPNEUMATIC DEVICE THERAPY: CPT

## 2019-06-17 NOTE — FLOWSHEET NOTE
way hip tband 15x hvy jeremias LE, L LE CKC steam boats 6/17  x   Lateral  Step ups   15x 6 in         Step ups 15x/2.5 lbs 8  in   x   Step downs 1x10 6 in    x   Heel taps  2x10 6 in   x   Step overs  10x 6 in  single UE support, restarted 6/17 x   TKE tband 15x10\" hvy R LE CKC x   SL Western Ginny dead lift  6x2  retieving object from floor, finger on wall.  x   Star exercise  5x5   R LE CKC, single UE support. s  x   Wall slides 12x1 3 \" I      SLS 2x7-12 sec   touches wall often 6/3 ea LE x   TG squats  L35 20x2    Incr reos 6/17  x   TG SL squats L28 15x2    incr level   And reps  x   Flight of  stairs 10x  Reciprocal steps , one HR. Sitting       HS stretch 3x20\"        LAQ 20x  2.5 lbs Ball vmo, Incr. x   HS curls  20x hvy    x   Hip flexion 10x2 2.5  lbs      Hip abd 10x2 hvy      Supine       QS w/ op  10x5  Manual assist  x   Manual  10x5\"   -      Knee extension overpressure by therapist d/t asymmetry noted in knee extension in supine  Manual: scar massage cross friction and patellar  Mobs,   Education for home scar massage    QS  10x5\"    added ball for vmo     Heel slides    10x10\"            x   SAQ w/ ball  15x  2.5 lbs ball for VMO, incr. Wt 6/5.  x   SLR 12x 2. lbs   x   SLR w/ ER  12x 2.  lbs  x   Quad stretching off side of mat 3x30\"  belt    butterflys 10x2 hvy Added 6/5    Prone        HS curls 20x 2.5  lbs  incr. Reps 6/12 x    hip ext  10x2 2.5  lbs       Quad stretch 3x20\"         Prone hang 4 mins 3.5 lbs              Other:  Completed exercises marked with \"x\" Rotate exercises due to pt pace, allotted time and volume of exercises. Cx PT appts  wk of 6/24 due to on vacation. R Knee supine 1-117 AAROM            Specific Instructions for next treatment: Progress eccentric quad control strength/exercises and balance. Progressed cane gait in to community all surfaces , flight of stairs.                Treatment Charges: Mins Units   []  Modalities     [x]  Ther Exercise  60 4   [x] Manual Therapy     []  Ther Activities     []  Aquatics     [x]  Vasocompression 15 1   []  Other gait:      Total Treatment time  75 5        Assessment: [x] Progressing toward goals: focus on stability and eccentric quad control. Endurance progressing. Good memory recall of exercises. [] No change. [] Other:        STG: (to be met in 10 treatments)  1. ? Pain: Decrease right knee pain to 2/10--goal will likely need to be updated once pain pump is removed 5/8/19 MET  2. ? ROM: Increase right knee extension to 0°, flexion to 115°, 5/17/19 4-106° AAROM, Progress made  3. ? Strength: Pt will have a strong quad contraction with a quad set, pt will be able to perform a SLR without quad lag  5/8/19 MET ( does demonstrate weak distal quad weakness)  4. ? Function: Improve LEFS to 75% impairment 5/8/19 55% impariment, MET  5. Independent with Home Exercise Programs 5/3/19 MET   Demonstrate Knowledge of fall prevention 5/3/19 MET     LTG: (to be met in 18 treatments)  1. Pt will be able to walk at least 350 feet without an assistive device and without significant gait deviations  2. Pt will be able to go up and down stairs with little difficulty  3. Pt will be able to perform full revolutions on a stationary bike without difficulty in order to prepare for bicycling outdoors  4. Improve LEFS to 45% impairment      Patient goals: Genuflecting completely, riding a bicycle, hiking long distances      Pt. Education:  [x] Yes  [] No  [x] Reviewed Prior HEP/Ed  Method of Education: [x] Verbal  [x] Demo  [] Written  Comprehension of Education:  [x] Verbalizes understanding. [x] Demonstrates understanding. [x] Needs review. [] Demonstrates/verbalizes HEP/Ed previously given. 4/16/19 Has written TKA exercises pre set HEP:  mat, sitting and standing exercises. 5/3/19: fall prevention handout. 6/3/19: Wall squats, TKE against wall, Squats SL with UE reaching and other UE on wall, hip flexion in sitting and standing. Plan: [x] Continue per plan of care.    [] Other:      Time In:  3127  Time Out:  1412       Electronically signed by:  Chan Calloway PTA

## 2019-06-19 ENCOUNTER — HOSPITAL ENCOUNTER (OUTPATIENT)
Dept: PHYSICAL THERAPY | Age: 64
Setting detail: THERAPIES SERIES
Discharge: HOME | End: 2019-06-19
Payer: COMMERCIAL

## 2019-06-19 PROCEDURE — 97016 VASOPNEUMATIC DEVICE THERAPY: CPT

## 2019-06-19 PROCEDURE — 97110 THERAPEUTIC EXERCISES: CPT

## 2019-06-19 NOTE — FLOWSHEET NOTE
[x] [facility]) [facility] &  Therapy  [address]  [phone]  F: [phone] [] [address]  [place]   [address]  P: [phone]  F: [phone] [] [place]  [address]  P: [phone]  F: [phone] [] [address]  [facility]   [address]  [place]: [phone]  F: [phone]     Physical Therapy Daily Treatment Note     Date:  2019  Patient Name:  Aguilar Haley    :  1955  MRN: [id number]   Patient: Aguilar Haley                    : 1955                      MRN: [id number]  Physician: Toni Alcantar DO                       Insurance: [employer]  Medical Diagnosis: Total knee replacement status, right (M86.563 [ICD-10-CM])               Rehab Codes: M25.561, M25.661, M25.461, M62.81, R26.2  Onset date: 19                             Next Dr's appt.: TBD  Visit# / total visits:  ( add'l 12 approv. , 2x wk for total 30)    Cancels/No Shows: 0/0    Subjective:       Pain:  [x] Yes  [] No Location:   Pain Rating: (0-10 scale) 1/10 stiff R knee   Pain altered Tx:  [x] No  [] Yes  Action:  Comments:  Knee is stiff. Will be addressing bicycling this weekend if weather permits. Objective:    Modalities:  Vaso compression moderate compression 15 mins 34 degrees in supine with wedge post ex. Precautions:  Exercises:  Exercise Reps/ Time Weight/ Level Comments               cybex bike 5 mins  Manual  x   Treadmill  5 mins 1.3-1.5 mph Verbal cues for R knee flexion/toe off,              Standing jeremias           Hurdles  6x4ea 2 lbs Fw/lateral, added wt   CGA     gastro stretch 3x30\"  wedge    Hip flexion 15x 2.5 lbs Touching 12 in step, incr.  Height  x   Heel raises 20x 2.5 lbs      marching 10x2       HS curls 10x2 2 lbs  x 3 way hip tband 15x hvy jeremias LE,  x   Lateral  Step ups   15x 8 in /2.5 lbs Incr. Height 6/19 x   Step ups 15x/2.5 lbs 8  in   x   Step downs 1x10 6 in        Heel taps  15x 6 in   x   Step overs  15x 6 in  single UE support,step over  x   TKE tband 15x10\" hvy R LE CKC x   SL Western Ginny dead lift  6x2  retieving object from floor, finger on wall. Star exercise  5x5   R LE CKC, single UE support.  x   Wall slides 12x1 3 \" I      SLS 2x7-12 sec   touches wall often 6/3 ea LE     TG squats  L35 20x2    15x2  6/19 x   TG SL squats L28 15x2   10x2 6/19 x   Flight of  stairs 10x2  Reciprocal steps , one HR.  x          Sitting       HS stretch 3x20\"        LAQ 20x  2.5 lbs Ball vmo, Incr. x   HS curls  20x hvy    x   Hip flexion 10x2 2.5  lbs AROM 6/19 x   Hip abd 10x2 hvy      Supine       QS w/ op  10x5  Manual OP x   QS  10x5\"    added ball for vmo     Heel slides    10x10\"           x   SAQ w/ ball  15x  2.5 lbs ball for VMO  x   SLR 12x 2.5 lbs   x   SLR w/ ER  12x 2.5  lbs  x   Quad stretching off side of mat 3x30\"  belt x   butterflys 10x2 hvy      Prone        HS curls 20x 2.5  lbs         hip ext  10x2 2.5  lbs    x   Quad stretch 3x20\"         Prone hang 4 mins 3.5 lbs              Other:  Completed exercises marked with \"x\" Rotate exercises due to pt pace, allotted time and volume of exercises. Cx PT appts  wk of 6/24 due to on vacation. 6/17/19: R Knee supine 1-117 AAROM            Specific Instructions for next treatment: Progress eccentric quad control strength/exercises and balance. Progressed cane gait in to community all surfaces , flight of stairs.                Treatment Charges: Mins Units   []  Modalities     [x]  Ther Exercise  55 4   [x]  Manual Therapy     []  Ther Activities     []  Aquatics     [x]  Vasocompression 15 1   []  Other gait:      Total Treatment time 70  5        Assessment: [x] Progressing toward goals: Demonstrates reciprocal star climbing but hesitant with descending 1st two steps. NO LOB. Demonstrates improved quad endurance,  Intermittent quad lag with SLR as leg fatigues (SLR at end of session)          [] No change. [] Other:        STG: (to be met in 10 treatments)  1. ? Pain: Decrease right knee pain to 2/10--goal will likely need to be updated once pain pump is removed 5/8/19 MET  2. ? ROM: Increase right knee extension to 0°, flexion to 115°, 5/17/19 4-106° AAROM, Progress made  3. ? Strength: Pt will have a strong quad contraction with a quad set, pt will be able to perform a SLR without quad lag  5/8/19 MET ( does demonstrate weak distal quad weakness)  4. ? Function: Improve LEFS to 75% impairment 5/8/19 55% impariment, MET  5. Independent with Home Exercise Programs 5/3/19 MET   Demonstrate Knowledge of fall prevention 5/3/19 MET     LTG: (to be met in 18 treatments)  1. Pt will be able to walk at least 350 feet without an assistive device and without significant gait deviations 6/19/19 MET  2. Pt will be able to go up and down stairs with little difficulty 6/19/19 MET  3. Pt will be able to perform full revolutions on a stationary bike without difficulty in order to prepare for bicycling outdoors  4. Improve LEFS to 45% impairment      Patient goals: Genuflecting completely, riding a bicycle, hiking long distances      Pt. Education:  [x] Yes  [] No  [x] Reviewed Prior HEP/Ed  Method of Education: [x] Verbal  [x] Demo  [] Written  Comprehension of Education:  [x] Verbalizes understanding. [x] Demonstrates understanding. [x] Needs review. [] Demonstrates/verbalizes HEP/Ed previously given. 4/16/19 Has written TKA exercises pre set HEP:  mat, sitting and standing exercises. 5/3/19: fall prevention handout. 6/3/19: Wall squats, TKE against wall, Squats SL with UE reaching and other UE on wall, hip flexion in sitting and standing. Plan: [x] Continue per plan of care.    [x] Other:  On vacation wk of 6/24, will complete PT July 3 and switch to CP vs vaso wk of 7/1/19  Time In:  1248  Time Out:  1400        Electronically signed by:  Sondra Treviño PTA

## 2019-06-19 NOTE — FLOWSHEET NOTE
[x] [phone]  - [facility] TWELVE-STEP [facility] &  Therapy  [address]  [phone]  F: [phone] [] [address]  [place] [address]   [address]  P: [phone]  F: [phone] [] [place]  [address]  P: [phone]  F: [phone] [] [address]  [facility]   [address]  [place]: [phone]  F: [phone]     Physical Therapy Daily Treatment Note     Date:  2019  Patient Name:  Ulysses Haley    :  1955  MRN: [id number]   Patient: Ulysses Haley                    : 1955                      MRN: [id number]  Physician: Megan Hayden DO                       Insurance: [employer]  Medical Diagnosis: Total knee replacement status, right (N34.106 [ICD-10-CM])               Rehab Codes: M25.561, M25.661, M25.461, M62.81, R26.2  Onset date: 19                             Next Dr's appt.: TBD  Visit# / total visits:  ( add'l 12 approv. , 2x wk for total 30)    Cancels/No Shows: 0/0    Subjective:           Pain:  [x] Yes  [] No Location:   Pain Rating: (0-10 scale) 1-2/10 sore     Pain altered Tx:  [x] No  [] Yes  Action:  Comments:   Reports jeremias knees are sore today. Did some chores and stair climbing yesterday. Objective:    Modalities:  Vaso compression moderate compression 15 mins  34 degrees with wedge post ex.    Precautions:  Exercises:  Exercise Reps/ Time Weight/ Level Comments              cybex bike 5 mins  Manual, oscillations the first minute.   x   Treadmill  4mins 1.1 mph Verbal cues for R knee flexion/toe off,              Standing jeremias           Lateral weight shifts 1x10, 5\"  TKE focus; progressed to no UE support (walker in front of pt)     Forward /retro weight shifts 1x10, 5\"   no UE support, walker in front for if needed     Forward stepping 10x5\"  cane in LUE but progressed to no weight through LUE as becoming comfortable     Gait without AD 10 ft x 4  Along handrail for safety but no UE support,        Hurdles  6x6 ea  Fw/lateral,       gastro stretch 3x30\"  wedge     Heel raises 20x 2.5 lbs       marching 20x 2.5 lbs  x   HS curls 15x 2.5 lbs     3 way hip tband 15x hvy R LE CKC    x   Lateral  Step ups & overs 15x 6in          Step ups 20x 8  in   x   Step downs 15x  6 in  weak quads X  x      Heel taps  7x 6 in  fatigued x   TKE tband 15x10\" hvy     x   Star   20x  R LE CKC, L LE touches target     Squats 10x1  in walker with BUE support, cues to reduce weightshift to LLE, buttock touches mat     SL squat to table 5x2    in walker with BUE support; poor ability to isolate knee flexion (compensates with trunk or hip flexion to achieve squat)tactile cues     SLS 3x5-12 sec         TG squats  L35 10x2x5\" 5 min   x   TG SL squats L25 6x   fatigued x          Sitting       HS stretch 3x20\"         LAQ 15x  2.0 lbs Ball vmo,        HS curls  15x hvy        Hip flexion 10x2 2 lbs Restarted with wt 5/22 x   Supine       QS w/ op  10x5  Manual assist  x   Manual  10x5\"   6 mins   Knee extension overpressure by therapist d/t asymmetry noted in knee extension in supine  Manual: scar massage cross friction and patellar  Mobs,   Education for home scar massage x  x   QS  10x5\"    added ball for vmo        Heel slides    10x10\"      Sheet for self OP    x       SAQ w/ ball  15x  2lbs ball for VMO, AROM 5/17 due to medial knee pain. SLR 10x 2 lbs   x    SLR w/ ER  10x 2  lbs  x   Quad stretching off side of mat 3x30\"  Manual op x   Prone        HS curls 15x 2  lbs    x    hip ext  15x 2  lbs        Quad stretch 3x20\"       x            Other:  Completed exercises marked with \"x\"  5/17/19 R knee 2 to 115 in supine AAROM. Specific Instructions for next treatment:  ; Progress eccentric quad control strength/exercises and balance.   Progressed cane gait  in to community all surfaces. , add flight of stair as able. Treatment Charges: Mins Units   []  Modalities     [x]  Ther Exercise  50 3   [x]  Manual Therapy    6 1   []  Ther Activities     []  Aquatics     [x]  Vasocompression 15 1   [x]  Other gait:       Total Treatment time  71 5        Assessment: [] Progressing toward goals        [] No change. [x] Other:  Weak eccentric control with  Quads. Verbal cues for static hold with squats, quad control with step downs, and for tband 3 way hip/L LE (pacing)  CKC/R  LE stability. STG: (to be met in 10 treatments)  1. ? Pain: Decrease right knee pain to 2/10--goal will likely need to be updated once pain pump is removed 5/8/19 MET  2. ? ROM: Increase right knee extension to 0°, flexion to 115°, 5/17/19 4-106° AAROM, Progress made  3. ? Strength: Pt will have a strong quad contraction with a quad set, pt will be able to perform a SLR without quad lag  5/8/19 MET ( does demonstrate weak distal quad weakness)  4. ? Function: Improve LEFS to 75% impairment 5/8/19 55% impariment, MET  5. Independent with Home Exercise Programs 5/3/19 MET   Demonstrate Knowledge of fall prevention 5/3/19 MET     LTG: (to be met in 18 treatments)  1. Pt will be able to walk at least 350 feet without an assistive device and without significant gait deviations  2. Pt will be able to go up and down stairs with little difficulty  3. Pt will be able to perform full revolutions on a stationary bike without difficulty in order to prepare for bicycling outdoors  4. Improve LEFS to 45% impairment      Patient goals: Genuflecting completely, riding a bicycle, hiking long distances      Pt. Education:  [x] Yes  [] No  [x] Reviewed Prior HEP/Ed  Method of Education: [x] Verbal  [x] Demo  [] Written  Comprehension of Education:  [x] Verbalizes understanding. [x] Demonstrates understanding. [x] Needs review. [] Demonstrates/verbalizes HEP/Ed previously given.   5/3/19: fall prevention handout. Plan: [x] Continue per plan of care.    [] Other:      Time In: 4521        Time Out: 1400       Electronically signed by:  Jacek Zamarripa PTA independent

## 2019-06-24 ENCOUNTER — APPOINTMENT (OUTPATIENT)
Dept: PHYSICAL THERAPY | Age: 64
End: 2019-06-24
Payer: COMMERCIAL

## 2019-06-26 ENCOUNTER — APPOINTMENT (OUTPATIENT)
Dept: PHYSICAL THERAPY | Age: 64
End: 2019-06-26
Payer: COMMERCIAL

## 2019-07-01 ENCOUNTER — HOSPITAL ENCOUNTER (OUTPATIENT)
Dept: PHYSICAL THERAPY | Age: 64
Setting detail: THERAPIES SERIES
Discharge: HOME | End: 2019-07-01
Payer: COMMERCIAL

## 2019-07-01 PROCEDURE — 97110 THERAPEUTIC EXERCISES: CPT

## 2019-07-03 ENCOUNTER — HOSPITAL ENCOUNTER (OUTPATIENT)
Dept: PHYSICAL THERAPY | Age: 64
Setting detail: THERAPIES SERIES
Discharge: HOME | End: 2019-07-03
Payer: COMMERCIAL

## 2019-07-03 PROCEDURE — 97110 THERAPEUTIC EXERCISES: CPT

## 2019-07-03 NOTE — FLOWSHEET NOTE
exercise     R LE CKC, single UE support. Wall slides  3 \" I    SLS        TG squats   15x2  L35     TG SL squats  15x2  L30    Flight of  stairs   Reciprocal steps , one HR. cybex resistive walking 5x1 each 40 lbs Fwd, bwd   Sitting      HS stretch 3x20\"    Self overpressure on thigh to improve extension   LAQ 20x  2.5 lbs     HS curls  20x hvy      Hip flexion  2.5  lbs    Hip abd  hvy     Supine      QS w/ op    Manual OP   QS  10x5\"    with foot on bolster to improve extension   Heel slides    15x10\"             SAQ w/ ball  20  2.5 lbs  5 sec ball for VMO    SLR 12x 2.5 lbs     SLR w/ ER  12x 2.5  lbs    Quad stretching off side of mat 3x30\"  Belt-self stretches   butterflys  hvy     Prone       HS curls  2.5  lbs       hip ext   2.5  lbs      Quad stretch        Prone hang  3.5 lbs            Other:   Manual stretching to improve extension. Educated in techniques to improve extension and alternate positions to stretch gastroc without use of wedge. Specific Instructions for next treatment: Progress eccentric quad control strength/exercises and balance. Progressed cane gait in to community all surfaces , flight of stairs. Treatment Charges: Mins Units   [x]  Modalities CP 15 -   [x]  Ther Exercise 55 4   []  Manual Therapy     []  Ther Activities     []  Aquatics     []  Vasocompression      []  Other gait:      Total Treatment time  55 4        Assessment: [x] Progressing toward goals: pt demonstrates good recall of exercises for home and demonstrates understanding of new education given today         [] No change.      [] Other:        STG: (to be met in 10 treatments)  1. ? Pain: Decrease right knee pain to 2/10--goal will likely need to be updated once pain pump is removed 5/8/19 MET  2. ? ROM: Increase right knee extension to 0°, flexion to 115°, 5/17/19 4-106° AAROM, Progress made  3. ? Strength: Pt will have a strong quad contraction with a quad set, pt will be able to perform a

## 2019-07-08 ENCOUNTER — HOSPITAL ENCOUNTER (OUTPATIENT)
Dept: PHYSICAL THERAPY | Age: 64
Setting detail: THERAPIES SERIES
Discharge: HOME | End: 2019-07-08
Payer: COMMERCIAL

## 2019-07-08 PROCEDURE — 97110 THERAPEUTIC EXERCISES: CPT

## 2019-07-08 NOTE — FLOWSHEET NOTE
Progress made  3. ? Strength: Pt will have a strong quad contraction with a quad set, pt will be able to perform a SLR without quad lag  5/8/19 MET ( does demonstrate weak distal quad weakness)  4. ? Function: Improve LEFS to 75% impairment 5/8/19 55% impariment, MET  5. Independent with Home Exercise Programs 5/3/19 MET   Demonstrate Knowledge of fall prevention 5/3/19 MET     LTG: (to be met in 18 treatments)  1. Pt will be able to walk at least 350 feet without an assistive device and without significant gait deviations 6/19/19 MET  2. Pt will be able to go up and down stairs with little difficulty 6/19/19 MET  3. Pt will be able to perform full revolutions on a stationary bike without difficulty in order to prepare for bicycling outdoors  4. Improve LEFS to 45% impairment      Patient goals: Genuflecting completely, riding a bicycle, hiking long distances      Pt. Education:  [x] Yes  [] No  [] Reviewed Prior HEP/Ed  Method of Education: [x] Verbal  [x] Demo  [x] Written-Resistance band 4-way hip and TKE; stretches to improve knee extension, alternate positions for gastroc stretches  Comprehension of Education:  [x] Verbalizes understanding. [x] Demonstrates understanding. [] Needs review. [] Demonstrates/verbalizes HEP/Ed previously given. Plan: [x] Continue per plan of care.    [x] Other:   will complete PT July 10      Time In:  8111   Time Out:  1350       Electronically signed by:  Jaymie Valentin PTA

## 2019-07-10 ENCOUNTER — TELEPHONE (OUTPATIENT)
Dept: FAMILY MEDICINE CLINIC | Age: 64
End: 2019-07-10

## 2019-07-10 ENCOUNTER — HOSPITAL ENCOUNTER (OUTPATIENT)
Dept: PHYSICAL THERAPY | Age: 64
Setting detail: THERAPIES SERIES
Discharge: HOME | End: 2019-07-10
Payer: COMMERCIAL

## 2019-07-10 DIAGNOSIS — D72.829 LEUKOCYTOSIS, UNSPECIFIED TYPE: ICD-10-CM

## 2019-07-10 DIAGNOSIS — E11.9 TYPE 2 DIABETES MELLITUS WITHOUT COMPLICATION, WITHOUT LONG-TERM CURRENT USE OF INSULIN (HCC): ICD-10-CM

## 2019-07-10 DIAGNOSIS — O24.419 GESTATIONAL DIABETES MELLITUS (GDM), ANTEPARTUM, GESTATIONAL DIABETES METHOD OF CONTROL UNSPECIFIED: Primary | ICD-10-CM

## 2019-07-10 DIAGNOSIS — E78.5 DYSLIPIDEMIA: ICD-10-CM

## 2019-07-10 PROCEDURE — 97110 THERAPEUTIC EXERCISES: CPT

## 2019-07-10 NOTE — FLOWSHEET NOTE
wall.    TG squats   15x1  L35     TG SL squats  15x2  L30    Squats  15x1      Flight of  stairs   Reciprocal steps , one HR. cybex resistive walking   40 lbs Fwd, bwd CGA   Sitting      HS stretch      Self overpressure on thigh to improve extension   LAQ   2.5 lbs     HS curls  15x hvy      Hip flexion    2.5  lbs    Hip abd  hvy     Stool scoots     2.5 lbs     Supine      QS w/ op    Manual OP   QS      with foot on bolster to improve extension   Heel slides              SAQ w/ ball    2.5 lbs  5 sec ball for VMO    SLR 10x1 2.5 lbs     SLR w/ ER  10x1 2.5  lbs    Quad stretching off side of mat   Belt-self stretches   butterflys  hvy     Prone       HS curls  2.5  lbs       hip ext   2.5  lbs      Quad stretch        Prone hang  3.5 lbs            Other:    7/10/19:  R HS/quads  5/5, R hip flexion 4+/5  2-112 in supine AROM        Specific Instructions for next treatment:  Discharge. Treatment Charges: Mins Units   [x]  Modalities CP 15 -   [x]  Ther Exercise 45 3   []  Manual Therapy     []  Ther Activities     []  Aquatics     []  Vasocompression      []  Other gait:      Total Treatment time  45 3        Assessment: [x] Progressing toward goals: all LTG met. [] No change. [] Other:         STG: (to be met in 10 treatments)  1. ? Pain: Decrease right knee pain to 2/10--goal will likely need to be updated once pain pump is removed 5/8/19 MET  2. ? ROM: Increase right knee extension to 0°, flexion to 115°, 5/17/19 4-106° AAROM, Progress made  3. ? Strength: Pt will have a strong quad contraction with a quad set, pt will be able to perform a SLR without quad lag  5/8/19 MET ( does demonstrate weak distal quad weakness)  4. ? Function: Improve LEFS to 75% impairment 5/8/19 55% impariment, MET  5. Independent with Home Exercise Programs 5/3/19 MET   Demonstrate Knowledge of fall prevention 5/3/19 MET     LTG: (to be met in 18 treatments)  1.  Pt will be able to walk at least 350 feet without an

## 2019-07-11 ENCOUNTER — OFFICE VISIT (OUTPATIENT)
Dept: ORTHOPEDIC SURGERY | Age: 64
End: 2019-07-11
Payer: COMMERCIAL

## 2019-07-11 VITALS — WEIGHT: 175 LBS | BODY MASS INDEX: 29.16 KG/M2 | HEIGHT: 65 IN

## 2019-07-11 DIAGNOSIS — M17.11 ARTHRITIS OF RIGHT KNEE: Primary | ICD-10-CM

## 2019-07-11 DIAGNOSIS — Z96.651 S/P TOTAL KNEE ARTHROPLASTY, RIGHT: ICD-10-CM

## 2019-07-11 PROCEDURE — 99213 OFFICE O/P EST LOW 20 MIN: CPT | Performed by: ORTHOPAEDIC SURGERY

## 2019-07-11 ASSESSMENT — ENCOUNTER SYMPTOMS
CHEST TIGHTNESS: 0
ABDOMINAL PAIN: 0
COUGH: 0
APNEA: 0
VOMITING: 0

## 2019-07-11 NOTE — PROGRESS NOTES
without assistive device. Motor, sensory, vascular examination of the right lower extremity is grossly intact without focal deficits. Neuro: alert. oriented  Eyes: Extra-ocular muscles intact  Mouth: Oral mucosa moist. No perioral lesions  Pulm: Respirations unlabored and regular. Skin: warm, well perfused  Psych:   Patient has good fund of knowledge and displays understanging of exam, diagnosis, and plan. Assessment:      1. Arthritis of right knee    2. S/P total knee arthroplasty, right         Plan:      The patient should continue to do physical therapy on her own at home. The patient is okay to complete activities as tolerates. Discussed anti-biotic protocol with the patient and advised her to call the office for anti-biotic prior to dental procedures. The patient is should follow up in one year with repeat x-rays. The patient knows to call the office with any questions or concerns she may have. Follow up: Return in about 1 year (around 7/11/2020) for Xrays. No orders of the defined types were placed in this encounter. No orders of the defined types were placed in this encounter. Jesusita Khanna LPN am scribing for and in the presence of Dr. Chilango Acharya  7/14/2019 6:45 PM        I have reviewed and made changes accordingly to the work scribed by oRss Jorge LPN. The documentation accurately reflects work and decisions made by me. I have also reviewed documentation completed by clinical staff.     Chilango Acharya DO, [facility]  7/14/2019 6:46 PM    This note is created with the assistance of a speech recognition program.  While intending to generate a document that actually reflects the content of the visit, the document can still have some errors including those of syntax and sound a like substitutions which may escape proof reading.  In such instances, actual meaning can be extrapolated by contextual diversion      Electronically

## 2019-07-29 DIAGNOSIS — E11.9 TYPE 2 DIABETES MELLITUS WITHOUT COMPLICATION, WITHOUT LONG-TERM CURRENT USE OF INSULIN (HCC): ICD-10-CM

## 2019-07-30 RX ORDER — METFORMIN HYDROCHLORIDE 500 MG/1
TABLET, EXTENDED RELEASE ORAL
Qty: 90 TABLET | Refills: 5 | Status: SHIPPED | OUTPATIENT
Start: 2019-07-30 | End: 2020-02-07

## 2019-08-08 DIAGNOSIS — Z96.651 S/P TOTAL KNEE ARTHROPLASTY, RIGHT: ICD-10-CM

## 2019-08-08 DIAGNOSIS — M17.11 ARTHRITIS OF RIGHT KNEE: Primary | ICD-10-CM

## 2019-08-08 RX ORDER — AMOXICILLIN 500 MG/1
CAPSULE ORAL
Qty: 4 CAPSULE | Refills: 0 | Status: SHIPPED | OUTPATIENT
Start: 2019-08-08 | End: 2020-03-10 | Stop reason: ALTCHOICE

## 2019-08-29 ENCOUNTER — HOSPITAL ENCOUNTER (OUTPATIENT)
Age: 64
Setting detail: SPECIMEN
Discharge: HOME | End: 2019-08-29
Payer: COMMERCIAL

## 2019-08-29 LAB
ABSOLUTE EOS #: 0.13 K/UL (ref 0–0.44)
ABSOLUTE IMMATURE GRANULOCYTE: 0.03 K/UL (ref 0–0.3)
ABSOLUTE LYMPH #: 2.36 K/UL (ref 1.1–3.7)
ABSOLUTE MONO #: 0.55 K/UL (ref 0.1–1.2)
ALBUMIN SERPL-MCNC: 4.3 G/DL (ref 3.5–5.2)
ALBUMIN/GLOBULIN RATIO: 1.2 (ref 1–2.5)
ALP BLD-CCNC: 85 U/L (ref 35–104)
ALT SERPL-CCNC: 26 U/L (ref 5–33)
ANION GAP SERPL CALCULATED.3IONS-SCNC: 16 MMOL/L (ref 9–17)
AST SERPL-CCNC: 18 U/L
BASOPHILS # BLD: 1 % (ref 0–2)
BASOPHILS ABSOLUTE: 0.05 K/UL (ref 0–0.2)
BILIRUB SERPL-MCNC: 0.33 MG/DL (ref 0.3–1.2)
BUN BLDV-MCNC: 13 MG/DL (ref 8–23)
BUN/CREAT BLD: ABNORMAL (ref 9–20)
CALCIUM SERPL-MCNC: 9.7 MG/DL (ref 8.6–10.4)
CHLORIDE BLD-SCNC: 102 MMOL/L (ref 98–107)
CHOLESTEROL, FASTING: 175 MG/DL
CHOLESTEROL/HDL RATIO: 3.1
CO2: 25 MMOL/L (ref 20–31)
CREAT SERPL-MCNC: 0.65 MG/DL (ref 0.5–0.9)
DIFFERENTIAL TYPE: ABNORMAL
EOSINOPHILS RELATIVE PERCENT: 2 % (ref 1–4)
GFR AFRICAN AMERICAN: >60 ML/MIN
GFR NON-AFRICAN AMERICAN: >60 ML/MIN
GFR SERPL CREATININE-BSD FRML MDRD: ABNORMAL ML/MIN/{1.73_M2}
GFR SERPL CREATININE-BSD FRML MDRD: ABNORMAL ML/MIN/{1.73_M2}
GLUCOSE FASTING: 138 MG/DL (ref 70–99)
HCT VFR BLD CALC: 47.3 % (ref 36.3–47.1)
HDLC SERPL-MCNC: 56 MG/DL
HEMOGLOBIN: 14.5 G/DL (ref 11.9–15.1)
IMMATURE GRANULOCYTES: 0 %
LDL CHOLESTEROL: 95 MG/DL (ref 0–130)
LYMPHOCYTES # BLD: 31 % (ref 24–43)
MCH RBC QN AUTO: 27.2 PG (ref 25.2–33.5)
MCHC RBC AUTO-ENTMCNC: 30.7 G/DL (ref 28.4–34.8)
MCV RBC AUTO: 88.6 FL (ref 82.6–102.9)
MONOCYTES # BLD: 7 % (ref 3–12)
NRBC AUTOMATED: 0 PER 100 WBC
PDW BLD-RTO: 14.4 % (ref 11.8–14.4)
PLATELET # BLD: 381 K/UL (ref 138–453)
PLATELET ESTIMATE: ABNORMAL
PMV BLD AUTO: 12.5 FL (ref 8.1–13.5)
POTASSIUM SERPL-SCNC: 4.5 MMOL/L (ref 3.7–5.3)
RBC # BLD: 5.34 M/UL (ref 3.95–5.11)
RBC # BLD: ABNORMAL 10*6/UL
SEG NEUTROPHILS: 59 % (ref 36–65)
SEGMENTED NEUTROPHILS ABSOLUTE COUNT: 4.6 K/UL (ref 1.5–8.1)
SODIUM BLD-SCNC: 143 MMOL/L (ref 135–144)
TOTAL PROTEIN: 7.9 G/DL (ref 6.4–8.3)
TRIGLYCERIDE, FASTING: 120 MG/DL
VLDLC SERPL CALC-MCNC: NORMAL MG/DL (ref 1–30)
WBC # BLD: 7.7 K/UL (ref 3.5–11.3)
WBC # BLD: ABNORMAL 10*3/UL

## 2019-08-30 LAB
ESTIMATED AVERAGE GLUCOSE: 143 MG/DL
HBA1C MFR BLD: 6.6 % (ref 4–6)

## 2019-09-10 ENCOUNTER — OFFICE VISIT (OUTPATIENT)
Dept: FAMILY MEDICINE CLINIC | Age: 64
End: 2019-09-10
Payer: COMMERCIAL

## 2019-09-10 VITALS
OXYGEN SATURATION: 93 % | SYSTOLIC BLOOD PRESSURE: 108 MMHG | HEART RATE: 75 BPM | DIASTOLIC BLOOD PRESSURE: 64 MMHG | WEIGHT: 178 LBS | BODY MASS INDEX: 29.62 KG/M2

## 2019-09-10 DIAGNOSIS — Z23 IMMUNIZATION DUE: ICD-10-CM

## 2019-09-10 DIAGNOSIS — Z12.39 BREAST CANCER SCREENING: ICD-10-CM

## 2019-09-10 DIAGNOSIS — E11.9 TYPE 2 DIABETES MELLITUS WITHOUT COMPLICATION, WITHOUT LONG-TERM CURRENT USE OF INSULIN (HCC): Primary | ICD-10-CM

## 2019-09-10 DIAGNOSIS — E78.5 DYSLIPIDEMIA: ICD-10-CM

## 2019-09-10 PROCEDURE — 99213 OFFICE O/P EST LOW 20 MIN: CPT | Performed by: FAMILY MEDICINE

## 2019-09-10 PROCEDURE — 90471 IMMUNIZATION ADMIN: CPT | Performed by: FAMILY MEDICINE

## 2019-09-10 PROCEDURE — 90688 IIV4 VACCINE SPLT 0.5 ML IM: CPT | Performed by: FAMILY MEDICINE

## 2019-09-10 ASSESSMENT — ENCOUNTER SYMPTOMS
SINUS PRESSURE: 0
SORE THROAT: 0
BACK PAIN: 0
NAUSEA: 0
COUGH: 0
SHORTNESS OF BREATH: 0
VOMITING: 0
DIARRHEA: 0

## 2019-09-10 ASSESSMENT — PATIENT HEALTH QUESTIONNAIRE - PHQ9
2. FEELING DOWN, DEPRESSED OR HOPELESS: 0
SUM OF ALL RESPONSES TO PHQ QUESTIONS 1-9: 0
SUM OF ALL RESPONSES TO PHQ QUESTIONS 1-9: 0
SUM OF ALL RESPONSES TO PHQ9 QUESTIONS 1 & 2: 0
1. LITTLE INTEREST OR PLEASURE IN DOING THINGS: 0

## 2019-10-09 RX ORDER — ATORVASTATIN CALCIUM 20 MG/1
TABLET, FILM COATED ORAL
Qty: 30 TABLET | Refills: 11 | Status: SHIPPED | OUTPATIENT
Start: 2019-10-09 | End: 2020-10-19

## 2019-11-20 ENCOUNTER — HOSPITAL ENCOUNTER (OUTPATIENT)
Dept: MAMMOGRAPHY | Age: 64
Discharge: HOME | End: 2019-11-22
Payer: COMMERCIAL

## 2019-11-20 DIAGNOSIS — Z12.39 BREAST CANCER SCREENING: ICD-10-CM

## 2019-11-20 PROCEDURE — 77063 BREAST TOMOSYNTHESIS BI: CPT

## 2020-02-07 RX ORDER — METFORMIN HYDROCHLORIDE 500 MG/1
TABLET, EXTENDED RELEASE ORAL
Qty: 90 TABLET | Refills: 5 | Status: SHIPPED | OUTPATIENT
Start: 2020-02-07 | End: 2020-08-20 | Stop reason: SDUPTHER

## 2020-02-07 NOTE — TELEPHONE ENCOUNTER
Metabolic Panel Once 06/31/4514   Lipid Panel Once 03/02/2020   Hemoglobin A1C Once 03/02/2020               Patient Active Problem List:     Gestational diabetes     Dyslipidemia     Type 2 diabetes mellitus without complication, without long-term current use of insulin (HCC)     Total knee replacement status, right     Leukocytosis     Arthritis of right knee

## 2020-03-04 ENCOUNTER — HOSPITAL ENCOUNTER (OUTPATIENT)
Age: 65
Setting detail: SPECIMEN
Discharge: HOME | End: 2020-03-04
Payer: COMMERCIAL

## 2020-03-04 LAB
ABSOLUTE EOS #: 0.11 K/UL (ref 0–0.44)
ABSOLUTE IMMATURE GRANULOCYTE: 0.04 K/UL (ref 0–0.3)
ABSOLUTE LYMPH #: 2.36 K/UL (ref 1.1–3.7)
ABSOLUTE MONO #: 0.72 K/UL (ref 0.1–1.2)
ALBUMIN SERPL-MCNC: 4.2 G/DL (ref 3.5–5.2)
ALBUMIN/GLOBULIN RATIO: 1.1 (ref 1–2.5)
ALP BLD-CCNC: 121 U/L (ref 35–104)
ALT SERPL-CCNC: 48 U/L (ref 5–33)
ANION GAP SERPL CALCULATED.3IONS-SCNC: 17 MMOL/L (ref 9–17)
AST SERPL-CCNC: 25 U/L
BASOPHILS # BLD: 1 % (ref 0–2)
BASOPHILS ABSOLUTE: 0.06 K/UL (ref 0–0.2)
BILIRUB SERPL-MCNC: 0.41 MG/DL (ref 0.3–1.2)
BUN BLDV-MCNC: 14 MG/DL (ref 8–23)
BUN/CREAT BLD: ABNORMAL (ref 9–20)
CALCIUM SERPL-MCNC: 9.7 MG/DL (ref 8.6–10.4)
CHLORIDE BLD-SCNC: 103 MMOL/L (ref 98–107)
CHOLESTEROL/HDL RATIO: 3.5
CHOLESTEROL: 166 MG/DL
CO2: 20 MMOL/L (ref 20–31)
CREAT SERPL-MCNC: 0.64 MG/DL (ref 0.5–0.9)
DIFFERENTIAL TYPE: ABNORMAL
EOSINOPHILS RELATIVE PERCENT: 1 % (ref 1–4)
GFR AFRICAN AMERICAN: >60 ML/MIN
GFR NON-AFRICAN AMERICAN: >60 ML/MIN
GFR SERPL CREATININE-BSD FRML MDRD: ABNORMAL ML/MIN/{1.73_M2}
GFR SERPL CREATININE-BSD FRML MDRD: ABNORMAL ML/MIN/{1.73_M2}
GLUCOSE BLD-MCNC: 147 MG/DL (ref 70–99)
HCT VFR BLD CALC: 48.9 % (ref 36.3–47.1)
HDLC SERPL-MCNC: 47 MG/DL
HEMOGLOBIN: 15.2 G/DL (ref 11.9–15.1)
IMMATURE GRANULOCYTES: 0 %
LDL CHOLESTEROL: 91 MG/DL (ref 0–130)
LYMPHOCYTES # BLD: 23 % (ref 24–43)
MCH RBC QN AUTO: 27.8 PG (ref 25.2–33.5)
MCHC RBC AUTO-ENTMCNC: 31.1 G/DL (ref 28.4–34.8)
MCV RBC AUTO: 89.4 FL (ref 82.6–102.9)
MONOCYTES # BLD: 7 % (ref 3–12)
NRBC AUTOMATED: 0 PER 100 WBC
PDW BLD-RTO: 13.6 % (ref 11.8–14.4)
PLATELET # BLD: 408 K/UL (ref 138–453)
PLATELET ESTIMATE: ABNORMAL
PMV BLD AUTO: 12.3 FL (ref 8.1–13.5)
POTASSIUM SERPL-SCNC: 4.1 MMOL/L (ref 3.7–5.3)
RBC # BLD: 5.47 M/UL (ref 3.95–5.11)
RBC # BLD: ABNORMAL 10*6/UL
SEG NEUTROPHILS: 68 % (ref 36–65)
SEGMENTED NEUTROPHILS ABSOLUTE COUNT: 7.2 K/UL (ref 1.5–8.1)
SODIUM BLD-SCNC: 140 MMOL/L (ref 135–144)
TOTAL PROTEIN: 8.2 G/DL (ref 6.4–8.3)
TRIGL SERPL-MCNC: 141 MG/DL
VLDLC SERPL CALC-MCNC: NORMAL MG/DL (ref 1–30)
WBC # BLD: 10.5 K/UL (ref 3.5–11.3)
WBC # BLD: ABNORMAL 10*3/UL

## 2020-03-05 LAB
ESTIMATED AVERAGE GLUCOSE: 146 MG/DL
HBA1C MFR BLD: 6.7 % (ref 4–6)

## 2020-03-10 ENCOUNTER — OFFICE VISIT (OUTPATIENT)
Dept: FAMILY MEDICINE CLINIC | Age: 65
End: 2020-03-10
Payer: COMMERCIAL

## 2020-03-10 VITALS
WEIGHT: 183.6 LBS | DIASTOLIC BLOOD PRESSURE: 76 MMHG | HEART RATE: 79 BPM | BODY MASS INDEX: 30.59 KG/M2 | SYSTOLIC BLOOD PRESSURE: 110 MMHG | HEIGHT: 65 IN | OXYGEN SATURATION: 94 %

## 2020-03-10 PROCEDURE — 99213 OFFICE O/P EST LOW 20 MIN: CPT | Performed by: FAMILY MEDICINE

## 2020-03-10 ASSESSMENT — PATIENT HEALTH QUESTIONNAIRE - PHQ9
SUM OF ALL RESPONSES TO PHQ QUESTIONS 1-9: 0
SUM OF ALL RESPONSES TO PHQ9 QUESTIONS 1 & 2: 0
1. LITTLE INTEREST OR PLEASURE IN DOING THINGS: 0
2. FEELING DOWN, DEPRESSED OR HOPELESS: 0
SUM OF ALL RESPONSES TO PHQ QUESTIONS 1-9: 0

## 2020-03-10 ASSESSMENT — ENCOUNTER SYMPTOMS
NAUSEA: 0
COUGH: 0
DIARRHEA: 0
VOMITING: 0
SORE THROAT: 0
SINUS PRESSURE: 0
BACK PAIN: 0
SHORTNESS OF BREATH: 0

## 2020-03-10 NOTE — PROGRESS NOTES
Annika Haley is a 62 y.o. female who presents todayfor her medical conditions/complaints as noted below. Annika Haley is here today c/oDiabetes      :     HPI    Routine check on PL she is feeling well and recent labs were reviewed and appear stable. Past Medical History:   Diagnosis Date    Arthritis     BILATERAL KNEES    Gestational diabetes     FIRST PREGNANCY ONLY     Hyperlipidemia     Type 2 diabetes mellitus ([address] 06/2018    Wears glasses       Past Surgical History:   Procedure Laterality Date    REPLACEMENT TOTAL KNEE Right 4/9/2019    NAVIO TOTAL KNEE, ZACARIAS AND NEPHEW *ADVANCED RIGHT [facility]     Family History   Problem Relation Age of Onset    Cancer Mother         pancreas    Heart Disease Mother         tachycardia; ? diagnosis    High Blood Pressure Mother     Cancer Father         thyroid, COLON    Diabetes Father     Cancer Maternal Uncle         pancreas; bladder;     Heart Disease Maternal Uncle         hearth failure, ?cause    Cancer Paternal Uncle         lung in smoker    Cancer Paternal Grandfather         esophageal     Social History     Tobacco Use    Smoking status: Never Smoker    Smokeless tobacco: Never Used   Substance Use Topics    Alcohol use:  Yes     Alcohol/week: 2.0 standard drinks     Types: 2 Glasses of wine per week     Comment: ONCE A WEEK      Current Outpatient Medications   Medication Sig Dispense Refill    metFORMIN (GLUCOPHAGE-XR) 500 MG extended release tablet take 2 tablets by mouth every morning with food THEN 1 TABLET IN THE EVENING WITH FOOD 90 tablet 5    metFORMIN (GLUCOPHAGE) 500 MG tablet take 1 tablet by mouth twice a day 60 tablet 3    atorvastatin (LIPITOR) 20 MG tablet take 1 tablet by mouth once daily 30 tablet 11    SITagliptin (JANUVIA) 100 MG tablet Take 1 tablet by mouth daily 30 tablet 11    docusate sodium (COLACE) 100 MG

## 2020-08-20 RX ORDER — METFORMIN HYDROCHLORIDE 500 MG/1
TABLET, EXTENDED RELEASE ORAL
Qty: 90 TABLET | Refills: 1 | Status: SHIPPED | OUTPATIENT
Start: 2020-08-20 | End: 2020-10-19

## 2020-08-20 RX ORDER — METFORMIN HYDROCHLORIDE 500 MG/1
TABLET, EXTENDED RELEASE ORAL
Qty: 90 TABLET | Refills: 5 | OUTPATIENT
Start: 2020-08-20

## 2020-08-20 NOTE — TELEPHONE ENCOUNTER
Patient takes Metformin- mg.    Takes 1000 mg. in am.  Takes   500 mg in pm    90 days  [facility] on [address]

## 2020-09-04 ENCOUNTER — HOSPITAL ENCOUNTER (OUTPATIENT)
Age: 65
Setting detail: SPECIMEN
Discharge: HOME | End: 2020-09-04
Payer: COMMERCIAL

## 2020-09-04 LAB
ABSOLUTE EOS #: 0.16 K/UL (ref 0–0.44)
ABSOLUTE IMMATURE GRANULOCYTE: <0.03 K/UL (ref 0–0.3)
ABSOLUTE LYMPH #: 2.76 K/UL (ref 1.1–3.7)
ABSOLUTE MONO #: 0.6 K/UL (ref 0.1–1.2)
ALBUMIN SERPL-MCNC: 4.1 G/DL (ref 3.5–5.2)
ALBUMIN/GLOBULIN RATIO: 1.3 (ref 1–2.5)
ALP BLD-CCNC: 85 U/L (ref 35–104)
ALT SERPL-CCNC: 30 U/L (ref 5–33)
ANION GAP SERPL CALCULATED.3IONS-SCNC: 12 MMOL/L (ref 9–17)
AST SERPL-CCNC: 20 U/L
BASOPHILS # BLD: 1 % (ref 0–2)
BASOPHILS ABSOLUTE: 0.05 K/UL (ref 0–0.2)
BILIRUB SERPL-MCNC: 0.49 MG/DL (ref 0.3–1.2)
BUN BLDV-MCNC: 15 MG/DL (ref 8–23)
BUN/CREAT BLD: ABNORMAL (ref 9–20)
CALCIUM SERPL-MCNC: 9.4 MG/DL (ref 8.6–10.4)
CHLORIDE BLD-SCNC: 105 MMOL/L (ref 98–107)
CHOLESTEROL/HDL RATIO: 4.2
CHOLESTEROL: 213 MG/DL
CO2: 24 MMOL/L (ref 20–31)
CREAT SERPL-MCNC: 0.66 MG/DL (ref 0.5–0.9)
DIFFERENTIAL TYPE: NORMAL
EOSINOPHILS RELATIVE PERCENT: 2 % (ref 1–4)
ESTIMATED AVERAGE GLUCOSE: 154 MG/DL
GFR AFRICAN AMERICAN: >60 ML/MIN
GFR NON-AFRICAN AMERICAN: >60 ML/MIN
GFR SERPL CREATININE-BSD FRML MDRD: ABNORMAL ML/MIN/{1.73_M2}
GFR SERPL CREATININE-BSD FRML MDRD: ABNORMAL ML/MIN/{1.73_M2}
GLUCOSE BLD-MCNC: 127 MG/DL (ref 70–99)
HBA1C MFR BLD: 7 % (ref 4–6)
HCT VFR BLD CALC: 45.5 % (ref 36.3–47.1)
HDLC SERPL-MCNC: 51 MG/DL
HEMOGLOBIN: 14.3 G/DL (ref 11.9–15.1)
IMMATURE GRANULOCYTES: 0 %
LDL CHOLESTEROL: 120 MG/DL (ref 0–130)
LYMPHOCYTES # BLD: 37 % (ref 24–43)
MCH RBC QN AUTO: 28.1 PG (ref 25.2–33.5)
MCHC RBC AUTO-ENTMCNC: 31.4 G/DL (ref 28.4–34.8)
MCV RBC AUTO: 89.4 FL (ref 82.6–102.9)
MONOCYTES # BLD: 8 % (ref 3–12)
NRBC AUTOMATED: 0 PER 100 WBC
PDW BLD-RTO: 13.4 % (ref 11.8–14.4)
PLATELET # BLD: 315 K/UL (ref 138–453)
PLATELET ESTIMATE: NORMAL
PMV BLD AUTO: 12.5 FL (ref 8.1–13.5)
POTASSIUM SERPL-SCNC: 4.2 MMOL/L (ref 3.7–5.3)
RBC # BLD: 5.09 M/UL (ref 3.95–5.11)
RBC # BLD: NORMAL 10*6/UL
SEG NEUTROPHILS: 52 % (ref 36–65)
SEGMENTED NEUTROPHILS ABSOLUTE COUNT: 3.98 K/UL (ref 1.5–8.1)
SODIUM BLD-SCNC: 141 MMOL/L (ref 135–144)
TOTAL PROTEIN: 7.2 G/DL (ref 6.4–8.3)
TRIGL SERPL-MCNC: 210 MG/DL
VLDLC SERPL CALC-MCNC: ABNORMAL MG/DL (ref 1–30)
WBC # BLD: 7.6 K/UL (ref 3.5–11.3)
WBC # BLD: NORMAL 10*3/UL

## 2020-09-15 ENCOUNTER — OFFICE VISIT (OUTPATIENT)
Dept: FAMILY MEDICINE CLINIC | Age: 65
End: 2020-09-15
Payer: COMMERCIAL

## 2020-09-15 VITALS
OXYGEN SATURATION: 97 % | BODY MASS INDEX: 30.66 KG/M2 | TEMPERATURE: 97.3 F | WEIGHT: 184 LBS | HEART RATE: 86 BPM | SYSTOLIC BLOOD PRESSURE: 116 MMHG | HEIGHT: 65 IN | DIASTOLIC BLOOD PRESSURE: 80 MMHG

## 2020-09-15 PROCEDURE — 90686 IIV4 VACC NO PRSV 0.5 ML IM: CPT | Performed by: FAMILY MEDICINE

## 2020-09-15 PROCEDURE — 90471 IMMUNIZATION ADMIN: CPT | Performed by: FAMILY MEDICINE

## 2020-09-15 PROCEDURE — 3051F HG A1C>EQUAL 7.0%<8.0%: CPT | Performed by: FAMILY MEDICINE

## 2020-09-15 PROCEDURE — 99213 OFFICE O/P EST LOW 20 MIN: CPT | Performed by: FAMILY MEDICINE

## 2020-09-15 SDOH — ECONOMIC STABILITY: TRANSPORTATION INSECURITY
IN THE PAST 12 MONTHS, HAS THE LACK OF TRANSPORTATION KEPT YOU FROM MEDICAL APPOINTMENTS OR FROM GETTING MEDICATIONS?: NO

## 2020-09-15 SDOH — ECONOMIC STABILITY: INCOME INSECURITY: HOW HARD IS IT FOR YOU TO PAY FOR THE VERY BASICS LIKE FOOD, HOUSING, MEDICAL CARE, AND HEATING?: NOT HARD AT ALL

## 2020-09-15 SDOH — ECONOMIC STABILITY: TRANSPORTATION INSECURITY
IN THE PAST 12 MONTHS, HAS LACK OF TRANSPORTATION KEPT YOU FROM MEETINGS, WORK, OR FROM GETTING THINGS NEEDED FOR DAILY LIVING?: NO

## 2020-09-15 SDOH — ECONOMIC STABILITY: FOOD INSECURITY: WITHIN THE PAST 12 MONTHS, THE FOOD YOU BOUGHT JUST DIDN'T LAST AND YOU DIDN'T HAVE MONEY TO GET MORE.: NEVER TRUE

## 2020-09-15 SDOH — ECONOMIC STABILITY: FOOD INSECURITY: WITHIN THE PAST 12 MONTHS, YOU WORRIED THAT YOUR FOOD WOULD RUN OUT BEFORE YOU GOT MONEY TO BUY MORE.: NEVER TRUE

## 2020-09-15 ASSESSMENT — ENCOUNTER SYMPTOMS
SINUS PRESSURE: 0
SHORTNESS OF BREATH: 0
NAUSEA: 0
SORE THROAT: 0
BACK PAIN: 0
VOMITING: 0
COUGH: 0
DIARRHEA: 0

## 2020-09-15 NOTE — PROGRESS NOTES
Rick Haley is a 62 y.o. female who presents todayfor her medical conditions/complaints as noted below. Rick Haley is here today c/o6 Month Follow-Up      :     HPI    Routine follow up on PL she is feeling well currently. Past Medical History:   Diagnosis Date    Arthritis     BILATERAL KNEES    Gestational diabetes     FIRST PREGNANCY ONLY     Hyperlipidemia     Type 2 diabetes mellitus ([address] 06/2018    Wears glasses       Past Surgical History:   Procedure Laterality Date    REPLACEMENT TOTAL KNEE Right 4/9/2019    NAVIO TOTAL KNEE, ZACARIAS AND NEPHEW *ADVANCED RIGHT performed by Livia Nguyen DO at [facility] Right 04/09/2019     Family History   Problem Relation Age of Onset    Cancer Mother         pancreas    Heart Disease Mother         tachycardia; ? diagnosis    High Blood Pressure Mother     Cancer Father         thyroid, COLON    Diabetes Father     Cancer Maternal Uncle         pancreas; bladder;     Heart Disease Maternal Uncle         hearth failure, ?cause    Cancer Paternal Uncle         lung in smoker    Cancer Paternal Grandfather         esophageal     Social History     Tobacco Use    Smoking status: Never Smoker    Smokeless tobacco: Never Used   Substance Use Topics    Alcohol use:  Yes     Alcohol/week: 2.0 standard drinks     Types: 2 Glasses of wine per week     Comment: ONCE A WEEK      Current Outpatient Medications   Medication Sig Dispense Refill    metFORMIN (GLUCOPHAGE-XR) 500 MG extended release tablet take 2 tablets by mouth every morning with food THEN 1 TABLET IN THE EVENING WITH FOOD 90 tablet 1    atorvastatin (LIPITOR) 20 MG tablet take 1 tablet by mouth once daily 30 tablet 11    SITagliptin (JANUVIA) 100 MG tablet Take 1 tablet by mouth daily 30 tablet 11    docusate sodium (COLACE) 100 MG capsule Take 1 capsule by mouth 2 times daily as needed for Constipation 60 capsule 0    aspirin EC 81 MG EC tablet Take 1 tablet by mouth 2 times daily 84 tablet 0     No current facility-administered medications for this visit. No Known Allergies      Subjective:   Review of Systems   Constitutional: Negative for chills, diaphoresis and fever. HENT: Negative for congestion, sinus pressure and sore throat. Eyes: Negative for visual disturbance. Respiratory: Negative for cough and shortness of breath. Cardiovascular: Negative for chest pain and leg swelling. Gastrointestinal: Negative for diarrhea, nausea and vomiting. Genitourinary: Negative for dysuria, menstrual problem, urgency and vaginal discharge. Musculoskeletal: Negative for arthralgias, back pain and myalgias. Skin: Negative for rash. Neurological: Negative for weakness, numbness and headaches. Psychiatric/Behavioral: Negative for sleep disturbance.       :   /80   Pulse 86   Temp 97.3 °F (36.3 °C)   Ht 5' 5\" (1.651 m)   Wt 184 lb (83.5 kg)   SpO2 97%   BMI 30.62 kg/m²     Physical Exam  Constitutional:       General: She is not in acute distress. Appearance: She is well-developed. She is not diaphoretic. HENT:      Head: Normocephalic and atraumatic. Mouth/Throat:      Pharynx: No oropharyngeal exudate. Eyes:      General: No scleral icterus. Neck:      Musculoskeletal: Neck supple. Vascular: No carotid bruit. Cardiovascular:      Rate and Rhythm: Normal rate. Heart sounds: No murmur. No friction rub. No gallop. Pulmonary:      Effort: No respiratory distress. Breath sounds: No wheezing or rales. Chest:      Chest wall: No tenderness. Lymphadenopathy:      Cervical: No cervical adenopathy. Skin:     Findings: No rash. Neurological:      Mental Status: She is alert and oriented to person, place, and time. Cranial Nerves: No cranial nerve deficit. Psychiatric:         Behavior: Behavior normal.         Thought Content:  Thought content normal.         Judgment: Judgment normal. Assessment:       Diagnosis Orders   1. Breast cancer screening  YING DIGITAL SCREEN W OR WO CAD BILATERAL   2. Dyslipidemia  CBC Auto Differential    Comprehensive Metabolic Panel    Lipid Panel    Hemoglobin A1C   3. Type 2 diabetes mellitus without complication, without long-term current use of insulin (HCC)  CBC Auto Differential    Comprehensive Metabolic Panel    Lipid Panel         Plan:      Return in about 6 months (around 3/15/2021). Orders Placed This Encounter   Procedures    YING DIGITAL SCREEN W OR WO CAD BILATERAL     Standing Status:   Future     Standing Expiration Date:   11/15/2021    INFLUENZA, QUADV, 3 YRS AND OLDER, IM PF, PREFILL SYR OR SDV, 0.5ML (AFLURIA QUADV, PF)    CBC Auto Differential     Standing Status:   Future     Standing Expiration Date:   9/15/2021    Comprehensive Metabolic Panel     Standing Status:   Future     Standing Expiration Date:   9/15/2021    Lipid Panel     Standing Status:   Future     Standing Expiration Date:   9/15/2021     Order Specific Question:   Is Patient Fasting?/# of Hours     Answer:   12 hour fast    Hemoglobin A1C     Standing Status:   Future     Standing Expiration Date:   9/15/2021     No orders of the defined types were placed in this encounter.   Labs in six months and will see Dr Yusef Tejeda post.

## 2020-09-23 RX ORDER — SITAGLIPTIN 100 MG/1
TABLET, FILM COATED ORAL
Qty: 30 TABLET | Refills: 11 | Status: SHIPPED | OUTPATIENT
Start: 2020-09-23 | End: 2021-01-04 | Stop reason: SDUPTHER

## 2020-09-23 NOTE — TELEPHONE ENCOUNTER
Ethan Hlaey is calling to request a refill on the following medication(s):    Medication Request:  Requested Prescriptions     Pending Prescriptions Disp Refills    JANUVIA 100 MG tablet [Pharmacy Med Name: Danella Amy 100 MG TABLET] 30 tablet 11     Sig: take 1 tablet by mouth once daily       Last Visit Date (If Applicable):  5/48/3341    Next Visit Date:    Visit date not found

## 2020-09-25 ENCOUNTER — OFFICE VISIT (OUTPATIENT)
Dept: ORTHOPEDIC SURGERY | Age: 65
End: 2020-09-25
Payer: COMMERCIAL

## 2020-09-25 VITALS — WEIGHT: 184 LBS | HEIGHT: 65 IN | BODY MASS INDEX: 30.66 KG/M2

## 2020-09-25 PROCEDURE — 99213 OFFICE O/P EST LOW 20 MIN: CPT | Performed by: ORTHOPAEDIC SURGERY

## 2020-09-25 ASSESSMENT — ENCOUNTER SYMPTOMS
COUGH: 0
DIARRHEA: 0
NAUSEA: 0
CONSTIPATION: 0

## 2020-09-25 NOTE — PROGRESS NOTES
[facility]  [address] [address]  Dept: [phone]  Dept Fax: [phone]        Ambulatory Follow Up      Subjective:   Nadir Haley is a 62y.o. year old female who presents to our office today for routine followup regarding her   1. Arthritis of right knee    2. S/P total knee arthroplasty, right    . Chief Complaint   Patient presents with    Knee Pain     right knee pain norma today       HPI-Patient presents to office today for her surveillance x-rays after undergoing a right total knee arthroplasty on 4/9/2019. Patient is doing really well. She is very happy she got her right knee replaced. She is able to do all the activities she likes. Patient does mention she has some morning stiffness but gets better as the days goes on. Review of Systems   Constitutional: Negative for chills and fever. Respiratory: Negative for cough. Gastrointestinal: Negative for constipation, diarrhea and nausea. Musculoskeletal: Negative for arthralgias, gait problem, joint swelling and myalgias. Neurological: Negative for dizziness, weakness and numbness. I have reviewed the CC, HPI, ROS, PMH, FHX, Social History, and if not present in this note, I have reviewed in the patient's chart. I agree with the documentation provided by other staff and have reviewed their documentation prior to providing my signature indicating agreement. Objective :   Ht 5' 5\" (1.651 m)   Wt 184 lb (83.5 kg)   BMI 30.62 kg/m²  Body mass index is 30.62 kg/m². General: Nadir Haley is a 62 y.o. female who is alert and oriented and sitting comfortably in our office. Ortho Exam  MS:  Right knee full range of motion. Patient walks without antalgia. Anterior incisional scar is well-healed without any signs of infection. Motor, sensory, vascular examination to the right lower extremity is grossly intact without focal deficits.   Neuro: alert and oriented to person and place.   Eyes: Extra-ocular muscles intact  Mouth: Oral mucosa moist. No perioral lesions  Pulm: Respirations unlabored and regular. Symmetric chest excursion without outward deformity is noted. Skin: warm, well perfused  Psych:   Patient has good fund of knowledge and displays understanging of exam, diagnosis, and plan. Radiology:     Xr Knee Right (3 Views)    Result Date: 9/26/2020  History:  Right  total knee arthroplasty Findings: AP, lateral, merchant view x-rays of the Right  knee done in the office standing today shows Right total knee arthroplasty in good position without  complications. No loosening of components is appreciated. No evidence of fracture, subluxation, dislocation, radiopaque foreign body, radiopaque tumors noted. Alignment is near-anatomic. Impression: Status post Right total knee arthroplasty as described above. Assessment:      1. Arthritis of right knee    2. S/P total knee arthroplasty, right       Plan:      Went over radiographs with patient. Discussed with patient that everything looks stable. Patient to continue her home exercise program. Follow up in 2 years for surveillance x-rays. Follow up:Return in about 2 years (around 9/25/2022) for x rays. No orders of the defined types were placed in this encounter. Orders Placed This Encounter   Procedures    XR KNEE RIGHT (3 VIEWS)     I, Tona Hoskins RN am scribing for and in the presence of Dr. Edgar Bell  9/25/2020 3:18 PM      I have reviewed and made changes accordingly to the work scribed by Tona Hoskins RN. The documentation accurately reflects work and decisions made by me. I have also reviewed documentation completed by clinical staff.     Edgar Bell DO, [facility]  9/27/2020 2:32 PM    This note is created with the assistance of a speech recognition program.  While intending to generate a document that actually reflects the content of the visit, the document can still have some errors including those of syntax and sound a like substitutions which may escape proof reading.  In such instances, actual meaning can be extrapolated by contextual diversion      Electronically signed by Kei Finch RN, Della Batten on 9/25/2020 at 3:18 PM

## 2020-10-19 RX ORDER — ATORVASTATIN CALCIUM 20 MG/1
TABLET, FILM COATED ORAL
Qty: 30 TABLET | Refills: 11 | Status: SHIPPED | OUTPATIENT
Start: 2020-10-19 | End: 2021-01-04 | Stop reason: SDUPTHER

## 2020-10-19 RX ORDER — METFORMIN HYDROCHLORIDE 500 MG/1
TABLET, EXTENDED RELEASE ORAL
Qty: 90 TABLET | Refills: 1 | Status: SHIPPED | OUTPATIENT
Start: 2020-10-19 | End: 2021-01-04 | Stop reason: SDUPTHER

## 2021-01-04 ENCOUNTER — TELEPHONE (OUTPATIENT)
Dept: FAMILY MEDICINE CLINIC | Age: 66
End: 2021-01-04

## 2021-01-04 DIAGNOSIS — E11.9 TYPE 2 DIABETES MELLITUS WITHOUT COMPLICATION, WITHOUT LONG-TERM CURRENT USE OF INSULIN (HCC): ICD-10-CM

## 2021-01-04 RX ORDER — METFORMIN HYDROCHLORIDE 500 MG/1
TABLET, EXTENDED RELEASE ORAL
Qty: 270 TABLET | Refills: 1 | Status: SHIPPED | OUTPATIENT
Start: 2021-01-04 | End: 2021-06-15

## 2021-01-04 RX ORDER — ATORVASTATIN CALCIUM 20 MG/1
TABLET, FILM COATED ORAL
Qty: 90 TABLET | Refills: 1 | Status: SHIPPED | OUTPATIENT
Start: 2021-01-04 | End: 2021-03-15

## 2021-01-04 NOTE — TELEPHONE ENCOUNTER
Robert Hlaey is calling to request a refill on the following medication(s):    Medication Request:  Requested Prescriptions     Pending Prescriptions Disp Refills    metFORMIN (GLUCOPHAGE-XR) 500 MG extended release tablet 270 tablet 1     Sig: TAKE 2 TABLETS BY MOUTH IN THE MORNING AND 1 TAB AT BEDTIME    SITagliptin (JANUVIA) 100 MG tablet 90 tablet 1     Sig: take 1 tablet by mouth once daily    atorvastatin (LIPITOR) 20 MG tablet 90 tablet 1     Sig: take 1 tablet by mouth once daily       Last Visit Date (If Applicable):  Visit date not found    Next Visit Date:    3/15/2021

## 2021-01-05 NOTE — TELEPHONE ENCOUNTER
Ryan Haley is calling to request a refill on the following medication(s):    SAMPLES    Medication Request:  Requested Prescriptions     Pending Prescriptions Disp Refills    SITagliptin (JANUVIA) 100 MG tablet 90 tablet 1     Sig: take 1 tablet by mouth once daily       Last Visit Date (If Applicable):  3/19/2426    Next Visit Date:    Visit date not found

## 2021-02-24 ENCOUNTER — TELEPHONE (OUTPATIENT)
Dept: FAMILY MEDICINE CLINIC | Age: 66
End: 2021-02-24

## 2021-02-24 NOTE — TELEPHONE ENCOUNTER
Pt needs a new mammogram order because the old one is in [facility] name and has the wrong diagnosis code.

## 2021-02-26 DIAGNOSIS — Z12.31 ENCOUNTER FOR SCREENING MAMMOGRAM FOR MALIGNANT NEOPLASM OF BREAST: Primary | ICD-10-CM

## 2021-03-05 ENCOUNTER — HOSPITAL ENCOUNTER (OUTPATIENT)
Age: 66
Setting detail: SPECIMEN
Discharge: HOME | End: 2021-03-05
Payer: COMMERCIAL

## 2021-03-05 DIAGNOSIS — E11.9 TYPE 2 DIABETES MELLITUS WITHOUT COMPLICATION, WITHOUT LONG-TERM CURRENT USE OF INSULIN (HCC): ICD-10-CM

## 2021-03-05 DIAGNOSIS — E78.5 DYSLIPIDEMIA: ICD-10-CM

## 2021-03-05 LAB
ABSOLUTE EOS #: 0.14 K/UL (ref 0–0.44)
ABSOLUTE IMMATURE GRANULOCYTE: 0.03 K/UL (ref 0–0.3)
ABSOLUTE LYMPH #: 2.52 K/UL (ref 1.1–3.7)
ABSOLUTE MONO #: 0.57 K/UL (ref 0.1–1.2)
ALBUMIN SERPL-MCNC: 4.2 G/DL (ref 3.5–5.2)
ALBUMIN/GLOBULIN RATIO: 1.3 (ref 1–2.5)
ALP BLD-CCNC: 87 U/L (ref 35–104)
ALT SERPL-CCNC: 44 U/L (ref 5–33)
ANION GAP SERPL CALCULATED.3IONS-SCNC: 13 MMOL/L (ref 9–17)
AST SERPL-CCNC: 27 U/L
BASOPHILS # BLD: 1 % (ref 0–2)
BASOPHILS ABSOLUTE: 0.04 K/UL (ref 0–0.2)
BILIRUB SERPL-MCNC: 0.45 MG/DL (ref 0.3–1.2)
BUN BLDV-MCNC: 14 MG/DL (ref 8–23)
BUN/CREAT BLD: ABNORMAL (ref 9–20)
CALCIUM SERPL-MCNC: 9.4 MG/DL (ref 8.6–10.4)
CHLORIDE BLD-SCNC: 106 MMOL/L (ref 98–107)
CHOLESTEROL/HDL RATIO: 4
CHOLESTEROL: 214 MG/DL
CO2: 24 MMOL/L (ref 20–31)
CREAT SERPL-MCNC: 0.67 MG/DL (ref 0.5–0.9)
DIFFERENTIAL TYPE: ABNORMAL
EOSINOPHILS RELATIVE PERCENT: 2 % (ref 1–4)
ESTIMATED AVERAGE GLUCOSE: 157 MG/DL
GFR AFRICAN AMERICAN: >60 ML/MIN
GFR NON-AFRICAN AMERICAN: >60 ML/MIN
GFR SERPL CREATININE-BSD FRML MDRD: ABNORMAL ML/MIN/{1.73_M2}
GFR SERPL CREATININE-BSD FRML MDRD: ABNORMAL ML/MIN/{1.73_M2}
GLUCOSE BLD-MCNC: 144 MG/DL (ref 70–99)
HBA1C MFR BLD: 7.1 % (ref 4–6)
HCT VFR BLD CALC: 45.7 % (ref 36.3–47.1)
HDLC SERPL-MCNC: 53 MG/DL
HEMOGLOBIN: 14.2 G/DL (ref 11.9–15.1)
IMMATURE GRANULOCYTES: 0 %
LDL CHOLESTEROL: 117 MG/DL (ref 0–130)
LYMPHOCYTES # BLD: 32 % (ref 24–43)
MCH RBC QN AUTO: 27.3 PG (ref 25.2–33.5)
MCHC RBC AUTO-ENTMCNC: 31.1 G/DL (ref 28.4–34.8)
MCV RBC AUTO: 87.7 FL (ref 82.6–102.9)
MONOCYTES # BLD: 7 % (ref 3–12)
NRBC AUTOMATED: 0 PER 100 WBC
PDW BLD-RTO: 13.5 % (ref 11.8–14.4)
PLATELET # BLD: 310 K/UL (ref 138–453)
PLATELET ESTIMATE: ABNORMAL
PMV BLD AUTO: 12.7 FL (ref 8.1–13.5)
POTASSIUM SERPL-SCNC: 4.6 MMOL/L (ref 3.7–5.3)
RBC # BLD: 5.21 M/UL (ref 3.95–5.11)
RBC # BLD: ABNORMAL 10*6/UL
SEG NEUTROPHILS: 58 % (ref 36–65)
SEGMENTED NEUTROPHILS ABSOLUTE COUNT: 4.66 K/UL (ref 1.5–8.1)
SODIUM BLD-SCNC: 143 MMOL/L (ref 135–144)
TOTAL PROTEIN: 7.4 G/DL (ref 6.4–8.3)
TRIGL SERPL-MCNC: 222 MG/DL
VLDLC SERPL CALC-MCNC: ABNORMAL MG/DL (ref 1–30)
WBC # BLD: 8 K/UL (ref 3.5–11.3)
WBC # BLD: ABNORMAL 10*3/UL

## 2021-03-15 ENCOUNTER — OFFICE VISIT (OUTPATIENT)
Dept: FAMILY MEDICINE CLINIC | Age: 66
End: 2021-03-15
Payer: COMMERCIAL

## 2021-03-15 VITALS
SYSTOLIC BLOOD PRESSURE: 122 MMHG | DIASTOLIC BLOOD PRESSURE: 70 MMHG | BODY MASS INDEX: 31.65 KG/M2 | TEMPERATURE: 98.2 F | WEIGHT: 190 LBS | HEART RATE: 76 BPM | HEIGHT: 65 IN | OXYGEN SATURATION: 98 %

## 2021-03-15 DIAGNOSIS — E11.9 TYPE 2 DIABETES MELLITUS WITHOUT COMPLICATION, WITHOUT LONG-TERM CURRENT USE OF INSULIN (HCC): Primary | ICD-10-CM

## 2021-03-15 DIAGNOSIS — E78.5 DYSLIPIDEMIA: ICD-10-CM

## 2021-03-15 DIAGNOSIS — R79.89 ELEVATED LFTS: ICD-10-CM

## 2021-03-15 LAB
CREATININE URINE POCT: 100
MICROALBUMIN/CREAT 24H UR: 10 MG/G{CREAT}
MICROALBUMIN/CREAT UR-RTO: <30

## 2021-03-15 PROCEDURE — 82044 UR ALBUMIN SEMIQUANTITATIVE: CPT | Performed by: FAMILY MEDICINE

## 2021-03-15 PROCEDURE — G8417 CALC BMI ABV UP PARAM F/U: HCPCS | Performed by: FAMILY MEDICINE

## 2021-03-15 PROCEDURE — 3017F COLORECTAL CA SCREEN DOC REV: CPT | Performed by: FAMILY MEDICINE

## 2021-03-15 PROCEDURE — G8482 FLU IMMUNIZE ORDER/ADMIN: HCPCS | Performed by: FAMILY MEDICINE

## 2021-03-15 PROCEDURE — 3051F HG A1C>EQUAL 7.0%<8.0%: CPT | Performed by: FAMILY MEDICINE

## 2021-03-15 PROCEDURE — G8427 DOCREV CUR MEDS BY ELIG CLIN: HCPCS | Performed by: FAMILY MEDICINE

## 2021-03-15 PROCEDURE — 99214 OFFICE O/P EST MOD 30 MIN: CPT | Performed by: FAMILY MEDICINE

## 2021-03-15 PROCEDURE — 2022F DILAT RTA XM EVC RTNOPTHY: CPT | Performed by: FAMILY MEDICINE

## 2021-03-15 PROCEDURE — 1036F TOBACCO NON-USER: CPT | Performed by: FAMILY MEDICINE

## 2021-03-15 RX ORDER — ATORVASTATIN CALCIUM 40 MG/1
40 TABLET, FILM COATED ORAL DAILY
Qty: 90 TABLET | Refills: 1 | Status: SHIPPED | OUTPATIENT
Start: 2021-03-15 | End: 2021-09-23

## 2021-03-15 ASSESSMENT — ENCOUNTER SYMPTOMS
ALLERGIC/IMMUNOLOGIC NEGATIVE: 1
SHORTNESS OF BREATH: 0
EYES NEGATIVE: 1
BLURRED VISION: 0
GASTROINTESTINAL NEGATIVE: 1
VISUAL CHANGE: 0
RESPIRATORY NEGATIVE: 1

## 2021-03-15 ASSESSMENT — PATIENT HEALTH QUESTIONNAIRE - PHQ9
SUM OF ALL RESPONSES TO PHQ QUESTIONS 1-9: 0
SUM OF ALL RESPONSES TO PHQ QUESTIONS 1-9: 0
SUM OF ALL RESPONSES TO PHQ9 QUESTIONS 1 & 2: 0

## 2021-03-15 NOTE — PROGRESS NOTES
[facility] Progress Note    Date:3/15/2021         Patient Name:Ila Haley     YOB: 1955     Age:65 y.o. Assessment/Plan        Problem List Items Addressed This Visit        Endocrine    Type 2 diabetes mellitus without complication, without long-term current use of insulin (HCC) - Primary     Stable on Metformin XR 500mg 2 tabs in AM 1 tab in PM and Januvia 100mg daily  Monitor A1c         Relevant Orders    POCT microalbumin (Completed)    Hemoglobin A1C       Other    Dyslipidemia     Uncontrolled b/c of T2DM  Increase Lipitor to 40mg daily         Relevant Medications    atorvastatin (LIPITOR) 40 MG tablet    Elevated LFTs     Most likely from NAFLD  Will monitor and apply lifestyle modifications  Recheck in future                Return in about 6 months (around 9/15/2021). Electronically signed by Nuris Hayes DO on 3/15/21         Lizbeth Calix Matthew Haley is a 60 y.o. female presenting today for   Chief Complaint   Patient presents with   [facility]   . Liver Dysfunction  Patient complains of an abnormal ALT that has been associated with no obvious evidence for illness. The problem was first noted  several days ago. A positive history was noted for: obesity. Patient denies blood transfusion, use of illicit drugs, unprotected  sex, family history  of chronic liver disease, en utero exposure to hepatotrophic virus, foreign travel and family history of genetic liver disease (hemochromatosis, CF, galactosemia), alpha 1 antitrypsin deficiency, autoimmune liver disease, CMV, congenital disorders, cystic fibrosis, glycogen or lipid storage disease, hemolytic anemia, hepatitis A, hepatitis B, hepatitis C, mitochondrial diseases, or exposure to street drugs. Diabetes  She presents for her follow-up diabetic visit. She has type 2 (did have gestational with 1st of 4 pregnancies) diabetes mellitus. Her disease course has been stable.  There are no hypoglycemic associated symptoms. Pertinent negatives for hypoglycemia include no confusion, dizziness, headaches, hunger, mood changes, nervousness/anxiousness, pallor, seizures, sleepiness, speech difficulty, sweats or tremors. There are no diabetic associated symptoms. Pertinent negatives for diabetes include no blurred vision, no chest pain, no fatigue, no foot paresthesias, no foot ulcerations, no polydipsia, no polyphagia, no polyuria, no visual change, no weakness and no weight loss. There are no hypoglycemic complications. Pertinent negatives for hypoglycemia complications include no blackouts, no hospitalization, no nocturnal hypoglycemia, no required assistance and no required glucagon injection. Symptoms are stable. Current diabetic treatment includes oral agent (dual therapy). She is compliant with treatment most of the time. Her weight is stable. She is following a generally unhealthy (since covid) diet. An ACE inhibitor/angiotensin II receptor blocker is not being taken. Hyperlipidemia  This is a chronic problem. The current episode started more than 1 year ago. Recent lipid tests were reviewed and are high. Exacerbating diseases include diabetes and obesity. She has no history of chronic renal disease, hypothyroidism, liver disease or nephrotic syndrome. Pertinent negatives include no chest pain, focal sensory loss, focal weakness, leg pain, myalgias or shortness of breath. Current antihyperlipidemic treatment includes statins. The current treatment provides mild improvement of lipids. Review of Systems   Review of Systems   Constitutional: Negative. Negative for fatigue and weight loss. HENT: Negative. Eyes: Negative. Negative for blurred vision. Respiratory: Negative. Negative for shortness of breath. Cardiovascular: Negative. Negative for chest pain. Gastrointestinal: Negative. Endocrine: Negative. Negative for polydipsia, polyphagia and polyuria. Genitourinary: Negative.     Musculoskeletal: Negative. Negative for myalgias. Skin: Negative. Negative for pallor. Allergic/Immunologic: Negative. Neurological: Negative. Negative for dizziness, tremors, focal weakness, seizures, speech difficulty, weakness and headaches. Hematological: Negative. Psychiatric/Behavioral: Negative. Negative for confusion. The patient is not nervous/anxious. All other systems reviewed and are negative. Medications     Current Outpatient Medications   Medication Sig Dispense Refill    atorvastatin (LIPITOR) 40 MG tablet Take 1 tablet by mouth daily 90 tablet 1    SITagliptin (JANUVIA) 100 MG tablet take 1 tablet by mouth once daily 90 tablet 1    metFORMIN (GLUCOPHAGE-XR) 500 MG extended release tablet TAKE 2 TABLETS BY MOUTH IN THE MORNING AND 1 TAB AT BEDTIME 270 tablet 1     No current facility-administered medications for this visit. Past History    Past Medical History:   has a past medical history of Arthritis, Gestational diabetes, History of total right knee replacement, Hyperlipidemia, Type 2 diabetes mellitus ([facility] [address] and Wears glasses. Social History:   reports that she has never smoked. She has never used smokeless tobacco. She reports current alcohol use of about 2.0 standard drinks of alcohol per week. She reports that she does not use drugs.      Family History:   Family History   Problem Relation Age of Onset    Cancer Mother         pancreas    Heart Disease Mother         tachycardia; ? diagnosis    High Blood Pressure Mother     Cancer Father         thyroid, COLON    Diabetes Father     Cancer Maternal Uncle         pancreas; bladder;     Heart Disease Maternal Uncle         hearth failure, ?cause    Cancer Paternal Uncle         lung in smoker    Cancer Paternal Grandfather         esophageal       Surgical History:   Past Surgical History:   Procedure Laterality Date    REPLACEMENT TOTAL KNEE Right 4/9/2019    NAVBETZAIDA TOTAL KNEE, ZACARIAS AND NEPHEW *ADVANCED RIGHT performed by Edith Bernard DO at [address] Right 04/09/2019        Physical Examination      Vitals:  /70   Pulse 76   Temp 98.2 °F (36.8 °C)   Ht 5' 5\" (1.651 m)   Wt 190 lb (86.2 kg)   SpO2 98%   BMI 31.62 kg/m²     Physical Exam  Vitals signs and nursing note reviewed. Constitutional:       General: She is not in acute distress. Appearance: Normal appearance. She is normal weight. She is not ill-appearing, toxic-appearing or diaphoretic. HENT:      Head: Normocephalic and atraumatic. Eyes:      General: No scleral icterus. Right eye: No discharge. Left eye: No discharge. Extraocular Movements: Extraocular movements intact. Conjunctiva/sclera: Conjunctivae normal.   Cardiovascular:      Rate and Rhythm: Normal rate and regular rhythm. Pulses: Normal pulses. Heart sounds: Normal heart sounds. No murmur. No friction rub. No gallop. Pulmonary:      Effort: Pulmonary effort is normal. No respiratory distress. Breath sounds: Normal breath sounds. No stridor. No wheezing, rhonchi or rales. Chest:      Chest wall: No tenderness. Neurological:      Mental Status: She is alert and oriented to person, place, and time. Mental status is at baseline. Psychiatric:         Mood and Affect: Mood normal.         Behavior: Behavior normal.         Thought Content:  Thought content normal.         Judgment: Judgment normal.       Visual inspection:  Deformity/amputation: absent  Skin lesions/pre-ulcerative calluses: absent  Edema: right- negative, left- negative    Sensory exam:  Monofilament sensation: normal  (minimum of 5 random plantar locations tested, avoiding callused areas - > 1 area with absence of sensation is + for neuropathy)    Plus at least one of the following:  Pulses: normal,     Labs/Imaging/Diagnostics   Labs:  Hemoglobin A1C   Date Value Ref Range Status   03/05/2021 7.1 (H) 4.0 - 6.0 % Final       Imaging Last 24 Hours:  XR KNEE RIGHT (3 VIEWS)  History:  Right  total knee arthroplasty    Findings: AP, lateral, merchant view x-rays of the Right  knee done in the   office standing today shows Right total knee arthroplasty in good position   without  complications. No loosening of components is appreciated. No   evidence of fracture, subluxation, dislocation, radiopaque foreign body,   radiopaque tumors noted. Alignment is near-anatomic. Impression: Status post Right total knee arthroplasty as described above.

## 2021-03-15 NOTE — PATIENT INSTRUCTIONS
Patient Education        Learning About Carbohydrate (Carb) Counting and Eating Out When You Have Diabetes  Why plan your meals? Meal planning can be a key part of managing diabetes. Planning meals and snacks with the right balance of carbohydrate, protein, and fat can help you keep your blood sugar at the target level you set with your doctor. You don't have to eat special foods. You can eat what your family eats, including sweets once in a while. But you do have to pay attention to how often you eat and how much you eat of certain foods. You may want to work with a dietitian or a certified diabetes educator. He or she can give you tips and meal ideas and can answer your questions about meal planning. This health professional can also help you reach a healthy weight if that is one of your goals. What should you know about eating carbs? Managing the amount of carbohydrate (carbs) you eat is an important part of healthy meals when you have diabetes. Carbohydrate is found in many foods. · Learn which foods have carbs. And learn the amounts of carbs in different foods. ? Bread, cereal, pasta, and rice have about 15 grams of carbs in a serving. A serving is 1 slice of bread (1 ounce), ½ cup of cooked cereal, or 1/3 cup of cooked pasta or rice. ? Fruits have 15 grams of carbs in a serving. A serving is 1 small fresh fruit, such as an apple or orange; ½ of a banana; ½ cup of cooked or canned fruit; ½ cup of fruit juice; 1 cup of melon or raspberries; or 2 tablespoons of dried fruit. ? Milk and no-sugar-added yogurt have 15 grams of carbs in a serving. A serving is 1 cup of milk or 2/3 cup of no-sugar-added yogurt. ? Starchy vegetables have 15 grams of carbs in a serving. A serving is ½ cup of mashed potatoes or sweet potato; 1 cup winter squash; ½ of a small baked potato; ½ cup of cooked beans; or ½ cup cooked corn or green peas.   · Learn how much carbs to eat each day and at each meal. A dietitian or CDE can teach you how to keep track of the amount of carbs you eat. This is called carbohydrate counting. · If you are not sure how to count carbohydrate grams, use the Plate Method to plan meals. It is a good, quick way to make sure that you have a balanced meal. It also helps you spread carbs throughout the day. ? Divide your plate by types of foods. Put non-starchy vegetables on half the plate, meat or other protein food on one-quarter of the plate, and a grain or starchy vegetable in the final quarter of the plate. To this you can add a small piece of fruit and 1 cup of milk or yogurt, depending on how many carbs you are supposed to eat at a meal.  · Try to eat about the same amount of carbs at each meal. Do not \"save up\" your daily allowance of carbs to eat at one meal.  · Proteins have very little or no carbs per serving. Examples of proteins are beef, chicken, turkey, fish, eggs, tofu, cheese, cottage cheese, and peanut butter. A serving size of meat is 3 ounces, which is about the size of a deck of cards. Examples of meat substitute serving sizes (equal to 1 ounce of meat) are 1/4 cup of cottage cheese, 1 egg, 1 tablespoon of peanut butter, and ½ cup of tofu. How can you eat out and still eat healthy? · Learn to estimate the serving sizes of foods that have carbohydrate. If you measure food at home, it will be easier to estimate the amount in a serving of restaurant food. · If the meal you order has too much carbohydrate (such as potatoes, corn, or baked beans), ask to have a low-carbohydrate food instead. Ask for a salad or green vegetables. · If you use insulin, check your blood sugar before and after eating out to help you plan how much to eat in the future. · If you eat more carbohydrate at a meal than you had planned, take a walk or do other exercise. This will help lower your blood sugar. What are some tips for eating healthy? · Limit saturated fat, such as the fat from meat and dairy products.  This is a healthy choice because people who have diabetes are at higher risk of heart disease. So choose lean cuts of meat and nonfat or low-fat dairy products. Use olive or canola oil instead of butter or shortening when cooking. · Don't skip meals. Your blood sugar may drop too low if you skip meals and take insulin or certain medicines for diabetes. · Check with your doctor before you drink alcohol. Alcohol can cause your blood sugar to drop too low. Alcohol can also cause a bad reaction if you take certain diabetes medicines. Follow-up care is a key part of your treatment and safety. Be sure to make and go to all appointments, and call your doctor if you are having problems. It's also a good idea to know your test results and keep a list of the medicines you take. Where can you learn more? Go to https://IntelligenceBankmelanieeb.Workle. org and sign in to your Joox account. Enter H086 in the Portable Zoo box to learn more about \"Learning About Carbohydrate (Carb) Counting and Eating Out When You Have Diabetes. \"     If you do not have an account, please click on the \"Sign Up Now\" link. Current as of: August 31, 2020               Content Version: 12.8  © 7014-1890 Healthwise, Incorporated. Care instructions adapted under license by [facility] ([facility]). If you have questions about a medical condition or this instruction, always ask your healthcare professional. [address] any warranty or liability for your use of this information.

## 2021-04-27 ENCOUNTER — HOSPITAL ENCOUNTER (OUTPATIENT)
Dept: MAMMOGRAPHY | Age: 66
Discharge: HOME | End: 2021-04-29
Payer: COMMERCIAL

## 2021-04-27 DIAGNOSIS — Z12.31 ENCOUNTER FOR SCREENING MAMMOGRAM FOR MALIGNANT NEOPLASM OF BREAST: ICD-10-CM

## 2021-04-27 PROCEDURE — 77063 BREAST TOMOSYNTHESIS BI: CPT

## 2021-06-14 DIAGNOSIS — E11.9 TYPE 2 DIABETES MELLITUS WITHOUT COMPLICATION, WITHOUT LONG-TERM CURRENT USE OF INSULIN (HCC): ICD-10-CM

## 2021-06-15 RX ORDER — METFORMIN HYDROCHLORIDE 500 MG/1
TABLET, EXTENDED RELEASE ORAL
Qty: 270 TABLET | Refills: 1 | Status: SHIPPED | OUTPATIENT
Start: 2021-06-15 | End: 2022-02-24 | Stop reason: SDUPTHER

## 2021-06-17 ENCOUNTER — HOSPITAL ENCOUNTER (OUTPATIENT)
Age: 66
Setting detail: SPECIMEN
Discharge: HOME | End: 2021-06-17
Payer: COMMERCIAL

## 2021-06-17 DIAGNOSIS — E11.9 TYPE 2 DIABETES MELLITUS WITHOUT COMPLICATION, WITHOUT LONG-TERM CURRENT USE OF INSULIN (HCC): ICD-10-CM

## 2021-06-18 LAB
ESTIMATED AVERAGE GLUCOSE: 154 MG/DL
HBA1C MFR BLD: 7 % (ref 4–6)

## 2021-09-09 ENCOUNTER — TELEPHONE (OUTPATIENT)
Dept: FAMILY MEDICINE CLINIC | Age: 66
End: 2021-09-09

## 2021-09-09 NOTE — TELEPHONE ENCOUNTER
Patient wants to know if you want her A1C checked before her appt 9/17/21 for DM 6 month f/u    Please advise

## 2021-09-23 DIAGNOSIS — E78.5 DYSLIPIDEMIA: ICD-10-CM

## 2021-09-24 RX ORDER — ATORVASTATIN CALCIUM 40 MG/1
TABLET, FILM COATED ORAL
Qty: 90 TABLET | Refills: 1 | Status: SHIPPED | OUTPATIENT
Start: 2021-09-24 | End: 2022-03-24 | Stop reason: SDUPTHER

## 2021-10-22 NOTE — TELEPHONE ENCOUNTER
Suresh Haley is calling to request a refill on the following medication(s):    Medication Request:  Requested Prescriptions     Pending Prescriptions Disp Refills    SITagliptin (JANUVIA) 100 MG tablet [Pharmacy Med Name: Inocencia Coelho 100MG] 90 tablet 1     Sig: TAKE 1 TABLET DAILY       Last Visit Date (If Applicable):  2/64/2915    Next Visit Date:    10/26/2021

## 2021-10-26 ENCOUNTER — OFFICE VISIT (OUTPATIENT)
Dept: FAMILY MEDICINE CLINIC | Age: 66
End: 2021-10-26
Payer: COMMERCIAL

## 2021-10-26 VITALS
DIASTOLIC BLOOD PRESSURE: 76 MMHG | WEIGHT: 185.4 LBS | SYSTOLIC BLOOD PRESSURE: 136 MMHG | OXYGEN SATURATION: 99 % | BODY MASS INDEX: 30.85 KG/M2 | HEART RATE: 66 BPM | TEMPERATURE: 97.5 F

## 2021-10-26 DIAGNOSIS — E78.5 DYSLIPIDEMIA: ICD-10-CM

## 2021-10-26 DIAGNOSIS — E11.9 TYPE 2 DIABETES MELLITUS WITHOUT COMPLICATION, WITHOUT LONG-TERM CURRENT USE OF INSULIN (HCC): Primary | ICD-10-CM

## 2021-10-26 DIAGNOSIS — Z23 NEED FOR IMMUNIZATION AGAINST INFLUENZA: ICD-10-CM

## 2021-10-26 DIAGNOSIS — R79.89 ELEVATED LFTS: ICD-10-CM

## 2021-10-26 DIAGNOSIS — Z23 NEED FOR COVID-19 VACCINE: ICD-10-CM

## 2021-10-26 LAB — HBA1C MFR BLD: 6.3 %

## 2021-10-26 PROCEDURE — G8417 CALC BMI ABV UP PARAM F/U: HCPCS | Performed by: FAMILY MEDICINE

## 2021-10-26 PROCEDURE — G8427 DOCREV CUR MEDS BY ELIG CLIN: HCPCS | Performed by: FAMILY MEDICINE

## 2021-10-26 PROCEDURE — 2022F DILAT RTA XM EVC RTNOPTHY: CPT | Performed by: FAMILY MEDICINE

## 2021-10-26 PROCEDURE — 90471 IMMUNIZATION ADMIN: CPT | Performed by: FAMILY MEDICINE

## 2021-10-26 PROCEDURE — 90694 VACC AIIV4 NO PRSRV 0.5ML IM: CPT | Performed by: FAMILY MEDICINE

## 2021-10-26 PROCEDURE — 1123F ACP DISCUSS/DSCN MKR DOCD: CPT | Performed by: FAMILY MEDICINE

## 2021-10-26 PROCEDURE — 1090F PRES/ABSN URINE INCON ASSESS: CPT | Performed by: FAMILY MEDICINE

## 2021-10-26 PROCEDURE — 99214 OFFICE O/P EST MOD 30 MIN: CPT | Performed by: FAMILY MEDICINE

## 2021-10-26 PROCEDURE — 4040F PNEUMOC VAC/ADMIN/RCVD: CPT | Performed by: FAMILY MEDICINE

## 2021-10-26 PROCEDURE — G8399 PT W/DXA RESULTS DOCUMENT: HCPCS | Performed by: FAMILY MEDICINE

## 2021-10-26 PROCEDURE — 91300 COVID-19, PFIZER VACCINE 30MCG/0.3ML DOSE: CPT | Performed by: FAMILY MEDICINE

## 2021-10-26 PROCEDURE — 83036 HEMOGLOBIN GLYCOSYLATED A1C: CPT | Performed by: FAMILY MEDICINE

## 2021-10-26 PROCEDURE — G8484 FLU IMMUNIZE NO ADMIN: HCPCS | Performed by: FAMILY MEDICINE

## 2021-10-26 PROCEDURE — 3017F COLORECTAL CA SCREEN DOC REV: CPT | Performed by: FAMILY MEDICINE

## 2021-10-26 PROCEDURE — 1036F TOBACCO NON-USER: CPT | Performed by: FAMILY MEDICINE

## 2021-10-26 PROCEDURE — 0003A COVID-19, PFIZER VACCINE 30MCG/0.3ML DOSE: CPT | Performed by: FAMILY MEDICINE

## 2021-10-26 PROCEDURE — 3044F HG A1C LEVEL LT 7.0%: CPT | Performed by: FAMILY MEDICINE

## 2021-10-26 SDOH — ECONOMIC STABILITY: FOOD INSECURITY: WITHIN THE PAST 12 MONTHS, THE FOOD YOU BOUGHT JUST DIDN'T LAST AND YOU DIDN'T HAVE MONEY TO GET MORE.: NEVER TRUE

## 2021-10-26 SDOH — ECONOMIC STABILITY: FOOD INSECURITY: WITHIN THE PAST 12 MONTHS, YOU WORRIED THAT YOUR FOOD WOULD RUN OUT BEFORE YOU GOT MONEY TO BUY MORE.: NEVER TRUE

## 2021-10-26 ASSESSMENT — ENCOUNTER SYMPTOMS
BLURRED VISION: 0
EYES NEGATIVE: 1
GASTROINTESTINAL NEGATIVE: 1
VISUAL CHANGE: 0
ALLERGIC/IMMUNOLOGIC NEGATIVE: 1
RESPIRATORY NEGATIVE: 1
SHORTNESS OF BREATH: 0

## 2021-10-26 ASSESSMENT — PATIENT HEALTH QUESTIONNAIRE - PHQ9
SUM OF ALL RESPONSES TO PHQ QUESTIONS 1-9: 0
SUM OF ALL RESPONSES TO PHQ QUESTIONS 1-9: 0
2. FEELING DOWN, DEPRESSED OR HOPELESS: 0
SUM OF ALL RESPONSES TO PHQ9 QUESTIONS 1 & 2: 0
1. LITTLE INTEREST OR PLEASURE IN DOING THINGS: 0
SUM OF ALL RESPONSES TO PHQ QUESTIONS 1-9: 0

## 2021-10-26 ASSESSMENT — SOCIAL DETERMINANTS OF HEALTH (SDOH): HOW HARD IS IT FOR YOU TO PAY FOR THE VERY BASICS LIKE FOOD, HOUSING, MEDICAL CARE, AND HEATING?: NOT HARD AT ALL

## 2021-10-26 NOTE — ASSESSMENT & PLAN NOTE
Well-controlled, changes made today: decrease Metformin to 500mg BID, medication adherence emphasized and lifestyle modifications recommended

## 2021-10-26 NOTE — PATIENT INSTRUCTIONS
Patient Education        Counting Carbohydrates for Diabetes: Care Instructions  Your Care Instructions     You don't have to eat special foods when you have diabetes. You just have to be careful to eat healthy foods. Carbohydrates (carbs) raise blood sugar higher and quicker than any other nutrient. Carbs are found in desserts, breads and cereals, and fruit. They're also in starchy vegetables. These include potatoes, corn, and grains such as rice and pasta. Carbs are also in milk and yogurt. The more carbs you eat at one time, the higher your blood sugar will rise. Spreading carbs all through the day helps keep your blood sugar levels within your target range. Counting carbs is one of the best ways to keep your blood sugar under control. If you use insulin, counting carbs helps you match the right amount of insulin to the number of grams of carbs in a meal. Then you can change your diet and insulin dose as needed. Testing your blood sugar several times a day can help you learn how carbs affect your blood sugar. A registered dietitian or certified diabetes educator can help you plan meals and snacks. Follow-up care is a key part of your treatment and safety. Be sure to make and go to all appointments, and call your doctor if you are having problems. It's also a good idea to know your test results and keep a list of the medicines you take. How can you care for yourself at home? Know your daily amount of carbohydrates  Your daily amount depends on several things, such as your weight, how active you are, which diabetes medicines you take, and what your goals are for your blood sugar levels. A registered dietitian or certified diabetes educator can help you plan how many carbs to include in each meal and snack. For most adults, a guideline for the daily amount of carbs is:  · 45 to 60 grams at each meal. That's about the same as 3 to 4 carbohydrate servings. · 15 to 20 grams at each snack.  That's about the same as 1 carbohydrate serving. Count carbs  Counting carbs lets you know how much rapid-acting insulin to take before you eat. If you use an insulin pump, you get a constant rate of insulin during the day. So the pump must be programmed at meals. This gives you extra insulin to cover the rise in blood sugar after meals. If you take insulin:  · Learn your own insulin-to-carb ratio. You and your diabetes health professional will figure out the ratio. You can do this by testing your blood sugar after meals. For example, you may need a certain amount of insulin for every 15 grams of carbs. · Add up the carb grams in a meal. Then you can figure out how many units of insulin to take based on your insulin-to-carb ratio. · Exercise lowers blood sugar. You can use less insulin than you would if you were not doing exercise. Keep in mind that timing matters. If you exercise within 1 hour after a meal, your body may need less insulin for that meal than it would if you exercised 3 hours after the meal. Test your blood sugar to find out how exercise affects your need for insulin. If you do or don't take insulin:  · Look at labels on packaged foods. This can tell you how many carbs are in a serving. You can also use guides from the American Diabetes Association. · Be aware of portions, or serving sizes. If a package has two servings and you eat the whole package, you need to double the number of grams of carbohydrate listed for one serving. · Protein, fat, and fiber do not raise blood sugar as much as carbs do. If you eat a lot of these nutrients in a meal, your blood sugar will rise more slowly than it would otherwise. Eat from all food groups  · Eat at least three meals a day. · Plan meals to include food from all the food groups. The food groups include grains, fruits, dairy, proteins, and vegetables. · Talk to your dietitian or diabetes educator about ways to add limited amounts of sweets into your meal plan.   · If you drink alcohol, talk to your doctor. It may not be recommended when you are taking certain diabetes medicines. Where can you learn more? Go to https://chpepiceweb.Venvy Interactive Video. org and sign in to your Signal Data account. Enter G429 in the Remotium box to learn more about \"Counting Carbohydrates for Diabetes: Care Instructions. \"     If you do not have an account, please click on the \"Sign Up Now\" link. Current as of: August 31, 2020               Content Version: 13.0  © 4718-8606 Healthwise, Incorporated. Care instructions adapted under license by [facility] ([facility]). If you have questions about a medical condition or this instruction, always ask your healthcare professional. [address] any warranty or liability for your use of this information.

## 2021-10-26 NOTE — PROGRESS NOTES
[facility] Progress Note    Date:10/26/2021         Patient Name:Ila Haley     YOB: 1955     Age:66 y.o. Assessment/Plan        Problem List Items Addressed This Visit        Endocrine    Type 2 diabetes mellitus without complication, without long-term current use of insulin (HCC) - Primary      Well-controlled, changes made today: decrease Metformin to 500mg BID, medication adherence emphasized and lifestyle modifications recommended         Relevant Orders    HM DIABETES FOOT EXAM (Completed)    POCT glycosylated hemoglobin (Hb A1C)       Other    Dyslipidemia      Well-controlled, continue current medications, continue current treatment plan, medication adherence emphasized and lifestyle modifications recommended         Relevant Orders    Comprehensive Metabolic Panel    Lipid Panel    Elevated LFTs      Asymptomatic, continue current treatment plan           Other Visit Diagnoses     Need for immunization against influenza        Relevant Orders    INFLUENZA, QUADV, ADJUVANTED, 65 YRS =, IM, PF, PREFILL SYR, 0.5ML (FLUAD)    Need for COVID-19 vaccine        Relevant Orders    COVID-19, Pfizer Vaccine 30MCG/0.3mL Dose           Return in about 6 months (around 4/26/2022). Electronically signed by Kvng Do DO on 10/26/21         Lizbeth Ceron Shady Haley is a 73 y.o. female presenting today for   Chief Complaint   Patient presents with    Diabetes   . Liver Dysfunction  Patient complains of an abnormal ALT that has been associated with no obvious evidence for illness. The problem was first noted  several days ago. A positive history was noted for: obesity.  Patient denies blood transfusion, use of illicit drugs, unprotected  sex, family history  of chronic liver disease, en utero exposure to hepatotrophic virus, foreign travel and family history of genetic liver disease (hemochromatosis, CF, galactosemia), alpha 1 antitrypsin deficiency, autoimmune liver disease, CMV, congenital disorders, cystic fibrosis, glycogen or lipid storage disease, hemolytic anemia, hepatitis A, hepatitis B, hepatitis C, mitochondrial diseases, or exposure to street drugs. Diabetes  She presents for her follow-up diabetic visit. She has type 2 (did have gestational with 1st of 4 pregnancies) diabetes mellitus. Her disease course has been stable. There are no hypoglycemic associated symptoms. Pertinent negatives for hypoglycemia include no confusion, dizziness, headaches, hunger, mood changes, nervousness/anxiousness, pallor, seizures, sleepiness, speech difficulty, sweats or tremors. There are no diabetic associated symptoms. Pertinent negatives for diabetes include no blurred vision, no chest pain, no fatigue, no foot paresthesias, no foot ulcerations, no polydipsia, no polyphagia, no polyuria, no visual change, no weakness and no weight loss. There are no hypoglycemic complications. Pertinent negatives for hypoglycemia complications include no blackouts, no hospitalization, no nocturnal hypoglycemia, no required assistance and no required glucagon injection. Symptoms are stable. Current diabetic treatment includes oral agent (dual therapy). She is compliant with treatment most of the time. Her weight is stable. She is following a generally unhealthy (since covid) diet. An ACE inhibitor/angiotensin II receptor blocker is not being taken. Hyperlipidemia  This is a chronic problem. The current episode started more than 1 year ago. Recent lipid tests were reviewed and are high. Exacerbating diseases include diabetes and obesity. She has no history of chronic renal disease, hypothyroidism, liver disease or nephrotic syndrome. Pertinent negatives include no chest pain, focal sensory loss, focal weakness, leg pain, myalgias or shortness of breath. Current antihyperlipidemic treatment includes statins. The current treatment provides mild improvement of lipids.        Review of Systems   Review of Systems   Constitutional: Negative. Negative for fatigue and weight loss. HENT: Negative. Eyes: Negative. Negative for blurred vision. Respiratory: Negative. Negative for shortness of breath. Cardiovascular: Negative. Negative for chest pain. Gastrointestinal: Negative. Endocrine: Negative. Negative for polydipsia, polyphagia and polyuria. Genitourinary: Negative. Musculoskeletal: Negative. Negative for myalgias. Skin: Negative. Negative for pallor. Allergic/Immunologic: Negative. Neurological: Negative. Negative for dizziness, tremors, focal weakness, seizures, speech difficulty, weakness and headaches. Hematological: Negative. Psychiatric/Behavioral: Negative. Negative for confusion. The patient is not nervous/anxious. All other systems reviewed and are negative. Medications     Current Outpatient Medications   Medication Sig Dispense Refill    SITagliptin (JANUVIA) 100 MG tablet TAKE 1 TABLET DAILY 90 tablet 1    atorvastatin (LIPITOR) 40 MG tablet TAKE 1 TABLET DAILY 90 tablet 1    metFORMIN (GLUCOPHAGE-XR) 500 MG extended release tablet TAKE 2 TABLETS IN THE MORNING AND 1 TABLET AT BEDTIME 270 tablet 1     No current facility-administered medications for this visit. Past History    Past Medical History:   has a past medical history of Arthritis, Gestational diabetes, History of total right knee replacement, Hyperlipidemia, Type 2 diabetes mellitus ([address] and Wears glasses. Social History:   reports that she has never smoked. She has never used smokeless tobacco. She reports current alcohol use of about 2.0 standard drinks of alcohol per week. She reports that she does not use drugs.      Family History:   Family History   Problem Relation Age of Onset    Cancer Mother         pancreas    Heart Disease Mother         tachycardia; ? diagnosis    High Blood Pressure Mother     Cancer Father         thyroid, COLON    Diabetes Father     Cancer Maternal Uncle         pancreas; bladder;     Heart Disease Maternal Uncle         hearth failure, ?cause    Cancer Paternal Uncle         lung in smoker    Cancer Paternal Grandfather         esophageal       Surgical History:   Past Surgical History:   Procedure Laterality Date    REPLACEMENT TOTAL KNEE Right 4/9/2019    NAVIO TOTAL KNEE, ZACARIAS AND NEPHEW *ADVANCED RIGHT performed by Vernon Portillo DO at [address] Right 04/09/2019        Physical Examination      Vitals:  /76   Pulse 66   Temp 97.5 °F (36.4 °C) (Temporal)   Wt 185 lb 6.4 oz (84.1 kg)   SpO2 99%   BMI 30.85 kg/m²     Physical Exam  Vitals and nursing note reviewed. Constitutional:       General: She is not in acute distress. Appearance: Normal appearance. She is normal weight. She is not ill-appearing, toxic-appearing or diaphoretic. HENT:      Head: Normocephalic and atraumatic. Eyes:      General: No scleral icterus. Right eye: No discharge. Left eye: No discharge. Extraocular Movements: Extraocular movements intact. Conjunctiva/sclera: Conjunctivae normal.   Cardiovascular:      Rate and Rhythm: Normal rate and regular rhythm. Pulses: Normal pulses. Heart sounds: Normal heart sounds. No murmur heard. No friction rub. No gallop. Pulmonary:      Effort: Pulmonary effort is normal. No respiratory distress. Breath sounds: Normal breath sounds. No stridor. No wheezing, rhonchi or rales. Chest:      Chest wall: No tenderness. Neurological:      Mental Status: She is alert and oriented to person, place, and time. Mental status is at baseline. Psychiatric:         Mood and Affect: Mood normal.         Behavior: Behavior normal.         Thought Content:  Thought content normal.         Judgment: Judgment normal.     Visual inspection:  Deformity/amputation: absent  Skin lesions/pre-ulcerative calluses: absent  Edema: right- negative, left- negative    Sensory exam:  Monofilament sensation: normal  (minimum of 5 random plantar locations tested, avoiding callused areas - > 1 area with absence of sensation is + for neuropathy)    Plus at least one of the following:  Pulses: normal,       Labs/Imaging/Diagnostics   Labs:  Hemoglobin A1C   Date Value Ref Range Status   06/17/2021 7.0 (H) 4.0 - 6.0 % Final       Imaging Last 24 Hours:  [facility] [place] DIGITAL SCREEN BILATERAL  Narrative: EXAMINATION:  SCREENING DIGITAL BILATERAL  MAMMOGRAM WITH TOMOSYNTHESIS, 4/27/2021    TECHNIQUE:  Screening mammography was performed with tomosynthesis including MLO and CC  views of the bilateral breasts. Computer aided detection was used for the  interpretation of this exam.    COMPARISON:  Mammogram dated 11/20/2019, 07/17/2018, 06/18/2014, 07/11/2012    HISTORY:  Screening. FINDINGS:  There are scattered areas of fibroglandular density. There is no suspicious  mass, architectural distortion, or calcification. Impression: No mammographic findings of malignancy. BI-RADS 1    BIRADS - CATEGORY 1    Negative, no evidence of malignancy. Normal interval follow-up is  recommended in 12 months. OVERALL ASSESSMENT - NEGATIVE    A letter of notification will be sent to the patient regarding the results. The [employer] recommends annual mammograms for women 40  years and older.

## 2022-02-24 DIAGNOSIS — E11.9 TYPE 2 DIABETES MELLITUS WITHOUT COMPLICATION, WITHOUT LONG-TERM CURRENT USE OF INSULIN (HCC): ICD-10-CM

## 2022-02-24 RX ORDER — METFORMIN HYDROCHLORIDE 500 MG/1
500 TABLET, EXTENDED RELEASE ORAL 2 TIMES DAILY
Qty: 180 TABLET | Refills: 1 | Status: SHIPPED | OUTPATIENT
Start: 2022-02-24 | End: 2022-08-30

## 2022-02-24 NOTE — TELEPHONE ENCOUNTER
Patient states that you had advised back in Oct. That she can cut her Metformin down to 2 tabs a day this is what she has been doing and now needs a refill sent.       Order pended for how patient states she is taking it

## 2022-03-24 DIAGNOSIS — E78.5 DYSLIPIDEMIA: ICD-10-CM

## 2022-03-24 RX ORDER — ATORVASTATIN CALCIUM 40 MG/1
TABLET, FILM COATED ORAL
Qty: 90 TABLET | Refills: 1 | Status: SHIPPED | OUTPATIENT
Start: 2022-03-24 | End: 2022-09-27 | Stop reason: SDUPTHER

## 2022-03-24 NOTE — TELEPHONE ENCOUNTER
Cody Haley is calling to request a refill on the following medication(s):    Medication Request:  Requested Prescriptions     Pending Prescriptions Disp Refills    atorvastatin (LIPITOR) 40 MG tablet 90 tablet 1     Sig: TAKE 1 TABLET DAILY       Last Visit Date (If Applicable):  59/40/4569    Next Visit Date:    4/28/2022

## 2022-04-18 NOTE — TELEPHONE ENCOUNTER
Rashad Haley is calling to request a refill on the following medication(s):    Medication Request:  Requested Prescriptions     Pending Prescriptions Disp Refills    SITagliptin (JANUVIA) 100 MG tablet [Pharmacy Med Name: Gautam [facility] 100MG] 90 tablet 1     Sig: TAKE 1 TABLET DAILY       Last Visit Date (If Applicable):  76/34/9965    Next Visit Date:    4/28/2022

## 2022-04-22 ENCOUNTER — HOSPITAL ENCOUNTER (OUTPATIENT)
Age: 67
Setting detail: SPECIMEN
Discharge: HOME | End: 2022-04-22

## 2022-04-22 DIAGNOSIS — E78.5 DYSLIPIDEMIA: ICD-10-CM

## 2022-04-22 LAB
ALBUMIN SERPL-MCNC: 4.5 G/DL (ref 3.5–5.2)
ALBUMIN/GLOBULIN RATIO: 1.7 (ref 1–2.5)
ALP BLD-CCNC: 105 U/L (ref 35–104)
ALT SERPL-CCNC: 40 U/L (ref 5–33)
ANION GAP SERPL CALCULATED.3IONS-SCNC: 12 MMOL/L (ref 9–17)
AST SERPL-CCNC: 23 U/L
BILIRUB SERPL-MCNC: 0.46 MG/DL (ref 0.3–1.2)
BUN BLDV-MCNC: 16 MG/DL (ref 8–23)
CALCIUM SERPL-MCNC: 9.8 MG/DL (ref 8.6–10.4)
CHLORIDE BLD-SCNC: 106 MMOL/L (ref 98–107)
CHOLESTEROL/HDL RATIO: 3.9
CHOLESTEROL: 220 MG/DL
CO2: 24 MMOL/L (ref 20–31)
CREAT SERPL-MCNC: 0.83 MG/DL (ref 0.5–0.9)
GFR AFRICAN AMERICAN: >60 ML/MIN
GFR NON-AFRICAN AMERICAN: >60 ML/MIN
GFR SERPL CREATININE-BSD FRML MDRD: ABNORMAL ML/MIN/{1.73_M2}
GLUCOSE BLD-MCNC: 170 MG/DL (ref 70–99)
HDLC SERPL-MCNC: 56 MG/DL
LDL CHOLESTEROL: 117 MG/DL (ref 0–130)
POTASSIUM SERPL-SCNC: 4.7 MMOL/L (ref 3.7–5.3)
SODIUM BLD-SCNC: 142 MMOL/L (ref 135–144)
TOTAL PROTEIN: 7.1 G/DL (ref 6.4–8.3)
TRIGL SERPL-MCNC: 235 MG/DL

## 2022-04-28 ENCOUNTER — OFFICE VISIT (OUTPATIENT)
Dept: FAMILY MEDICINE CLINIC | Age: 67
End: 2022-04-28
Payer: COMMERCIAL

## 2022-04-28 VITALS
WEIGHT: 180 LBS | DIASTOLIC BLOOD PRESSURE: 80 MMHG | TEMPERATURE: 97 F | OXYGEN SATURATION: 97 % | SYSTOLIC BLOOD PRESSURE: 136 MMHG | BODY MASS INDEX: 29.99 KG/M2 | HEIGHT: 65 IN | HEART RATE: 71 BPM

## 2022-04-28 DIAGNOSIS — R79.89 ELEVATED LFTS: ICD-10-CM

## 2022-04-28 DIAGNOSIS — M15.9 PRIMARY OSTEOARTHRITIS INVOLVING MULTIPLE JOINTS: ICD-10-CM

## 2022-04-28 DIAGNOSIS — E11.9 TYPE 2 DIABETES MELLITUS WITHOUT COMPLICATION, WITHOUT LONG-TERM CURRENT USE OF INSULIN (HCC): Primary | ICD-10-CM

## 2022-04-28 DIAGNOSIS — H61.23 BILATERAL HEARING LOSS DUE TO CERUMEN IMPACTION: ICD-10-CM

## 2022-04-28 DIAGNOSIS — E78.5 DYSLIPIDEMIA: ICD-10-CM

## 2022-04-28 PROBLEM — M15.0 PRIMARY OSTEOARTHRITIS INVOLVING MULTIPLE JOINTS: Status: ACTIVE | Noted: 2022-04-28

## 2022-04-28 LAB — HBA1C MFR BLD: 7.3 %

## 2022-04-28 PROCEDURE — 3051F HG A1C>EQUAL 7.0%<8.0%: CPT | Performed by: FAMILY MEDICINE

## 2022-04-28 PROCEDURE — 3017F COLORECTAL CA SCREEN DOC REV: CPT | Performed by: FAMILY MEDICINE

## 2022-04-28 PROCEDURE — 1090F PRES/ABSN URINE INCON ASSESS: CPT | Performed by: FAMILY MEDICINE

## 2022-04-28 PROCEDURE — 4040F PNEUMOC VAC/ADMIN/RCVD: CPT | Performed by: FAMILY MEDICINE

## 2022-04-28 PROCEDURE — 1123F ACP DISCUSS/DSCN MKR DOCD: CPT | Performed by: FAMILY MEDICINE

## 2022-04-28 PROCEDURE — 2022F DILAT RTA XM EVC RTNOPTHY: CPT | Performed by: FAMILY MEDICINE

## 2022-04-28 PROCEDURE — G8427 DOCREV CUR MEDS BY ELIG CLIN: HCPCS | Performed by: FAMILY MEDICINE

## 2022-04-28 PROCEDURE — 99214 OFFICE O/P EST MOD 30 MIN: CPT | Performed by: FAMILY MEDICINE

## 2022-04-28 PROCEDURE — 83036 HEMOGLOBIN GLYCOSYLATED A1C: CPT | Performed by: FAMILY MEDICINE

## 2022-04-28 PROCEDURE — G8417 CALC BMI ABV UP PARAM F/U: HCPCS | Performed by: FAMILY MEDICINE

## 2022-04-28 PROCEDURE — G8399 PT W/DXA RESULTS DOCUMENT: HCPCS | Performed by: FAMILY MEDICINE

## 2022-04-28 PROCEDURE — 1036F TOBACCO NON-USER: CPT | Performed by: FAMILY MEDICINE

## 2022-04-28 PROCEDURE — 69210 REMOVE IMPACTED EAR WAX UNI: CPT | Performed by: FAMILY MEDICINE

## 2022-04-28 RX ORDER — ACETAMINOPHEN 325 MG/1
650 TABLET ORAL EVERY 6 HOURS PRN
COMMUNITY

## 2022-04-28 ASSESSMENT — ENCOUNTER SYMPTOMS
DIARRHEA: 0
VISUAL CHANGE: 0
RHINORRHEA: 0
GASTROINTESTINAL NEGATIVE: 1
SHORTNESS OF BREATH: 0
ABDOMINAL PAIN: 0
VOMITING: 0
COUGH: 0
SORE THROAT: 0
BLURRED VISION: 0
EYES NEGATIVE: 1
RESPIRATORY NEGATIVE: 1
ALLERGIC/IMMUNOLOGIC NEGATIVE: 1

## 2022-04-28 NOTE — PROGRESS NOTES
[facility] Progress Note    Date:4/28/2022         Patient Name:Ila Haley     YOB: 1955     Age:66 y.o. Assessment/Plan        Problem List Items Addressed This Visit        Endocrine    Type 2 diabetes mellitus without complication, without long-term current use of insulin ([facility] [address] - Primary      Borderline controlled, continue current medications, continue current treatment plan, medication adherence emphasized and lifestyle modifications recommended            Relevant Orders    POCT glycosylated hemoglobin (Hb A1C) (Completed)    Hemoglobin A1C       Musculoskeletal and Integument    Primary osteoarthritis involving multiple joints      Well-controlled, continue current medications and continue current treatment plan         Relevant Medications    acetaminophen (TYLENOL) 325 MG tablet       Other    Dyslipidemia      Borderline controlled, continue current medications, continue current treatment plan, medication adherence emphasized and lifestyle modifications recommended         Relevant Orders    Lipid Panel    Elevated LFTs      Asymptomatic, continue current treatment plan         Relevant Orders    Comprehensive Metabolic Panel      Other Visit Diagnoses     Bilateral hearing loss due to cerumen impaction        Cleaned in office today  Successful on left but only partially successful on left - debrox           Return in about 6 months (around 10/28/2022). Electronically signed by Meenu Camacho DO on 4/28/22       Total time spent was between Time personally spent assessing and managing the patient on the date of service: Est: 30-39 minutes ([id number]) mins.  This included time spent reviewing the patient's medical record (e.g., recent visits, labs, and studies); seeing the patient in the office (face-to-face time); ordering medications, studies, procedures, or referrals; calling the patient or family later in the day with results and further recommendations; and documenting the visit in the medical record. Lizbeth Haley is a 73 y.o. female presenting today for   Chief Complaint   Patient presents with    Follow-up     6 month    Diabetes     A1C today 7.3   . Liver Dysfunction  Patient complains of an abnormal ALT that has been associated with no obvious evidence for illness. The problem was first noted  several days ago. A positive history was noted for: obesity. Patient denies blood transfusion, use of illicit drugs, unprotected  sex, family history  of chronic liver disease, en utero exposure to hepatotrophic virus, foreign travel and family history of genetic liver disease (hemochromatosis, CF, galactosemia), alpha 1 antitrypsin deficiency, autoimmune liver disease, CMV, congenital disorders, cystic fibrosis, glycogen or lipid storage disease, hemolytic anemia, hepatitis A, hepatitis B, hepatitis C, mitochondrial diseases, or exposure to street drugs. The 10-year ASCVD risk score (Lj Holloway, et al., 2013) is: 12.2%    Values used to calculate the score:      Age: 73 years      Sex: Female      Is Non- : No      Diabetic: Yes      Tobacco smoker: No      Systolic Blood Pressure: 450 mmHg      Is BP treated: No      HDL Cholesterol: 56 mg/dL      Total Cholesterol: 220 mg/dL    Diabetes  She presents for her follow-up diabetic visit. She has type 2 (did have gestational with 1st of 4 pregnancies) diabetes mellitus. Her disease course has been stable. There are no hypoglycemic associated symptoms. Pertinent negatives for hypoglycemia include no confusion, dizziness, headaches, hunger, mood changes, nervousness/anxiousness, pallor, seizures, sleepiness, speech difficulty, sweats or tremors. There are no diabetic associated symptoms.  Pertinent negatives for diabetes include no blurred vision, no chest pain, no fatigue, no foot paresthesias, no foot ulcerations, no polydipsia, no polyphagia, no polyuria, no visual change, no weakness and no weight loss. There are no hypoglycemic complications. Pertinent negatives for hypoglycemia complications include no blackouts, no hospitalization, no nocturnal hypoglycemia, no required assistance and no required glucagon injection. Symptoms are stable. Current diabetic treatment includes oral agent (dual therapy). She is compliant with treatment most of the time. Her weight is stable. She is following a generally unhealthy (since covid) diet. An ACE inhibitor/angiotensin II receptor blocker is not being taken. Hyperlipidemia  This is a chronic problem. The current episode started more than 1 year ago. Recent lipid tests were reviewed and are high. Exacerbating diseases include diabetes and obesity. She has no history of chronic renal disease, hypothyroidism, liver disease or nephrotic syndrome. Pertinent negatives include no chest pain, focal sensory loss, focal weakness, leg pain, myalgias or shortness of breath. Current antihyperlipidemic treatment includes statins. The current treatment provides mild improvement of lipids. Ear Fullness   There is pain in both ears. This is a new problem. The current episode started 1 to 4 weeks ago. The problem occurs constantly. The problem has been gradually worsening. There has been no fever. The patient is experiencing no pain. Associated symptoms include hearing loss. Pertinent negatives include no abdominal pain, coughing, diarrhea, ear discharge, headaches, neck pain, rash, rhinorrhea, sore throat or vomiting. She has tried nothing for the symptoms. The treatment provided no relief. Review of Systems   Review of Systems   Constitutional: Negative. Negative for fatigue and weight loss. HENT: Positive for hearing loss. Negative for ear discharge, rhinorrhea and sore throat. Eyes: Negative. Negative for blurred vision. Respiratory: Negative. Negative for cough and shortness of breath. Cardiovascular: Negative. Negative for chest pain. Gastrointestinal: Negative. Negative for abdominal pain, diarrhea and vomiting. Endocrine: Negative. Negative for polydipsia, polyphagia and polyuria. Genitourinary: Negative. Musculoskeletal: Negative. Negative for myalgias and neck pain. Skin: Negative. Negative for pallor and rash. Allergic/Immunologic: Negative. Neurological: Negative. Negative for dizziness, tremors, focal weakness, seizures, speech difficulty, weakness and headaches. Hematological: Negative. Psychiatric/Behavioral: Negative. Negative for confusion. The patient is not nervous/anxious. All other systems reviewed and are negative. Medications     Current Outpatient Medications   Medication Sig Dispense Refill    acetaminophen (TYLENOL) 325 MG tablet Take 650 mg by mouth every 6 hours as needed      SITagliptin (JANUVIA) 100 MG tablet TAKE 1 TABLET DAILY 90 tablet 1    atorvastatin (LIPITOR) 40 MG tablet TAKE 1 TABLET DAILY 90 tablet 1    metFORMIN (GLUCOPHAGE-XR) 500 MG extended release tablet Take 1 tablet by mouth 2 times daily 180 tablet 1     No current facility-administered medications for this visit. Past History    Past Medical History:   has a past medical history of Arthritis, Gestational diabetes, History of total right knee replacement, Hyperlipidemia, Type 2 diabetes mellitus ([address] and Wears glasses. Social History:   reports that she has never smoked. She has never used smokeless tobacco. She reports current alcohol use of about 2.0 standard drinks of alcohol per week. She reports that she does not use drugs.      Family History:   Family History   Problem Relation Age of Onset    Cancer Mother         pancreas    Heart Disease Mother         tachycardia; ? diagnosis    High Blood Pressure Mother     Cancer Father         thyroid, COLON    Diabetes Father     Cancer Maternal Uncle         pancreas; bladder;     Heart Disease Maternal Uncle         hearth failure, ?cause    Cancer Paternal Uncle         lung in smoker    Cancer Paternal Grandfather         esophageal       Surgical History:   Past Surgical History:   Procedure Laterality Date    REPLACEMENT TOTAL KNEE Right 4/9/2019    NAVIO TOTAL KNEE, ZACARIAS AND NEPHEW *ADVANCED RIGHT performed by Kimberli Pelaez DO at [address] [place] [address] Right 04/09/2019        Physical Examination      Vitals:  /80 (Site: Left Upper Arm, Position: Sitting, Cuff Size: Large Adult)   Pulse 71   Temp 97 °F (36.1 °C) (Temporal)   Ht 5' 5\" (1.651 m)   Wt 180 lb (81.6 kg)   SpO2 97%   BMI 29.95 kg/m²     Physical Exam  Vitals and nursing note reviewed. Constitutional:       General: She is not in acute distress. Appearance: Normal appearance. She is overweight. She is not ill-appearing, toxic-appearing or diaphoretic. HENT:      Head: Normocephalic and atraumatic. Right Ear: External ear normal. Decreased hearing noted. There is impacted cerumen. Left Ear: External ear normal. Decreased hearing noted. There is impacted cerumen. Eyes:      General: No scleral icterus. Right eye: No discharge. Left eye: No discharge. Extraocular Movements: Extraocular movements intact. Conjunctiva/sclera: Conjunctivae normal.   Cardiovascular:      Rate and Rhythm: Normal rate and regular rhythm. Pulses: Normal pulses. Heart sounds: Normal heart sounds. No murmur heard. No friction rub. No gallop. Pulmonary:      Effort: Pulmonary effort is normal. No respiratory distress. Breath sounds: Normal breath sounds. No stridor. No wheezing, rhonchi or rales. Chest:      Chest wall: No tenderness. Neurological:      Mental Status: She is alert and oriented to person, place, and time. Mental status is at baseline. Psychiatric:         Mood and Affect: Mood normal.         Behavior: Behavior normal.         Thought Content:  Thought content normal.         Judgment: Judgment normal.     Patient Name: Manuelito Haley   Medical Record Number: [id number]  Date: 4/28/2022   Time: 3:51 PM     Ear Cerumen Removal Procedure Note  Indication: ear cerumen impaction and unable to visualize tympanic membrane    Procedure: After placing the patient's head in the appropriate position, the patient's bilateral ear canal was irrigated with the appropriate solution and curetted until the majority of the cerumen was flushed out of the canal.  At this point, the procedure was complete. The patient tolerated the procedure well. Complications: None        Labs/Imaging/Diagnostics   Labs:  Hemoglobin A1C   Date Value Ref Range Status   04/28/2022 7.3 % Final       Imaging Last 24 Hours:  [facility] [place] DIGITAL SCREEN BILATERAL  Narrative: EXAMINATION:  SCREENING DIGITAL BILATERAL  MAMMOGRAM WITH TOMOSYNTHESIS, 4/27/2021    TECHNIQUE:  Screening mammography was performed with tomosynthesis including MLO and CC  views of the bilateral breasts. Computer aided detection was used for the  interpretation of this exam.    COMPARISON:  Mammogram dated 11/20/2019, 07/17/2018, 06/18/2014, 07/11/2012    HISTORY:  Screening. FINDINGS:  There are scattered areas of fibroglandular density. There is no suspicious  mass, architectural distortion, or calcification. Impression: No mammographic findings of malignancy. BI-RADS 1    BIRADS - CATEGORY 1    Negative, no evidence of malignancy. Normal interval follow-up is  recommended in 12 months. OVERALL ASSESSMENT - NEGATIVE    A letter of notification will be sent to the patient regarding the results. The [employer] recommends annual mammograms for women 40  years and older.

## 2022-04-28 NOTE — PATIENT INSTRUCTIONS
Continue: atropine (atropine): drops: 1% 1 drop twice a day as directed into affected eye 03- Patient Education        Counting Carbohydrates for Diabetes: Care Instructions  Overview     Managing the amount of carbohydrate (carbs) you eat is an important part of planning healthy meals when you have diabetes. Carbs raise blood sugar more than any other nutrient. Carbs are found in grains, starchy vegetables, fruits,and milk and yogurt. Carbs are also found in sugar-sweetened foods and drinks. The more carbs you eat at one time, the higher your blood sugar will rise. Counting carbs can help you keep your blood sugar within your target range. If you use insulin, counting carbs helps you match the right amount of insulinto the number of grams of carbs in a meal.  A registered dietitian or diabetes educator can help you plan meals and snacks. Follow-up care is a key part of your treatment and safety. Be sure to make and go to all appointments, and call your doctor if you are having problems. It's also a good idea to know your test results and keep alist of the medicines you take. How can you care for yourself at home? Know your daily amount of carbohydrates  Your daily amount depends on several things, such as your weight, how active you are, which diabetes medicines you take, and what your goals are for your blood sugar levels. A registered dietitian or diabetes educator can help youplan how many carbs to include in each meal and snack. For most adults, a guideline for the daily amount of carbs is:   45 to 60 grams at each meal. That's about the same as 3 to 4 carbohydrate servings.  15 to 20 grams at each snack. That's about the same as 1 carbohydrate serving. Count carbs  Counting carbs lets you know how much rapid-acting insulin to take before you eat. If you use an insulin pump, you get a constant rate of insulin during the day. So the pump must be programmed at meals. This gives you extra insulin tocover the rise in blood sugar after meals.   If you take insulin:   Learn your own insulin-to-carb ratio. You and your diabetes health professional will figure out the ratio. You can do this by testing your blood sugar after meals. For example, you may need a certain amount of insulin for every 15 grams of carbs.  Add up the carb grams in a meal. Then you can figure out how many units of insulin to take based on your insulin-to-carb ratio.  Exercise lowers blood sugar. You can use less insulin than you would if you were not doing exercise. Keep in mind that timing matters. If you exercise within 1 hour after a meal, your body may need less insulin for that meal than it would if you exercised 3 hours after the meal. Test your blood sugar to find out how exercise affects your need for insulin. If you do or don't take insulin:   Look at labels on packaged foods. This can tell you how many carbs are in a serving.  Be aware of portions, or serving sizes. If a package has two servings and you eat the whole package, you need to double the number of grams of carbohydrate listed for one serving.  Protein, fat, and fiber do not raise blood sugar as much as carbs do. If you eat a lot of these nutrients in a meal, your blood sugar will rise more slowly than it would otherwise. Where can you learn more? Go to https://[employer].Validus. org and sign in to your [employer] account. Enter W965 in the [employer] box to learn more about \"Counting Carbohydrates for Diabetes: Care Instructions. \"     If you do not have an account, please click on the \"Sign Up Now\" link. Current as of: July 28, 2021               Content Version: 13.2  © 2006-2022 [employer]. Care instructions adapted under license by [employer]. If you have questions about a medical condition or this instruction, always ask your healthcare professional. [place] [address] any warranty or liability for your use of this information.

## 2022-08-30 DIAGNOSIS — E11.9 TYPE 2 DIABETES MELLITUS WITHOUT COMPLICATION, WITHOUT LONG-TERM CURRENT USE OF INSULIN (HCC): ICD-10-CM

## 2022-08-30 RX ORDER — METFORMIN HYDROCHLORIDE 500 MG/1
TABLET, EXTENDED RELEASE ORAL
Qty: 180 TABLET | Refills: 3 | Status: SHIPPED | OUTPATIENT
Start: 2022-08-30 | End: 2022-10-27

## 2022-08-30 NOTE — TELEPHONE ENCOUNTER
Liliana Haley is calling to request a refill on the following medication(s):    Medication Request:  Requested Prescriptions     Pending Prescriptions Disp Refills    metFORMIN (GLUCOPHAGE-XR) 500 MG extended release tablet [Pharmacy Med Name: METFORMIN HCL ER TABS 500MG] 180 tablet 3     Sig: TAKE 1 TABLET TWICE A DAY       Last Visit Date (If Applicable):  8/67/9447    Next Visit Date:    10/27/2022

## 2022-09-26 DIAGNOSIS — E78.5 DYSLIPIDEMIA: ICD-10-CM

## 2022-09-26 NOTE — TELEPHONE ENCOUNTER
Patient needs new script sent to [facility] please, I told her to get her blood work done. She sees you in October.     Thank Christiano Dupont

## 2022-09-27 RX ORDER — ATORVASTATIN CALCIUM 40 MG/1
TABLET, FILM COATED ORAL
Qty: 90 TABLET | Refills: 1 | Status: SHIPPED | OUTPATIENT
Start: 2022-09-27

## 2022-10-24 ENCOUNTER — HOSPITAL ENCOUNTER (OUTPATIENT)
Age: 67
Setting detail: SPECIMEN
Discharge: HOME | End: 2022-10-24

## 2022-10-24 DIAGNOSIS — R79.89 ELEVATED LFTS: ICD-10-CM

## 2022-10-24 LAB
ALBUMIN SERPL-MCNC: 4.1 G/DL (ref 3.5–5.2)
ALBUMIN/GLOBULIN RATIO: 1.2 (ref 1–2.5)
ALP BLD-CCNC: 102 U/L (ref 35–104)
ALT SERPL-CCNC: 32 U/L (ref 5–33)
ANION GAP SERPL CALCULATED.3IONS-SCNC: 21 MMOL/L (ref 9–17)
AST SERPL-CCNC: 20 U/L
BILIRUB SERPL-MCNC: 0.4 MG/DL (ref 0.3–1.2)
BUN BLDV-MCNC: 21 MG/DL (ref 8–23)
CALCIUM SERPL-MCNC: 9.4 MG/DL (ref 8.6–10.4)
CHLORIDE BLD-SCNC: 104 MMOL/L (ref 98–107)
CO2: 17 MMOL/L (ref 20–31)
CREAT SERPL-MCNC: 0.73 MG/DL (ref 0.5–0.9)
GFR SERPL CREATININE-BSD FRML MDRD: >60 ML/MIN/1.73M2
GLUCOSE BLD-MCNC: 158 MG/DL (ref 70–99)
POTASSIUM SERPL-SCNC: 4.3 MMOL/L (ref 3.7–5.3)
SODIUM BLD-SCNC: 142 MMOL/L (ref 135–144)
TOTAL PROTEIN: 7.6 G/DL (ref 6.4–8.3)

## 2022-10-26 ENCOUNTER — HOSPITAL ENCOUNTER (OUTPATIENT)
Age: 67
Setting detail: SPECIMEN
Discharge: HOME | End: 2022-10-26

## 2022-10-26 DIAGNOSIS — E11.9 TYPE 2 DIABETES MELLITUS WITHOUT COMPLICATION, WITHOUT LONG-TERM CURRENT USE OF INSULIN (HCC): ICD-10-CM

## 2022-10-26 DIAGNOSIS — E78.5 DYSLIPIDEMIA: ICD-10-CM

## 2022-10-26 LAB
CHOLESTEROL/HDL RATIO: 4.1
CHOLESTEROL: 207 MG/DL
ESTIMATED AVERAGE GLUCOSE: 169 MG/DL
HBA1C MFR BLD: 7.5 % (ref 4–6)
HDLC SERPL-MCNC: 50 MG/DL
LDL CHOLESTEROL: 118 MG/DL (ref 0–130)
TRIGL SERPL-MCNC: 193 MG/DL

## 2022-10-27 ENCOUNTER — OFFICE VISIT (OUTPATIENT)
Dept: FAMILY MEDICINE CLINIC | Age: 67
End: 2022-10-27
Payer: COMMERCIAL

## 2022-10-27 ENCOUNTER — TELEPHONE (OUTPATIENT)
Dept: FAMILY MEDICINE CLINIC | Age: 67
End: 2022-10-27

## 2022-10-27 VITALS
BODY MASS INDEX: 29.62 KG/M2 | OXYGEN SATURATION: 99 % | WEIGHT: 178 LBS | SYSTOLIC BLOOD PRESSURE: 130 MMHG | HEART RATE: 70 BPM | DIASTOLIC BLOOD PRESSURE: 70 MMHG

## 2022-10-27 DIAGNOSIS — D72.829 LEUKOCYTOSIS, UNSPECIFIED TYPE: ICD-10-CM

## 2022-10-27 DIAGNOSIS — E78.5 DYSLIPIDEMIA: ICD-10-CM

## 2022-10-27 DIAGNOSIS — E11.9 TYPE 2 DIABETES MELLITUS WITHOUT COMPLICATION, WITHOUT LONG-TERM CURRENT USE OF INSULIN (HCC): Primary | ICD-10-CM

## 2022-10-27 DIAGNOSIS — Z23 NEEDS FLU SHOT: ICD-10-CM

## 2022-10-27 DIAGNOSIS — R79.89 ELEVATED LFTS: ICD-10-CM

## 2022-10-27 DIAGNOSIS — Z12.31 ENCOUNTER FOR SCREENING MAMMOGRAM FOR MALIGNANT NEOPLASM OF BREAST: ICD-10-CM

## 2022-10-27 DIAGNOSIS — M15.9 PRIMARY OSTEOARTHRITIS INVOLVING MULTIPLE JOINTS: ICD-10-CM

## 2022-10-27 PROCEDURE — 1123F ACP DISCUSS/DSCN MKR DOCD: CPT | Performed by: FAMILY MEDICINE

## 2022-10-27 PROCEDURE — 3017F COLORECTAL CA SCREEN DOC REV: CPT | Performed by: FAMILY MEDICINE

## 2022-10-27 PROCEDURE — 99214 OFFICE O/P EST MOD 30 MIN: CPT | Performed by: FAMILY MEDICINE

## 2022-10-27 PROCEDURE — 90471 IMMUNIZATION ADMIN: CPT | Performed by: FAMILY MEDICINE

## 2022-10-27 PROCEDURE — G8417 CALC BMI ABV UP PARAM F/U: HCPCS | Performed by: FAMILY MEDICINE

## 2022-10-27 PROCEDURE — G8484 FLU IMMUNIZE NO ADMIN: HCPCS | Performed by: FAMILY MEDICINE

## 2022-10-27 PROCEDURE — G8427 DOCREV CUR MEDS BY ELIG CLIN: HCPCS | Performed by: FAMILY MEDICINE

## 2022-10-27 PROCEDURE — 1036F TOBACCO NON-USER: CPT | Performed by: FAMILY MEDICINE

## 2022-10-27 PROCEDURE — 1090F PRES/ABSN URINE INCON ASSESS: CPT | Performed by: FAMILY MEDICINE

## 2022-10-27 PROCEDURE — G8399 PT W/DXA RESULTS DOCUMENT: HCPCS | Performed by: FAMILY MEDICINE

## 2022-10-27 PROCEDURE — 90694 VACC AIIV4 NO PRSRV 0.5ML IM: CPT | Performed by: FAMILY MEDICINE

## 2022-10-27 PROCEDURE — 2022F DILAT RTA XM EVC RTNOPTHY: CPT | Performed by: FAMILY MEDICINE

## 2022-10-27 PROCEDURE — 3051F HG A1C>EQUAL 7.0%<8.0%: CPT | Performed by: FAMILY MEDICINE

## 2022-10-27 RX ORDER — METFORMIN HYDROCHLORIDE 500 MG/1
TABLET, EXTENDED RELEASE ORAL
Qty: 180 TABLET | Refills: 3
Start: 2022-10-27 | End: 2022-11-20 | Stop reason: SDUPTHER

## 2022-10-27 SDOH — ECONOMIC STABILITY: FOOD INSECURITY: WITHIN THE PAST 12 MONTHS, THE FOOD YOU BOUGHT JUST DIDN'T LAST AND YOU DIDN'T HAVE MONEY TO GET MORE.: NEVER TRUE

## 2022-10-27 SDOH — ECONOMIC STABILITY: FOOD INSECURITY: WITHIN THE PAST 12 MONTHS, YOU WORRIED THAT YOUR FOOD WOULD RUN OUT BEFORE YOU GOT MONEY TO BUY MORE.: NEVER TRUE

## 2022-10-27 ASSESSMENT — ENCOUNTER SYMPTOMS
EYES NEGATIVE: 1
ALLERGIC/IMMUNOLOGIC NEGATIVE: 1
VISUAL CHANGE: 0
BLURRED VISION: 0
GASTROINTESTINAL NEGATIVE: 1
RESPIRATORY NEGATIVE: 1
SHORTNESS OF BREATH: 0

## 2022-10-27 ASSESSMENT — PATIENT HEALTH QUESTIONNAIRE - PHQ9
SUM OF ALL RESPONSES TO PHQ9 QUESTIONS 1 & 2: 0
SUM OF ALL RESPONSES TO PHQ QUESTIONS 1-9: 0
2. FEELING DOWN, DEPRESSED OR HOPELESS: 0
1. LITTLE INTEREST OR PLEASURE IN DOING THINGS: 0
SUM OF ALL RESPONSES TO PHQ QUESTIONS 1-9: 0

## 2022-10-27 ASSESSMENT — SOCIAL DETERMINANTS OF HEALTH (SDOH): HOW HARD IS IT FOR YOU TO PAY FOR THE VERY BASICS LIKE FOOD, HOUSING, MEDICAL CARE, AND HEATING?: NOT HARD AT ALL

## 2022-10-27 NOTE — TELEPHONE ENCOUNTER
Patient would like lab orders for her 6 mth routine visit please, I will mail them to her.     Thank you,   Ivette Castillo

## 2022-10-27 NOTE — PROGRESS NOTES
[facility] Progress Note    Date:10/27/2022         Patient Name:Ila Haley     YOB: 1955     Age:67 y.o. Assessment/Plan        Problem List Items Addressed This Visit          Endocrine    Type 2 diabetes mellitus without complication, without long-term current use of insulin (HCC)      Borderline controlled, changes made today: increase Metformin to 1500mg dialy, medication adherence emphasized and lifestyle modifications recommended         Relevant Medications    metFORMIN (GLUCOPHAGE-XR) 500 MG extended release tablet       Musculoskeletal and Integument    Primary osteoarthritis involving multiple joints      Monitored by specialist- no acute findings meriting change in the plan            Other    Dyslipidemia      Well-controlled, continue current medications, continue current treatment plan, medication adherence emphasized and lifestyle modifications recommended          Other Visit Diagnoses       Needs flu shot    -  Primary    Relevant Orders    Influenza, FLUAD, (age 72 y+), IM, Preservative Free, 0.5 mL (Completed)    Encounter for screening mammogram for malignant neoplasm of breast        Relevant Orders    [facility] DIGITAL SCREEN W OR WO CAD BILATERAL             Return in about 6 months (around 4/27/2023). Electronically signed by Agus Nuno DO on 10/27/22       Total time spent was between Time personally spent assessing and managing the patient on the date of service: Est: 30-39 minutes ([id number]) mins. This included time spent reviewing the patient's medical record (e.g., recent visits, labs, and studies); seeing the patient in the office (face-to-face time); ordering medications, studies, procedures, or referrals; calling the patient or family later in the day with results and further recommendations; and documenting the visit in the medical record.      Subjective     Crystal Haley is a 78 y.o. female presenting today for   Chief Complaint   Patient presents with Diabetes   . Priya Haley is an 78 y.o. female who presents with arthralgias that began several months ago. Pain is located in the left knee(s). The pain is described as constant, moderate, aching. Associated symptoms include: crepitation and decreased range of motion. Had right knee replaced by Dr. Kely Acosta. Had steroid shot in left knee previously and it helped    Diabetes  She presents for her follow-up diabetic visit. She has type 2 (did have gestational with 1st of 4 pregnancies) diabetes mellitus. Her disease course has been stable. There are no hypoglycemic associated symptoms. Pertinent negatives for hypoglycemia include no confusion, dizziness, hunger, mood changes, nervousness/anxiousness, pallor, seizures, sleepiness, speech difficulty, sweats or tremors. There are no diabetic associated symptoms. Pertinent negatives for diabetes include no blurred vision, no chest pain, no fatigue, no foot paresthesias, no foot ulcerations, no polydipsia, no polyphagia, no polyuria, no visual change, no weakness and no weight loss. There are no hypoglycemic complications. Pertinent negatives for hypoglycemia complications include no blackouts, no hospitalization, no nocturnal hypoglycemia, no required assistance and no required glucagon injection. Symptoms are stable. Current diabetic treatment includes oral agent (dual therapy). She is compliant with treatment most of the time. Her weight is stable. She is following a generally unhealthy (since covid) diet. An ACE inhibitor/angiotensin II receptor blocker is not being taken. Hyperlipidemia  This is a chronic problem. The current episode started more than 1 year ago. Recent lipid tests were reviewed and are high. Exacerbating diseases include diabetes and obesity. She has no history of chronic renal disease, hypothyroidism, liver disease or nephrotic syndrome.  Pertinent negatives include no chest pain, focal sensory loss, focal weakness, leg pain, myalgias or shortness of breath. Current antihyperlipidemic treatment includes statins. The current treatment provides mild improvement of lipids. Review of Systems   Review of Systems   Constitutional: Negative. Negative for fatigue and weight loss. HENT: Negative. Eyes: Negative. Negative for blurred vision. Respiratory: Negative. Negative for shortness of breath. Cardiovascular: Negative. Negative for chest pain. Gastrointestinal: Negative. Endocrine: Negative. Negative for polydipsia, polyphagia and polyuria. Genitourinary: Negative. Musculoskeletal: Negative. Negative for myalgias. Skin: Negative. Negative for pallor. Allergic/Immunologic: Negative. Neurological: Negative. Negative for dizziness, tremors, focal weakness, seizures, speech difficulty and weakness. Hematological: Negative. Psychiatric/Behavioral: Negative. Negative for confusion. The patient is not nervous/anxious. All other systems reviewed and are negative. Medications     Current Outpatient Medications   Medication Sig Dispense Refill    metFORMIN (GLUCOPHAGE-XR) 500 MG extended release tablet Take 2 tablets by mouth every morning (before breakfast) AND 1 tablet Daily with supper. 180 tablet 3    SITagliptin (JANUVIA) 100 MG tablet TAKE 1 TABLET DAILY 90 tablet 3    atorvastatin (LIPITOR) 40 MG tablet TAKE 1 TABLET DAILY 90 tablet 1    acetaminophen (TYLENOL) 325 MG tablet Take 650 mg by mouth every 6 hours as needed       No current facility-administered medications for this visit. Past History    Past Medical History:   has a past medical history of Arthritis, Gestational diabetes, History of total right knee replacement, Hyperlipidemia, Type 2 diabetes mellitus ([address] and Wears glasses. Social History:   reports that she has never smoked. She has never used smokeless tobacco. She reports current alcohol use of about 2.0 standard drinks per week. She reports that she does not use drugs. Family History:   Family History   Problem Relation Age of Onset    Cancer Mother         pancreas    Heart Disease Mother         tachycardia; ? diagnosis    High Blood Pressure Mother     Cancer Father         thyroid, COLON    Diabetes Father     Cancer Maternal Uncle         pancreas; bladder;     Heart Disease Maternal Uncle         hearth failure, ?cause    Cancer Paternal Uncle         lung in smoker    Cancer Paternal Grandfather         esophageal       Surgical History:   Past Surgical History:   Procedure Laterality Date    REPLACEMENT TOTAL KNEE Right 4/9/2019    NAVIO TOTAL KNEE, ZACARIAS AND NEPHEW *ADVANCED RIGHT performed by Yesica Salazar DO at [address] Right 04/09/2019        Physical Examination      Vitals:  /70   Pulse 70   Wt 178 lb (80.7 kg)   SpO2 99%   BMI 29.62 kg/m²     Physical Exam  Vitals and nursing note reviewed. Constitutional:       General: She is not in acute distress. Appearance: Normal appearance. She is overweight. She is not ill-appearing, toxic-appearing or diaphoretic. HENT:      Head: Normocephalic and atraumatic. Eyes:      General: No scleral icterus. Right eye: No discharge. Left eye: No discharge. Extraocular Movements: Extraocular movements intact. Conjunctiva/sclera: Conjunctivae normal.   Cardiovascular:      Rate and Rhythm: Normal rate and regular rhythm. Pulses: Normal pulses. Heart sounds: Normal heart sounds. No murmur heard. No friction rub. No gallop. Pulmonary:      Effort: Pulmonary effort is normal. No respiratory distress. Breath sounds: Normal breath sounds. No stridor. No wheezing, rhonchi or rales. Chest:      Chest wall: No tenderness. Musculoskeletal:      Left knee: Decreased range of motion. Tenderness present. Neurological:      Mental Status: She is alert and oriented to person, place, and time. Mental status is at baseline.    Psychiatric: Mood and Affect: Mood normal.         Behavior: Behavior normal.         Thought Content: Thought content normal.         Judgment: Judgment normal.       Labs/Imaging/Diagnostics   Labs:  Hemoglobin A1C   Date Value Ref Range Status   10/26/2022 7.5 (H) 4.0 - 6.0 % Final       Imaging Last 24 Hours:  [facility] [place] DIGITAL SCREEN BILATERAL  Narrative: EXAMINATION:  SCREENING DIGITAL BILATERAL  MAMMOGRAM WITH TOMOSYNTHESIS, 4/27/2021    TECHNIQUE:  Screening mammography was performed with tomosynthesis including MLO and CC  views of the bilateral breasts. Computer aided detection was used for the  interpretation of this exam.    COMPARISON:  Mammogram dated 11/20/2019, 07/17/2018, 06/18/2014, 07/11/2012    HISTORY:  Screening. FINDINGS:  There are scattered areas of fibroglandular density. There is no suspicious  mass, architectural distortion, or calcification. Impression: No mammographic findings of malignancy. BI-RADS 1    BIRADS - CATEGORY 1    Negative, no evidence of malignancy. Normal interval follow-up is  recommended in 12 months. OVERALL ASSESSMENT - NEGATIVE    A letter of notification will be sent to the patient regarding the results. The [employer] of Radiology recommends annual mammograms for women 40  years and older.

## 2022-10-27 NOTE — PATIENT INSTRUCTIONS
Patient Education        Counting Carbohydrates for Diabetes: Care Instructions  Overview     Managing the amount of carbohydrate (carbs) you eat is an important part of planning healthy meals when you have diabetes. Carbs raise blood sugar more than any other nutrient. Carbs are found in grains, starchy vegetables, fruits, and milk and yogurt. Carbs are also found in sugar-sweetened foods and drinks. The more carbs you eat at one time, the higher your blood sugar will rise. Counting carbs can help you keep your blood sugar within your target range. If you use insulin, counting carbs helps you match the right amount of insulin to the number of grams of carbs in a meal.  A registered dietitian or diabetes educator can help you plan meals and snacks. Follow-up care is a key part of your treatment and safety. Be sure to make and go to all appointments, and call your doctor if you are having problems. It's also a good idea to know your test results and keep a list of the medicines you take. How can you care for yourself at home? Know your daily amount of carbohydrates  Your daily amount depends on several things, such as your weight, how active you are, which diabetes medicines you take, and what your goals are for your blood sugar levels. A registered dietitian or diabetes educator can help you plan how many carbs to include in each meal and snack. For most adults, a guideline for the daily amount of carbs is:  45 to 60 grams at each meal. That's about the same as 3 to 4 carbohydrate servings. 15 to 20 grams at each snack. That's about the same as 1 carbohydrate serving. Count carbs  Counting carbs lets you know how much rapid-acting insulin to take before you eat. If you use an insulin pump, you get a constant rate of insulin during the day. So the pump must be programmed at meals. This gives you extra insulin to cover the rise in blood sugar after meals.   If you take insulin:  Learn your own insulin-to-carb ratio. You and your diabetes health professional will figure out the ratio. You can do this by testing your blood sugar after meals. For example, you may need a certain amount of insulin for every 15 grams of carbs. Add up the carb grams in a meal. Then you can figure out how many units of insulin to take based on your insulin-to-carb ratio. Exercise lowers blood sugar. You can use less insulin than you would if you were not doing exercise. Keep in mind that timing matters. If you exercise within 1 hour after a meal, your body may need less insulin for that meal than it would if you exercised 3 hours after the meal. Test your blood sugar to find out how exercise affects your need for insulin. If you do or don't take insulin:  Look at labels on packaged foods. This can tell you how many carbs are in a serving. Be aware of portions, or serving sizes. If a package has two servings and you eat the whole package, you need to double the number of grams of carbohydrate listed for one serving. Protein, fat, and fiber do not raise blood sugar as much as carbs do. If you eat a lot of these nutrients in a meal, your blood sugar will rise more slowly than it would otherwise. Where can you learn more? Go to https://[employer].Clinked. org and sign in to your [employer] account. Enter A536 in the [employer] box to learn more about \"Counting Carbohydrates for Diabetes: Care Instructions. \"     If you do not have an account, please click on the \"Sign Up Now\" link. Current as of: May 9, 2022               Content Version: 13.4  © 2006-2022 [employer]. Care instructions adapted under license by [facility] ([facility]). If you have questions about a medical condition or this instruction, always ask your healthcare professional. [place] [address] any warranty or liability for your use of this information.

## 2022-10-27 NOTE — ASSESSMENT & PLAN NOTE
Borderline controlled, changes made today: increase Metformin to 1500mg dialy, medication adherence emphasized and lifestyle modifications recommended

## 2022-11-18 DIAGNOSIS — E11.9 TYPE 2 DIABETES MELLITUS WITHOUT COMPLICATION, WITHOUT LONG-TERM CURRENT USE OF INSULIN (HCC): ICD-10-CM

## 2022-11-18 NOTE — TELEPHONE ENCOUNTER
Zoe Haley is calling to request a refill on the following medication(s):    Medication Request:  Requested Prescriptions     Pending Prescriptions Disp Refills    metFORMIN (GLUCOPHAGE-XR) 500 MG extended release tablet 180 tablet 3     Sig: Take 2 tablets by mouth every morning (before breakfast) AND 1 tablet Daily with supper.        Last Visit Date (If Applicable):  71/58/8547    Next Visit Date:    5/1/2023

## 2022-11-20 RX ORDER — METFORMIN HYDROCHLORIDE 500 MG/1
TABLET, EXTENDED RELEASE ORAL
Qty: 180 TABLET | Refills: 3 | Status: SHIPPED | OUTPATIENT
Start: 2022-11-20

## 2023-01-30 DIAGNOSIS — M25.562 LEFT KNEE PAIN, UNSPECIFIED CHRONICITY: Primary | ICD-10-CM

## 2023-02-02 ENCOUNTER — OFFICE VISIT (OUTPATIENT)
Dept: ORTHOPEDIC SURGERY | Age: 68
End: 2023-02-02

## 2023-02-02 VITALS — BODY MASS INDEX: 30.82 KG/M2 | RESPIRATION RATE: 16 BRPM | HEIGHT: 65 IN | WEIGHT: 185 LBS

## 2023-02-02 DIAGNOSIS — M17.12 PRIMARY OSTEOARTHRITIS OF LEFT KNEE: Primary | ICD-10-CM

## 2023-02-05 ASSESSMENT — ENCOUNTER SYMPTOMS
SHORTNESS OF BREATH: 0
ROS SKIN COMMENTS: NEGATIVE FOR RASH
ABDOMINAL PAIN: 0
EYE DISCHARGE: 0

## 2023-02-05 NOTE — PROGRESS NOTES
[address] [facility] ORTHOPEDICS AND SPORTS MEDICINE  [address] [facility]  [address] [address]  Dept: [phone]  Dept Fax: [phone]        Ambulatory Follow Up      Subjective:   Cirilo Thurston is a 78y.o. year old female who presents to our office today for routine followup regarding her   1. Primary osteoarthritis of left knee    . Chief Complaint   Patient presents with    Knee Pain     Left       HPI  Fide Peres is a 64-year-old female who presents the office today for evaluation of left knee pain. She has had a previous right total knee arthroplasty on 4/9/2019. She is very happy with that knee so far. She continues to have significant left knee pain and feels like she is going to fall at times. She is on a home exercise program especially when she was [place] her right knee and she has had multiple injections without significant improvement in her symptoms. Her symptoms are greatly affecting her usual activities of daily living. Review of Systems   Constitutional:  Positive for activity change. Negative for fever. HENT:  Negative for dental problem. Eyes:  Negative for discharge. Respiratory:  Negative for shortness of breath. Cardiovascular:  Negative for chest pain. Gastrointestinal:  Negative for abdominal pain. Genitourinary: Negative. Musculoskeletal:  Positive for arthralgias. Skin:         Negative for rash   Neurological:  Positive for weakness. Psychiatric/Behavioral:  Negative for confusion. I have reviewed the CC, HPI, ROS, PMH, FHX, Social History, and if not present in this note, I have reviewed in the patient's chart. I agree with the documentation provided by other staff and have reviewed their documentation prior to providing my signature indicating agreement.     Objective :   Resp 16   Ht 5' 5\" (1.651 m)   Wt 185 lb (83.9 kg)   BMI 30.79 kg/m²  Body mass index is 30.79 kg/m². General: Gali Haley is a 78 y.o. female who is alert and oriented and sitting comfortably in our office. Ortho Exam  MS:  Evaluation of the left knee reveals no significant outward deformity. There is no erythema, warmth, skin lesions, signs of infection. There is tenderness over the medial joint line. There is a mildknee effusion. Range of motion of the left knee is  full. No instability of the knee is appreciated at 0 and 30° of flexion. There is a negative anterior drawer Lachman's test.  There is increased pain with varus Ruben's testing. No calf tenderness is noted. There is a negative hip log roll and Stinchfield test.  Motor, sensory, vascular examination to the left lower extremity is intact. Patient has full range of motion of the ankle. Neuro: alert and oriented to person and place. Eyes: Extra-ocular muscles intact  Mouth: Oral mucosa moist. No perioral lesions  Pulm: Respirations unlabored and regular. Symmetric chest excursion without outward deformity is noted. Skin: warm, well perfused  Psych:   Patient has good fund of knowledge and displays understanging of exam, diagnosis, and plan. Radiology: XR KNEE LEFT (MIN 4 VIEWS)    Result Date: 2/2/2023  History:   Left knee pain Findings:   Standing AP/Lateral/Tunnel/Merchant view xrays of the Left done in the office today shows severe  medial joint space narrowing, tricompartmental osteophytosis, joint line sclerosis medially. No evidence of fracture, subluxation, dislocation, radioopaque foreign body/tumor is noted. Lateral subluxation of the tibia is appreciated. Impression:   Left knee severe  degenerative changes as described above. Assessment:      1. Primary osteoarthritis of left knee         Plan:      Patient has severe arthritis to the left knee that has failed to improve despite conservative therapy to include a structured home exercise program and corticosteroid injections.   Her symptoms are greatly affecting her usual activities of daily living and ability to ambulate and she would like to proceed with left total knee arthroplasty. Her A1c is approximately 7.5 and she is working on that. Otherwise she had a. She is okay to proceed with surgery and we will start the process for scheduling. All details of the procedure, as well as risks, benefits and alternatives, including the option of non operative versus operative treatment were discussed. The patient understands that risks of the surgery include but are not limited to: bleeding, malunion/nonunion, loss of fixation, loss of reduction, hardware failure, angular or rotational deformity, length discrepancy, limp, transfusion, skin blistering or breakdown, progressive post traumatic degenerative joint disease, possible need for further surgery, bone grafting, infection, nerve injury, paralysis, numbness, blood vessel injury, excessive scaring, wound complication or breakdown, failure of symptoms to improve or actual deterioration in condition, significant acute and/or chronic pain, possible need for amputation, permanent loss of motion, and permanent loss of function. As well as the general complications of anesthesia, which include but are not limited to: myocardial infarction and/or heart attack, stroke, multi organ system failure or even possible death, prolonged hospital stay, blood clots, pulmonary embolism, abnormal reaction to medication, visual and neurological disturbances, constipation, ischemic bowel, bowel obstruction, bowel perforation, ileus and mental status changes. No guarantees were made. Follow up:Return for Two weeks post op. No orders of the defined types were placed in this encounter. No orders of the defined types were placed in this encounter.       This note is created with the assistance of a speech recognition program.  While intending to generate a document that actually reflects the content of the visit, the document can still have some errors including those of syntax and sound a like substitutions which may escape proof reading.   In such instances, actual meaning can be extrapolated by contextual diversion      Electronically signed by Chapin Mcgraw DO, FAOAO on 2/5/2023 at 10:20 AM

## 2023-03-01 ENCOUNTER — HOSPITAL ENCOUNTER (OUTPATIENT)
Dept: MAMMOGRAPHY | Age: 68
Discharge: HOME | End: 2023-03-03
Payer: MEDICARE

## 2023-03-01 DIAGNOSIS — Z12.31 ENCOUNTER FOR SCREENING MAMMOGRAM FOR MALIGNANT NEOPLASM OF BREAST: ICD-10-CM

## 2023-03-01 PROCEDURE — 77067 SCR MAMMO BI INCL CAD: CPT

## 2023-03-13 DIAGNOSIS — E78.5 DYSLIPIDEMIA: ICD-10-CM

## 2023-03-14 DIAGNOSIS — Z01.818 PRE-OP TESTING: ICD-10-CM

## 2023-03-14 DIAGNOSIS — M17.12 PRIMARY OSTEOARTHRITIS OF LEFT KNEE: ICD-10-CM

## 2023-03-14 RX ORDER — ATORVASTATIN CALCIUM 40 MG/1
TABLET, FILM COATED ORAL
Qty: 90 TABLET | Refills: 3 | Status: SHIPPED | OUTPATIENT
Start: 2023-03-14

## 2023-03-14 NOTE — TELEPHONE ENCOUNTER
Clarice Haley is calling to request a refill on the following medication(s):    Last Visit Date (If Applicable):  95/21/7837    Next Visit Date:    5/1/2023    Medication Request:  Requested Prescriptions     Pending Prescriptions Disp Refills    atorvastatin (LIPITOR) 40 MG tablet [Pharmacy Med Name: ATORVASTATIN TABS 40MG] 90 tablet 3     Sig: TAKE 1 TABLET DAILY

## 2023-04-24 ENCOUNTER — OFFICE VISIT (OUTPATIENT)
Dept: FAMILY MEDICINE CLINIC | Age: 68
End: 2023-04-24
Payer: MEDICARE

## 2023-04-24 VITALS
DIASTOLIC BLOOD PRESSURE: 80 MMHG | HEART RATE: 99 BPM | WEIGHT: 184 LBS | BODY MASS INDEX: 30.62 KG/M2 | SYSTOLIC BLOOD PRESSURE: 118 MMHG | OXYGEN SATURATION: 99 % | TEMPERATURE: 97.5 F

## 2023-04-24 DIAGNOSIS — M17.12 PRIMARY OSTEOARTHRITIS OF LEFT KNEE: ICD-10-CM

## 2023-04-24 DIAGNOSIS — E11.9 TYPE 2 DIABETES MELLITUS WITHOUT COMPLICATION, WITHOUT LONG-TERM CURRENT USE OF INSULIN (HCC): ICD-10-CM

## 2023-04-24 DIAGNOSIS — Z98.890 STATUS POST SURGERY: Primary | ICD-10-CM

## 2023-04-24 DIAGNOSIS — Z01.818 PREOPERATIVE CLEARANCE: Primary | ICD-10-CM

## 2023-04-24 PROCEDURE — 99213 OFFICE O/P EST LOW 20 MIN: CPT

## 2023-04-24 PROCEDURE — 3051F HG A1C>EQUAL 7.0%<8.0%: CPT

## 2023-04-24 PROCEDURE — 1123F ACP DISCUSS/DSCN MKR DOCD: CPT

## 2023-04-24 SDOH — ECONOMIC STABILITY: FOOD INSECURITY: WITHIN THE PAST 12 MONTHS, YOU WORRIED THAT YOUR FOOD WOULD RUN OUT BEFORE YOU GOT MONEY TO BUY MORE.: NEVER TRUE

## 2023-04-24 SDOH — ECONOMIC STABILITY: INCOME INSECURITY: HOW HARD IS IT FOR YOU TO PAY FOR THE VERY BASICS LIKE FOOD, HOUSING, MEDICAL CARE, AND HEATING?: NOT HARD AT ALL

## 2023-04-24 SDOH — ECONOMIC STABILITY: FOOD INSECURITY: WITHIN THE PAST 12 MONTHS, THE FOOD YOU BOUGHT JUST DIDN'T LAST AND YOU DIDN'T HAVE MONEY TO GET MORE.: NEVER TRUE

## 2023-04-24 SDOH — ECONOMIC STABILITY: HOUSING INSECURITY
IN THE LAST 12 MONTHS, WAS THERE A TIME WHEN YOU DID NOT HAVE A STEADY PLACE TO SLEEP OR SLEPT IN A SHELTER (INCLUDING NOW)?: NO

## 2023-04-24 ASSESSMENT — PATIENT HEALTH QUESTIONNAIRE - PHQ9
SUM OF ALL RESPONSES TO PHQ9 QUESTIONS 1 & 2: 0
SUM OF ALL RESPONSES TO PHQ QUESTIONS 1-9: 0
SUM OF ALL RESPONSES TO PHQ QUESTIONS 1-9: 0
2. FEELING DOWN, DEPRESSED OR HOPELESS: 0
SUM OF ALL RESPONSES TO PHQ QUESTIONS 1-9: 0
SUM OF ALL RESPONSES TO PHQ QUESTIONS 1-9: 0
1. LITTLE INTEREST OR PLEASURE IN DOING THINGS: 0

## 2023-04-24 ASSESSMENT — ENCOUNTER SYMPTOMS
COLOR CHANGE: 0
COUGH: 0
DIARRHEA: 0
WHEEZING: 0
ABDOMINAL PAIN: 0
NAUSEA: 0
CHEST TIGHTNESS: 0
EYE DISCHARGE: 0
SORE THROAT: 0
SHORTNESS OF BREATH: 0
VOMITING: 0
CONSTIPATION: 0

## 2023-04-24 NOTE — PROGRESS NOTES
[address]  Dept: [phone]    Marta Fabry is a 78 y.o. female who presents for a preoperative physical examination. She is scheduled to have left total knee done by Dr. Alexia Abarca at [facility] on 5/2/23. Any recent illnesses that will interfere with the upcoming surgery? Recent URI, improved  Last surgical procedure - right total knee 2019. Type of anesthesia used general. Problems with the procedure or anesthesia? none    Conditioning (equivalent to 4 METs) --  1. Can you walk up 2 flights of stairs? yes  2. Can you run a 'short distance'? Limited d/t knee  3. Can you walk up a hill 1-2 blocks? yes  4. Can you carry 2 bags of groceries up 1 flight of stairs? No because of knee  5. Can you walk 2-4 blocks (flat)? yes    Past Medical History:   Diagnosis Date    Arthritis     BILATERAL KNEES    Gestational diabetes     FIRST PREGNANCY ONLY     History of total right knee replacement 2019    Hyperlipidemia     Type 2 diabetes mellitus ([facility] [address] 06/2018    Wears glasses       Past Surgical History:   Procedure Laterality Date    COLONOSCOPY      REPLACEMENT TOTAL KNEE Right 04/09/2019    NAVIO TOTAL KNEE, ZACARIAS AND NEPHEW *ADVANCED RIGHT performed by Kenney Martinez DO at [facility]       Family History   Problem Relation Age of Onset    Cancer Mother         pancreas    Heart Disease Mother         tachycardia; ? diagnosis    High Blood Pressure Mother     Cancer Father         thyroid, COLON    Diabetes Father     Cancer Maternal Uncle         pancreas; bladder;     Heart Disease Maternal Uncle         hearth failure, ?cause    Cancer Paternal Uncle         lung in smoker    Cancer Paternal Grandfather         esophageal       Social History     Tobacco Use    Smoking status: Never    Smokeless tobacco: Never   Substance Use Topics    Alcohol use:  Yes     Alcohol/week: 2.0 standard drinks     Types: 2 Glasses of wine per week     Comment: ONCE

## 2023-04-25 ENCOUNTER — HOSPITAL ENCOUNTER (OUTPATIENT)
Age: 68
Setting detail: SPECIMEN
Discharge: HOME | End: 2023-04-25

## 2023-04-25 DIAGNOSIS — E78.5 DYSLIPIDEMIA: ICD-10-CM

## 2023-04-25 DIAGNOSIS — E11.9 TYPE 2 DIABETES MELLITUS WITHOUT COMPLICATION, WITHOUT LONG-TERM CURRENT USE OF INSULIN (HCC): ICD-10-CM

## 2023-04-25 LAB
CHOLEST SERPL-MCNC: 196 MG/DL
CHOLESTEROL/HDL RATIO: 4.4
CREATININE URINE: 178.4 MG/DL (ref 28–217)
HDLC SERPL-MCNC: 45 MG/DL
LDLC SERPL CALC-MCNC: 118 MG/DL (ref 0–130)
MICROALBUMIN/CREAT 24H UR: 18 MG/L
MICROALBUMIN/CREAT UR-RTO: 10 MCG/MG CREAT
TRIGL SERPL-MCNC: 163 MG/DL

## 2023-04-29 SDOH — ECONOMIC STABILITY: INCOME INSECURITY: HOW HARD IS IT FOR YOU TO PAY FOR THE VERY BASICS LIKE FOOD, HOUSING, MEDICAL CARE, AND HEATING?: NOT HARD AT ALL

## 2023-04-29 SDOH — ECONOMIC STABILITY: FOOD INSECURITY: WITHIN THE PAST 12 MONTHS, YOU WORRIED THAT YOUR FOOD WOULD RUN OUT BEFORE YOU GOT MONEY TO BUY MORE.: NEVER TRUE

## 2023-04-29 SDOH — ECONOMIC STABILITY: FOOD INSECURITY: WITHIN THE PAST 12 MONTHS, THE FOOD YOU BOUGHT JUST DIDN'T LAST AND YOU DIDN'T HAVE MONEY TO GET MORE.: NEVER TRUE

## 2023-05-01 ENCOUNTER — OFFICE VISIT (OUTPATIENT)
Dept: FAMILY MEDICINE CLINIC | Age: 68
End: 2023-05-01
Payer: MEDICARE

## 2023-05-01 VITALS
DIASTOLIC BLOOD PRESSURE: 70 MMHG | BODY MASS INDEX: 30.45 KG/M2 | HEART RATE: 72 BPM | OXYGEN SATURATION: 96 % | WEIGHT: 183 LBS | SYSTOLIC BLOOD PRESSURE: 110 MMHG

## 2023-05-01 DIAGNOSIS — E11.9 TYPE 2 DIABETES MELLITUS WITHOUT COMPLICATION, WITHOUT LONG-TERM CURRENT USE OF INSULIN (HCC): Primary | ICD-10-CM

## 2023-05-01 DIAGNOSIS — E78.5 DYSLIPIDEMIA: ICD-10-CM

## 2023-05-01 DIAGNOSIS — M17.12 PRIMARY OSTEOARTHRITIS OF LEFT KNEE: ICD-10-CM

## 2023-05-01 PROCEDURE — 1123F ACP DISCUSS/DSCN MKR DOCD: CPT | Performed by: FAMILY MEDICINE

## 2023-05-01 PROCEDURE — 3051F HG A1C>EQUAL 7.0%<8.0%: CPT | Performed by: FAMILY MEDICINE

## 2023-05-01 PROCEDURE — 99214 OFFICE O/P EST MOD 30 MIN: CPT | Performed by: FAMILY MEDICINE

## 2023-05-01 NOTE — PATIENT INSTRUCTIONS
shower by putting the weaker leg in first. Get out of a tub or shower with your strong side first.  Repair loose toilet seats and consider installing a raised toilet seat to make getting on and off the toilet easier. Keep your bathroom door unlocked while you are in the shower. Where can you learn more? Go to http://www.silverman.com/ and enter G117 to learn more about \"Preventing Falls: Care Instructions. \"  Current as of: November 9, 2022               Content Version: 13.6  © 9617-0460 [employer]wise, Incorporated. Care instructions adapted under license by [facility] ([facility]). If you have questions about a medical condition or this instruction, always ask your healthcare professional. [address] any warranty or liability for your use of this information.

## 2023-05-02 ENCOUNTER — APPOINTMENT (OUTPATIENT)
Dept: GENERAL RADIOLOGY | Age: 68
End: 2023-05-02
Attending: ORTHOPAEDIC SURGERY
Payer: MEDICARE

## 2023-05-02 ENCOUNTER — ANESTHESIA (OUTPATIENT)
Dept: OPERATING ROOM | Age: 68
End: 2023-05-02
Payer: MEDICARE

## 2023-05-02 ENCOUNTER — HOSPITAL ENCOUNTER (OUTPATIENT)
Age: 68
Discharge: HOME | End: 2023-05-02
Attending: ORTHOPAEDIC SURGERY | Admitting: ORTHOPAEDIC SURGERY
Payer: MEDICARE

## 2023-05-02 ENCOUNTER — ANESTHESIA EVENT (OUTPATIENT)
Dept: OPERATING ROOM | Age: 68
End: 2023-05-02
Payer: MEDICARE

## 2023-05-02 VITALS
HEIGHT: 65 IN | HEART RATE: 91 BPM | WEIGHT: 185.19 LBS | DIASTOLIC BLOOD PRESSURE: 86 MMHG | OXYGEN SATURATION: 95 % | RESPIRATION RATE: 16 BRPM | TEMPERATURE: 98.1 F | SYSTOLIC BLOOD PRESSURE: 154 MMHG | BODY MASS INDEX: 30.85 KG/M2

## 2023-05-02 DIAGNOSIS — G89.18 POST-OP PAIN: Primary | ICD-10-CM

## 2023-05-02 PROBLEM — Z96.652 S/P TOTAL KNEE ARTHROPLASTY, LEFT: Status: ACTIVE | Noted: 2023-05-02

## 2023-05-02 LAB
GLUCOSE BLD-MCNC: 144 MG/DL (ref 65–105)
GLUCOSE BLD-MCNC: 156 MG/DL (ref 65–105)
GLUCOSE BLD-MCNC: 198 MG/DL (ref 65–105)
GLUCOSE BLD-MCNC: 258 MG/DL (ref 65–105)

## 2023-05-02 PROCEDURE — 3700000000 HC ANESTHESIA ATTENDED CARE: Performed by: ORTHOPAEDIC SURGERY

## 2023-05-02 PROCEDURE — 82947 ASSAY GLUCOSE BLOOD QUANT: CPT

## 2023-05-02 PROCEDURE — 73562 X-RAY EXAM OF KNEE 3: CPT

## 2023-05-02 PROCEDURE — 7100000001 HC PACU RECOVERY - ADDTL 15 MIN: Performed by: ORTHOPAEDIC SURGERY

## 2023-05-02 PROCEDURE — 97530 THERAPEUTIC ACTIVITIES: CPT

## 2023-05-02 PROCEDURE — 2580000003 HC RX 258: Performed by: STUDENT IN AN ORGANIZED HEALTH CARE EDUCATION/TRAINING PROGRAM

## 2023-05-02 PROCEDURE — 97162 PT EVAL MOD COMPLEX 30 MIN: CPT

## 2023-05-02 PROCEDURE — 97116 GAIT TRAINING THERAPY: CPT

## 2023-05-02 PROCEDURE — 2500000003 HC RX 250 WO HCPCS: Performed by: NURSE ANESTHETIST, CERTIFIED REGISTERED

## 2023-05-02 PROCEDURE — C1713 ANCHOR/SCREW BN/BN,TIS/BN: HCPCS | Performed by: ORTHOPAEDIC SURGERY

## 2023-05-02 PROCEDURE — 3700000001 HC ADD 15 MINUTES (ANESTHESIA): Performed by: ORTHOPAEDIC SURGERY

## 2023-05-02 PROCEDURE — 6360000002 HC RX W HCPCS: Performed by: ANESTHESIOLOGY

## 2023-05-02 PROCEDURE — 7100000000 HC PACU RECOVERY - FIRST 15 MIN: Performed by: ORTHOPAEDIC SURGERY

## 2023-05-02 PROCEDURE — 6370000000 HC RX 637 (ALT 250 FOR IP)

## 2023-05-02 PROCEDURE — 6360000002 HC RX W HCPCS: Performed by: ORTHOPAEDIC SURGERY

## 2023-05-02 PROCEDURE — 97110 THERAPEUTIC EXERCISES: CPT

## 2023-05-02 PROCEDURE — 3600000005 HC SURGERY LEVEL 5 BASE: Performed by: ORTHOPAEDIC SURGERY

## 2023-05-02 PROCEDURE — 64447 NJX AA&/STRD FEMORAL NRV IMG: CPT | Performed by: ANESTHESIOLOGY

## 2023-05-02 PROCEDURE — 6360000002 HC RX W HCPCS: Performed by: NURSE ANESTHETIST, CERTIFIED REGISTERED

## 2023-05-02 PROCEDURE — 2500000003 HC RX 250 WO HCPCS: Performed by: ANESTHESIOLOGY

## 2023-05-02 PROCEDURE — 2580000003 HC RX 258: Performed by: ORTHOPAEDIC SURGERY

## 2023-05-02 PROCEDURE — C1776 JOINT DEVICE (IMPLANTABLE): HCPCS | Performed by: ORTHOPAEDIC SURGERY

## 2023-05-02 PROCEDURE — 3600000015 HC SURGERY LEVEL 5 ADDTL 15MIN: Performed by: ORTHOPAEDIC SURGERY

## 2023-05-02 PROCEDURE — 97166 OT EVAL MOD COMPLEX 45 MIN: CPT

## 2023-05-02 PROCEDURE — 97535 SELF CARE MNGMENT TRAINING: CPT

## 2023-05-02 PROCEDURE — 6370000000 HC RX 637 (ALT 250 FOR IP): Performed by: ANESTHESIOLOGY

## 2023-05-02 PROCEDURE — 2709999900 HC NON-CHARGEABLE SUPPLY: Performed by: ORTHOPAEDIC SURGERY

## 2023-05-02 PROCEDURE — C9290 INJ, BUPIVACAINE LIPOSOME: HCPCS | Performed by: ANESTHESIOLOGY

## 2023-05-02 PROCEDURE — 2720000010 HC SURG SUPPLY STERILE: Performed by: ORTHOPAEDIC SURGERY

## 2023-05-02 DEVICE — CEMENT BNE 40GM HI VISC RADPQ FOR REV SURG: Type: IMPLANTABLE DEVICE | Site: KNEE | Status: FUNCTIONAL

## 2023-05-02 DEVICE — JOURNEY II INSERT XLPE DEEP DISHED LEFT SIZE 3-4 9MM
Type: IMPLANTABLE DEVICE | Site: KNEE | Status: FUNCTIONAL
Brand: JOURNEY

## 2023-05-02 DEVICE — JOURNEY TIBIAL BASEPLATE NONPOROUS                                    LEFT SIZE 3
Type: IMPLANTABLE DEVICE | Site: KNEE | Status: FUNCTIONAL
Brand: JOURNEY

## 2023-05-02 DEVICE — JOURNEY II CR FEMORAL OXINIUM NONPOROUS LEFT SIZE 5
Type: IMPLANTABLE DEVICE | Site: KNEE | Status: FUNCTIONAL
Brand: JOURNEY

## 2023-05-02 RX ORDER — SODIUM CHLORIDE, SODIUM LACTATE, POTASSIUM CHLORIDE, CALCIUM CHLORIDE 600; 310; 30; 20 MG/100ML; MG/100ML; MG/100ML; MG/100ML
INJECTION, SOLUTION INTRAVENOUS CONTINUOUS
Status: DISCONTINUED | OUTPATIENT
Start: 2023-05-02 | End: 2023-05-02

## 2023-05-02 RX ORDER — ATORVASTATIN CALCIUM 40 MG/1
40 TABLET, FILM COATED ORAL DAILY
Status: CANCELLED | OUTPATIENT
Start: 2023-05-02

## 2023-05-02 RX ORDER — OXYCODONE HYDROCHLORIDE AND ACETAMINOPHEN 5; 325 MG/1; MG/1
1-2 TABLET ORAL EVERY 6 HOURS PRN
Qty: 56 TABLET | Refills: 0 | Status: SHIPPED | OUTPATIENT
Start: 2023-05-02 | End: 2023-05-09

## 2023-05-02 RX ORDER — SODIUM CHLORIDE 0.9 % (FLUSH) 0.9 %
5-40 SYRINGE (ML) INJECTION EVERY 12 HOURS SCHEDULED
Status: DISCONTINUED | OUTPATIENT
Start: 2023-05-02 | End: 2023-05-02 | Stop reason: HOSPADM

## 2023-05-02 RX ORDER — CELECOXIB 200 MG/1
200 CAPSULE ORAL ONCE
Status: COMPLETED | OUTPATIENT
Start: 2023-05-02 | End: 2023-05-02

## 2023-05-02 RX ORDER — BUPIVACAINE HYDROCHLORIDE 7.5 MG/ML
INJECTION, SOLUTION INTRASPINAL PRN
Status: DISCONTINUED | OUTPATIENT
Start: 2023-05-02 | End: 2023-05-02 | Stop reason: SDUPTHER

## 2023-05-02 RX ORDER — DIPHENHYDRAMINE HYDROCHLORIDE 50 MG/ML
12.5 INJECTION INTRAMUSCULAR; INTRAVENOUS
Status: DISCONTINUED | OUTPATIENT
Start: 2023-05-02 | End: 2023-05-02 | Stop reason: HOSPADM

## 2023-05-02 RX ORDER — ALOGLIPTIN 25 MG/1
25 TABLET, FILM COATED ORAL DAILY
Status: CANCELLED | OUTPATIENT
Start: 2023-05-02

## 2023-05-02 RX ORDER — ASPIRIN 81 MG/1
81 TABLET ORAL 2 TIMES DAILY
Qty: 84 TABLET | Refills: 0 | Status: SHIPPED | OUTPATIENT
Start: 2023-05-02 | End: 2023-06-13

## 2023-05-02 RX ORDER — SODIUM CHLORIDE 9 MG/ML
INJECTION, SOLUTION INTRAVENOUS PRN
Status: DISCONTINUED | OUTPATIENT
Start: 2023-05-02 | End: 2023-05-02 | Stop reason: HOSPADM

## 2023-05-02 RX ORDER — SODIUM CHLORIDE 0.9 % (FLUSH) 0.9 %
5-40 SYRINGE (ML) INJECTION PRN
Status: DISCONTINUED | OUTPATIENT
Start: 2023-05-02 | End: 2023-05-02 | Stop reason: HOSPADM

## 2023-05-02 RX ORDER — LIDOCAINE HYDROCHLORIDE 10 MG/ML
1 INJECTION, SOLUTION EPIDURAL; INFILTRATION; INTRACAUDAL; PERINEURAL
Status: DISCONTINUED | OUTPATIENT
Start: 2023-05-02 | End: 2023-05-02 | Stop reason: HOSPADM

## 2023-05-02 RX ORDER — FENTANYL CITRATE 50 UG/ML
50 INJECTION, SOLUTION INTRAMUSCULAR; INTRAVENOUS EVERY 5 MIN PRN
Status: DISCONTINUED | OUTPATIENT
Start: 2023-05-02 | End: 2023-05-02 | Stop reason: HOSPADM

## 2023-05-02 RX ORDER — ACETAMINOPHEN 500 MG
1000 TABLET ORAL ONCE
Status: COMPLETED | OUTPATIENT
Start: 2023-05-02 | End: 2023-05-02

## 2023-05-02 RX ORDER — FENTANYL CITRATE 50 UG/ML
INJECTION, SOLUTION INTRAMUSCULAR; INTRAVENOUS PRN
Status: DISCONTINUED | OUTPATIENT
Start: 2023-05-02 | End: 2023-05-02 | Stop reason: SDUPTHER

## 2023-05-02 RX ORDER — OXYCODONE HYDROCHLORIDE 5 MG/1
5 TABLET ORAL
Status: DISCONTINUED | OUTPATIENT
Start: 2023-05-02 | End: 2023-05-02 | Stop reason: HOSPADM

## 2023-05-02 RX ORDER — LIDOCAINE HYDROCHLORIDE 10 MG/ML
INJECTION, SOLUTION EPIDURAL; INFILTRATION; INTRACAUDAL; PERINEURAL PRN
Status: DISCONTINUED | OUTPATIENT
Start: 2023-05-02 | End: 2023-05-02 | Stop reason: SDUPTHER

## 2023-05-02 RX ORDER — CELECOXIB 200 MG/1
200 CAPSULE ORAL 2 TIMES DAILY
Status: DISCONTINUED | OUTPATIENT
Start: 2023-05-02 | End: 2023-05-02 | Stop reason: HOSPADM

## 2023-05-02 RX ORDER — DOCUSATE SODIUM 100 MG/1
100 CAPSULE, LIQUID FILLED ORAL 2 TIMES DAILY
Status: DISCONTINUED | OUTPATIENT
Start: 2023-05-02 | End: 2023-05-02 | Stop reason: HOSPADM

## 2023-05-02 RX ORDER — TRANEXAMIC ACID 100 MG/ML
INJECTION, SOLUTION INTRAVENOUS PRN
Status: DISCONTINUED | OUTPATIENT
Start: 2023-05-02 | End: 2023-05-02 | Stop reason: SDUPTHER

## 2023-05-02 RX ORDER — PROPOFOL 10 MG/ML
INJECTION, EMULSION INTRAVENOUS CONTINUOUS PRN
Status: DISCONTINUED | OUTPATIENT
Start: 2023-05-02 | End: 2023-05-02 | Stop reason: SDUPTHER

## 2023-05-02 RX ORDER — OXYCODONE HYDROCHLORIDE 5 MG/1
5 TABLET ORAL EVERY 4 HOURS PRN
Status: DISCONTINUED | OUTPATIENT
Start: 2023-05-02 | End: 2023-05-02 | Stop reason: HOSPADM

## 2023-05-02 RX ORDER — LIDOCAINE HYDROCHLORIDE 20 MG/ML
INJECTION, SOLUTION EPIDURAL; INFILTRATION; INTRACAUDAL; PERINEURAL PRN
Status: DISCONTINUED | OUTPATIENT
Start: 2023-05-02 | End: 2023-05-02 | Stop reason: SDUPTHER

## 2023-05-02 RX ORDER — OXYCODONE HYDROCHLORIDE 5 MG/1
10 TABLET ORAL EVERY 4 HOURS PRN
Status: DISCONTINUED | OUTPATIENT
Start: 2023-05-02 | End: 2023-05-02 | Stop reason: HOSPADM

## 2023-05-02 RX ORDER — ONDANSETRON 4 MG/1
4 TABLET, ORALLY DISINTEGRATING ORAL EVERY 8 HOURS PRN
Status: DISCONTINUED | OUTPATIENT
Start: 2023-05-02 | End: 2023-05-02 | Stop reason: HOSPADM

## 2023-05-02 RX ORDER — FENTANYL CITRATE 50 UG/ML
25 INJECTION, SOLUTION INTRAMUSCULAR; INTRAVENOUS EVERY 5 MIN PRN
Status: DISCONTINUED | OUTPATIENT
Start: 2023-05-02 | End: 2023-05-02 | Stop reason: HOSPADM

## 2023-05-02 RX ORDER — ACETAMINOPHEN 500 MG
1000 TABLET ORAL EVERY 6 HOURS PRN
Status: DISCONTINUED | OUTPATIENT
Start: 2023-05-02 | End: 2023-05-02 | Stop reason: HOSPADM

## 2023-05-02 RX ORDER — SODIUM CHLORIDE 9 MG/ML
INJECTION, SOLUTION INTRAVENOUS CONTINUOUS
Status: DISCONTINUED | OUTPATIENT
Start: 2023-05-02 | End: 2023-05-02

## 2023-05-02 RX ORDER — GABAPENTIN 300 MG/1
300 CAPSULE ORAL ONCE
Status: DISCONTINUED | OUTPATIENT
Start: 2023-05-02 | End: 2023-05-02 | Stop reason: HOSPADM

## 2023-05-02 RX ORDER — DEXAMETHASONE SODIUM PHOSPHATE 10 MG/ML
INJECTION, SOLUTION INTRAMUSCULAR; INTRAVENOUS PRN
Status: DISCONTINUED | OUTPATIENT
Start: 2023-05-02 | End: 2023-05-02 | Stop reason: SDUPTHER

## 2023-05-02 RX ORDER — POLYETHYLENE GLYCOL 3350 17 G/17G
17 POWDER, FOR SOLUTION ORAL DAILY PRN
Status: DISCONTINUED | OUTPATIENT
Start: 2023-05-02 | End: 2023-05-02 | Stop reason: HOSPADM

## 2023-05-02 RX ORDER — MIDAZOLAM HYDROCHLORIDE 1 MG/ML
2 INJECTION INTRAMUSCULAR; INTRAVENOUS ONCE
Status: COMPLETED | OUTPATIENT
Start: 2023-05-02 | End: 2023-05-02

## 2023-05-02 RX ORDER — ONDANSETRON 2 MG/ML
4 INJECTION INTRAMUSCULAR; INTRAVENOUS
Status: DISCONTINUED | OUTPATIENT
Start: 2023-05-02 | End: 2023-05-02 | Stop reason: HOSPADM

## 2023-05-02 RX ORDER — ASPIRIN 81 MG/1
81 TABLET, CHEWABLE ORAL 2 TIMES DAILY
Status: DISCONTINUED | OUTPATIENT
Start: 2023-05-03 | End: 2023-05-02 | Stop reason: HOSPADM

## 2023-05-02 RX ORDER — MAGNESIUM HYDROXIDE 1200 MG/15ML
LIQUID ORAL CONTINUOUS PRN
Status: COMPLETED | OUTPATIENT
Start: 2023-05-02 | End: 2023-05-02

## 2023-05-02 RX ORDER — PREGABALIN 75 MG/1
75 CAPSULE ORAL 2 TIMES DAILY
Status: DISCONTINUED | OUTPATIENT
Start: 2023-05-02 | End: 2023-05-02 | Stop reason: HOSPADM

## 2023-05-02 RX ORDER — METFORMIN HYDROCHLORIDE 500 MG/1
1000 TABLET, EXTENDED RELEASE ORAL
Status: CANCELLED | OUTPATIENT
Start: 2023-05-03

## 2023-05-02 RX ORDER — DOCUSATE SODIUM 100 MG/1
100 CAPSULE, LIQUID FILLED ORAL 2 TIMES DAILY PRN
Qty: 60 CAPSULE | Refills: 0 | Status: SHIPPED | OUTPATIENT
Start: 2023-05-02

## 2023-05-02 RX ORDER — VANCOMYCIN HYDROCHLORIDE 1 G/20ML
INJECTION, POWDER, LYOPHILIZED, FOR SOLUTION INTRAVENOUS
Status: DISCONTINUED
Start: 2023-05-02 | End: 2023-05-02 | Stop reason: HOSPADM

## 2023-05-02 RX ORDER — BUPIVACAINE HYDROCHLORIDE 5 MG/ML
INJECTION, SOLUTION EPIDURAL; INTRACAUDAL
Status: COMPLETED | OUTPATIENT
Start: 2023-05-02 | End: 2023-05-02

## 2023-05-02 RX ORDER — VANCOMYCIN HYDROCHLORIDE 1 G/20ML
INJECTION, POWDER, LYOPHILIZED, FOR SOLUTION INTRAVENOUS PRN
Status: DISCONTINUED | OUTPATIENT
Start: 2023-05-02 | End: 2023-05-02 | Stop reason: ALTCHOICE

## 2023-05-02 RX ORDER — ONDANSETRON 2 MG/ML
4 INJECTION INTRAMUSCULAR; INTRAVENOUS EVERY 6 HOURS PRN
Status: DISCONTINUED | OUTPATIENT
Start: 2023-05-02 | End: 2023-05-02 | Stop reason: HOSPADM

## 2023-05-02 RX ADMIN — OXYCODONE 10 MG: 5 TABLET ORAL at 17:53

## 2023-05-02 RX ADMIN — TRANEXAMIC ACID 1000 MG: 100 INJECTION, SOLUTION INTRAVENOUS at 11:35

## 2023-05-02 RX ADMIN — BUPIVACAINE 10 ML: 13.3 INJECTION, SUSPENSION, LIPOSOMAL INFILTRATION at 09:16

## 2023-05-02 RX ADMIN — TRANEXAMIC ACID 1000 MG: 100 INJECTION, SOLUTION INTRAVENOUS at 10:42

## 2023-05-02 RX ADMIN — Medication 2000 MG: at 10:35

## 2023-05-02 RX ADMIN — BUPIVACAINE HYDROCHLORIDE 10 ML: 5 INJECTION, SOLUTION EPIDURAL; INTRACAUDAL; PERINEURAL at 09:16

## 2023-05-02 RX ADMIN — DEXAMETHASONE SODIUM PHOSPHATE 10 MG: 10 INJECTION, SOLUTION INTRAMUSCULAR; INTRAVENOUS at 10:36

## 2023-05-02 RX ADMIN — LIDOCAINE HYDROCHLORIDE 100 MG: 20 INJECTION, SOLUTION EPIDURAL; INFILTRATION; INTRACAUDAL; PERINEURAL at 10:28

## 2023-05-02 RX ADMIN — PROPOFOL 50 MCG/KG/MIN: 10 INJECTION, EMULSION INTRAVENOUS at 10:28

## 2023-05-02 RX ADMIN — DOCUSATE SODIUM 100 MG: 100 CAPSULE, LIQUID FILLED ORAL at 13:33

## 2023-05-02 RX ADMIN — PREGABALIN 75 MG: 75 CAPSULE ORAL at 13:33

## 2023-05-02 RX ADMIN — ACETAMINOPHEN 1000 MG: 500 TABLET ORAL at 09:06

## 2023-05-02 RX ADMIN — MIDAZOLAM 1 MG: 1 INJECTION INTRAMUSCULAR; INTRAVENOUS at 09:08

## 2023-05-02 RX ADMIN — SODIUM CHLORIDE, POTASSIUM CHLORIDE, SODIUM LACTATE AND CALCIUM CHLORIDE: 600; 310; 30; 20 INJECTION, SOLUTION INTRAVENOUS at 10:20

## 2023-05-02 RX ADMIN — CELECOXIB 200 MG: 200 CAPSULE ORAL at 09:06

## 2023-05-02 RX ADMIN — FENTANYL CITRATE 25 MCG: 50 INJECTION INTRAMUSCULAR; INTRAVENOUS at 10:28

## 2023-05-02 RX ADMIN — LIDOCAINE HYDROCHLORIDE 3 ML: 10 INJECTION, SOLUTION EPIDURAL; INFILTRATION; INTRACAUDAL; PERINEURAL at 10:26

## 2023-05-02 RX ADMIN — FENTANYL CITRATE 50 MCG: 50 INJECTION, SOLUTION INTRAMUSCULAR; INTRAVENOUS at 12:24

## 2023-05-02 RX ADMIN — OXYCODONE HYDROCHLORIDE 5 MG: 5 TABLET ORAL at 13:36

## 2023-05-02 RX ADMIN — SODIUM CHLORIDE, POTASSIUM CHLORIDE, SODIUM LACTATE AND CALCIUM CHLORIDE: 600; 310; 30; 20 INJECTION, SOLUTION INTRAVENOUS at 11:58

## 2023-05-02 RX ADMIN — FENTANYL CITRATE 50 MCG: 50 INJECTION, SOLUTION INTRAMUSCULAR; INTRAVENOUS at 12:43

## 2023-05-02 RX ADMIN — BUPIVACAINE HYDROCHLORIDE IN DEXTROSE 1.6 ML: 7.5 INJECTION, SOLUTION SUBARACHNOID at 10:26

## 2023-05-02 ASSESSMENT — ENCOUNTER SYMPTOMS
SHORTNESS OF BREATH: 0
ALLERGIC/IMMUNOLOGIC NEGATIVE: 1
RESPIRATORY NEGATIVE: 1
SHORTNESS OF BREATH: 0
GASTROINTESTINAL NEGATIVE: 1
BLURRED VISION: 0
EYES NEGATIVE: 1
VISUAL CHANGE: 0

## 2023-05-02 ASSESSMENT — PAIN DESCRIPTION - LOCATION
LOCATION: KNEE

## 2023-05-02 ASSESSMENT — PAIN SCALES - GENERAL
PAINLEVEL_OUTOF10: 7
PAINLEVEL_OUTOF10: 0
PAINLEVEL_OUTOF10: 2
PAINLEVEL_OUTOF10: 5
PAINLEVEL_OUTOF10: 2
PAINLEVEL_OUTOF10: 7
PAINLEVEL_OUTOF10: 8

## 2023-05-02 ASSESSMENT — PAIN DESCRIPTION - DESCRIPTORS
DESCRIPTORS: ACHING

## 2023-05-02 ASSESSMENT — PAIN DESCRIPTION - ORIENTATION
ORIENTATION: LEFT

## 2023-05-02 ASSESSMENT — PAIN DESCRIPTION - ONSET
ONSET: ON-GOING
ONSET: ON-GOING

## 2023-05-02 ASSESSMENT — PAIN DESCRIPTION - PAIN TYPE
TYPE: SURGICAL PAIN

## 2023-05-02 ASSESSMENT — PAIN - FUNCTIONAL ASSESSMENT
PAIN_FUNCTIONAL_ASSESSMENT: PREVENTS OR INTERFERES SOME ACTIVE ACTIVITIES AND ADLS
PAIN_FUNCTIONAL_ASSESSMENT: PREVENTS OR INTERFERES SOME ACTIVE ACTIVITIES AND ADLS

## 2023-05-02 ASSESSMENT — PAIN DESCRIPTION - FREQUENCY
FREQUENCY: CONTINUOUS
FREQUENCY: CONTINUOUS

## 2023-05-02 NOTE — BRIEF OP NOTE
Brief Postoperative Note      Patient: Freedom Haley  YOB: 1955  MRN: [id number]    Date of Procedure: 5/2/2023    Pre-Op Diagnosis Codes:  Left knee osteoarthritis    Post-Op Diagnosis: Same       Procedure(s):  Left total knee arthroplasty    Surgeon(s):  Jayant Cavazos DO    Assistant:  First Assistant: Denice Esparza RN  Resident: Joanne Ortiz DO    Anesthesia: Spinal    Estimated Blood Loss (mL):  150 cc's    Fluids: 1,000 mL crystalloids     Complications: None    Specimens:   * No specimens in log *    Implants:  Implant Name Type Inv.  Item Serial No.  Lot No. LRB No. Used Action   CEMENT BNE 40GM HI VISC RADPQ FOR REV SURG - [id number]  CEMENT BNE 40GM HI VISC RADPQ FOR REV SURG  [employer] [id number] Left 1 Implanted   CEMENT BNE 40GM HI VISC RADPQ FOR REV SURG - [id number]  CEMENT BNE 40GM HI VISC RADPQ FOR REV SURG  [employer] [id number] Left 1 Implanted   COMPONENT JOURNY CR FEM OXINIUM LT SZ 6 - [id number]  COMPONENT JOURNY CR FEM OXINIUM LT SZ 6  [employer]- [id number] Left 1 Implanted   INSERT TIB SZ 3-4 THK9MM L KNEE XLPE DP [facility] II - [id number]  INSERT TIB SZ 3-4 THK9MM L KNEE XLPE DP [facility] AND NEPH ORTHOPAEDICS- [id number] Left 1 Implanted   BASEPLATE TIB SZ 3 [id number] ML68MM L KNEE NP [facility] - [id number]  BASEPLATE TIB SZ 3 [id number] ML68MM L KNEE NP [facility] AND NEPH [employer] [id number] Left 1 Implanted         Drains: * No LDAs found *    Findings: See op note for details      Electronically signed by Joanne Ortiz DO on 5/2/2023 at 12:10 PM

## 2023-05-02 NOTE — PROGRESS NOTES
Occupational Therapy  Facility/Department: [address] [place] [address]  Occupational Therapy Initial Assessment    Name: Terry Pabon  : 1955  MRN: [id number]  Date of Service: 2023  L TKA 2023  Discharge Recommendations: (planning on OP PT)     OT Equipment Recommendations  Equipment Needed: Yes  Mobility Devices: ADL Assistive Devices  ADL Assistive Devices: Shower Chair with back;Long-handled Sponge     RN reports patient is medically stable for therapy treatment this date. Chart reviewed prior to treatment and patient is agreeable for therapy. All lines intact and patient positioned comfortably at end of treatment. All patient needs addressed prior to ending therapy session. Patient Diagnosis(es): The encounter diagnosis was Post-op pain. Past Medical History:  has a past medical history of Arthritis, Gestational diabetes, History of total right knee replacement, Hyperlipidemia, Type 2 diabetes mellitus ([facility] [address] and Wears glasses. Past Surgical History:  has a past surgical history that includes REPLACEMENT TOTAL KNEE (Right, 2019) and Colonoscopy. Assessment   Performance deficits / Impairments: Decreased functional mobility ; Decreased ADL status; Decreased safe awareness;Decreased endurance;Decreased balance  Assessment: pt needing 2nd session prior to being safe for same day d/c. Pt unable to tolerate full treatment session to trial household distances of mob, toileting, and stair navigation to simulate entrance to house  Prognosis: Good  Decision Making: Medium Complexity  REQUIRES OT FOLLOW-UP: Yes  Activity Tolerance  Activity Tolerance: Patient Tolerated treatment well;Treatment limited secondary to medical complications (free text); Patient limited by pain  Activity Tolerance Comments: pt feeling nauseous and diaphoretic  (BP WFLs) and needing to rest after this session prior to 2nd session to trial stairs and further mob.         Plan   Occupational Therapy Plan  Times Per Week:
Occupational Therapy  Facility/Department: [facility] MED SURG  Rehabilitation Occupational Therapy Daily Treatment Note    Date: 23  Patient Name: Shad Haley       Room: 8  MRN: [id number]  Account: [de-identified]   : 1955  (69 y.o.) Gender: female           ANDREAS Guillen reports patient is medically stable for therapy treatment this date. Chart reviewed prior to treatment and patient is agreeable for therapy. All lines intact and patient positioned comfortably at end of treatment. All patient needs addressed prior to ending therapy session. Due to recent surgery, pt would benefit from additional intermittent skilled therapy at time of discharge. Please refer to the AM-PAC score for current functional status. Past Medical History:  has a past medical history of Arthritis, Gestational diabetes, History of total right knee replacement, Hyperlipidemia, Type 2 diabetes mellitus ([address] and Wears glasses. Past Surgical History:   has a past surgical history that includes REPLACEMENT TOTAL KNEE (Right, 2019) and Colonoscopy. Restrictions  Restrictions/Precautions: Fall Risk, General Precautions, Weight Bearing  Other position/activity restrictions: WBAT L LE, B thigh high stockings  Left Lower Extremity Weight Bearing: Weight Bearing As Tolerated    Subjective  Subjective: Pt resting in bed upon arrival agreeable to therapy. Restrictions/Precautions: Fall Risk;General Precautions;Weight Bearing             Objective     Cognition  Overall Cognitive Status: WNL  Orientation  Overall Orientation Status: Within Normal Limits         ADL  Lower Extremity Bathing  Assistance Level: Supervision;Set-up  Skilled Clinical Factors: While seated in chair pt was setup with wash cloths, towel, and surgical soap to wash all around surgical site to prevent infections.   Upper Extremity Dressing  Assistance Level: Supervision;Set-up  Skilled Clinical Factors: While seated in chair pt was able to
Orthopedic Coordinator Note    Patient s/p left total Knee replacement on 05/02/2023 with DR. Adrian Schulte. The following appointments are currently scheduled:    Post-op with surgeon 05/18/2023 AT 1000 IN [place]. Physical Therapy OPPT [facility] 05/15/2023 AT 1300. Wheeled walker order is not entered  Face to face documentation is N/A-PT HAS A FWW FROM [facility] 04/09/2019. DVT Prophylaxis: 81MG EC ASA BID X 14 DAYS.       Any questions please contact Layne Montalvo RN, MSN  [phone]      Electronically signed by: Layne Montalvo RN on 5/2/2023 at 9:24 AM
Patient discharged via wheelchair to home with all her belongings in stable condition.  Patient understood and signed AVS.
Pt admitted to room #2108 from PACU  Oriented to room and call light/tv controls. Bed in lowest position, wheels locked, 2/4 side rails up  Call light in reach, room free of clutter, adequate lighting provided.
6 visits:  Short Term Goal 1: Pt. To be indep. With bed mob. Using sheet as leg  for surgical LE as needed  Short Term Goal 2: Pt. To be indep. With transfers with safe hand placement with % of time  Short Term Goal 3: Pt. To be indep with gait with RW, 50 ft.  (Average household distance)  Short Term Goal 4: Pt. To safely negotiate  steps to prepare for entering home at discharge. Short Term Goal 5: Pt. To be indep with TKA HEP  Patient Goals   Patient Goals : ambulate    Education  Patient Education  Education Given To: Patient; Family  Education Provided: Role of Therapy;Plan of Care  Education Provided Comments: stair training  Barriers to Learning: None  Education Outcome: Verbalized understanding;Demonstrated understanding    Therapy Time   Individual Concurrent Group Co-treatment   Time In 1710         Time Out 1725         Minutes 1 [facility] [place]
Normal Limits  Cognition  Overall Cognitive Status: WNL     Objective   Pulse: 91  Heart Rate Source: Monitor  BP: (!) 154/86  BP Location: Right upper arm  Patient Position: Supine  MAP (Calculated): 109  Respirations: 16  SpO2: 95 %  O2 Device: None (Room air)     Observation/Palpation  Posture: Good  Observation: day of surgery eval                    Balance  Sitting: Intact  Standing: With support  Gait  Overall Level of Assistance: Minimum assistance;Assist X2 (pt min A x 2 initially with RW to assure safety with wt bearing as pt does report numbness. Pt does not have any episodes of buckling however and is able to walk around bed. Pt did c/o nausea and becoming hot/sweaty and needing to rest)  Interventions: Verbal cues; Safety awareness training;Demonstration (RW safety with mob.)  Assistive Device: Gait belt;Walker, rolling  Bed mobility  Supine to Sit: Contact guard assistance  Sit to Supine: Minimal assistance  Scooting: Contact guard assistance  Transfers  Sit to Stand: Minimal Assistance  Stand to Sit: Minimal Assistance  Ambulation  Surface: Level tile  Device: Rolling Walker  Assistance: Contact guard assistance  Quality of Gait: Once standing safely at bedside with RW, took pt. through pre-gait sequence to ensure quad control prior to ambulation. Assessed control of knee during static standing, wt. shifts and steps in place. Pt. With good quad control and able to proceed with ambulation. Sit to stands x2 EOB. After first stand where pt. took steps along EOB, became hot / clammy and needed to sit. After resting for several min and symptoms subsiding, had pt. amb. around bed. She again became hot/clammy and now had nausea. Took BP and was WNL. Returned pt. to supine and informed RN. Will return for second PT session today. Distance: side steps for 2 ft; amb. aroun bed, 15ft. Comments: Back to bed        Exercise Treatment: Issued pt. written TKA HEP of supine and seated ther ex.   Pt. with

## 2023-05-02 NOTE — PLAN OF CARE
Problem: Chronic Conditions and Co-morbidities  Goal: Patient's chronic conditions and co-morbidity symptoms are monitored and maintained or improved  Outcome: Progressing  Flowsheets (Taken 5/2/2023 134)  Care Plan - Patient's Chronic Conditions and Co-Morbidity Symptoms are Monitored and Maintained or Improved: Monitor and assess patient's chronic conditions and comorbid symptoms for stability, deterioration, or improvement     Problem: Discharge Planning  Goal: Discharge to home or other facility with appropriate resources  Outcome: Progressing  Flowsheets (Taken 5/2/2023 8327)  Discharge to home or other facility with appropriate resources:   Identify barriers to discharge with patient and caregiver   Identify discharge learning needs (meds, wound care, etc)   Refer to discharge planning if patient needs post-hospital services based on physician order or complex needs related to functional status, cognitive ability or social support system   Arrange for needed discharge resources and transportation as appropriate     Problem: Safety - Adult  Goal: Free from fall injury  Outcome: Progressing     Problem: Pain  Goal: Verbalizes/displays adequate comfort level or baseline comfort level  Outcome: Progressing     Problem: ABCDS Injury Assessment  Goal: Absence of physical injury  Outcome: Progressing

## 2023-05-02 NOTE — ANESTHESIA PROCEDURE NOTES
Spinal Block    End time: 5/2/2023 10:26 AM  Reason for block: primary anesthetic and at surgeon's request  Staffing  Performed: resident/CRNA   Anesthesiologist: Sara Shaikh MD  Resident/CRNA: CHRISTINA Lock CRNA  Spinal Block  Patient position: sitting  Prep: ChloraPrep  Patient monitoring: continuous pulse ox and frequent blood pressure checks  Approach: midline  Location: L4/L5  Guidance: paresthesia technique  Provider prep: mask and sterile gloves  Needle  Needle type: Pencan   Needle gauge: 24 G  Needle length: 4 in  Assessment  Sensory level: T10  Events: cerebrospinal fluid  Swirl obtained: Yes  CSF: clear  Attempts: 1  Hemodynamics: stable  Additional Notes  Pt tolerated procedure well, VSS  Preanesthetic Checklist  Completed: patient identified, IV checked, site marked, risks and benefits discussed, surgical/procedural consents, equipment checked, pre-op evaluation, timeout performed, anesthesia consent given, oxygen available, monitors applied/VS acknowledged, fire risk safety assessment completed and verbalized and blood product R/B/A discussed and consented

## 2023-05-02 NOTE — ANESTHESIA PRE PROCEDURE
Department of Anesthesiology  Preprocedure Note       Name:  Sharri Haley   Age:  78 y.o.  :  1955                                          MRN:  [id number]         Date:  2023      Surgeon: Mary Peres):  Luisa Donnelly DO    Procedure: Procedure(s):  LEFT KNEE TOTAL ARTHROPLASTY - S/N    Medications prior to admission:   Prior to Admission medications    Medication Sig Start Date End Date Taking? Authorizing Provider   atorvastatin (LIPITOR) 40 MG tablet TAKE 1 TABLET DAILY 3/14/23   Sea Dickey,    metFORMIN (GLUCOPHAGE-XR) 500 MG extended release tablet Take 2 tablets by mouth every morning (before breakfast) AND 1 tablet Daily with supper.  22   Sea Dickey DO   SITagliptin (JANUVIA) 100 MG tablet TAKE 1 TABLET DAILY 10/19/22   Sea Dickey DO   acetaminophen (TYLENOL) 325 MG tablet Take 2 tablets by mouth every 6 hours as needed    Historical Provider, MD       Current medications:    Current Facility-Administered Medications   Medication Dose Route Frequency Provider Last Rate Last Admin    lidocaine PF 1 % injection 1 mL  1 mL IntraDERmal Once PRN Sofia Dillon MD        lactated ringers IV soln infusion   IntraVENous Continuous Sofia Dillon MD        sodium chloride flush 0.9 % injection 5-40 mL  5-40 mL IntraVENous 2 times per day Sofia Dillon MD        sodium chloride flush 0.9 % injection 5-40 mL  5-40 mL IntraVENous PRN Sofia Dillon MD        0.9 % sodium chloride infusion   IntraVENous PRN Sofia Dillon MD        celecoxib (CELEBREX) capsule 200 mg  200 mg Oral Once Jaden Ferguson DO        gabapentin (NEURONTIN) capsule 300 mg  300 mg Oral Once Jaden Ferguson DO        acetaminophen (TYLENOL) tablet 1,000 mg  1,000 mg Oral Once Jaden Ferguson DO        ceFAZolin (ANCEF) 2000 mg in 0.9% sodium chloride 50 mL IVPB  2,000 mg IntraVENous Once Luisa Donnelly DO        bupivacaine liposome (EXPAREL) 1.3 % injection 133

## 2023-05-02 NOTE — DISCHARGE INSTRUCTIONS
recommended dosage. Ice, rest and elevating the surgical limb also help with pain control. When to call the Surgeon:  Increased redness, warmth, drainage, swelling or odor from incision site. Temperature above 101 degrees. Pain not controlled by prescribed medications. Calf tenderness, swelling, or redness. Shortness of breath or chest pain. If you cannot urinate and have been consuming liquids  Any incision or surgical-related concerns. Call surgeon with concerns PRIOR TO going to hospital.    Normal Conditions:  Swelling in the operative leg: this should reduce over time. Bruising behind the knee and around surgical area. Some post-operative pain. Constipation related to pain medications/decreased mobility. (Increase fiber & water intake.)   Slight warmth of operative leg. Fatigue and moderate pain after therapy. Numbness near the incision site. Nausea - take pain medications with food. Cut back on pain medication. NOTE: Remember to go to follow-up orthopedic appointment with surgeon      Keep it Clean - Post-Operative Home instructions    These instructions are to help you have the best possible recovery after your surgical procedure. Valeria Kessler is here to support you. If you have questions, call [phone] Monday through Friday from 7:30AM to 8:30PM to speak to a nurse. If you need to speak to someone outside of these hours, call your physician. Incision Dos and Donts  Do wash hands before and after dressing changes or when you have had any contact with your incision. Use hand  or antibacterial soap. Do keep your incision clean and dry. Its OK to wash the skin around your incision with mild soap and water. Do change your dressing as you were told. Do notify your doctor if the dressing becomes wet or dirty. Do use a clean washcloth every time when cleaning your incision. Do sleep on clean linens. Do keep pets away from incision site.   Dont sit in a bathtub,

## 2023-05-02 NOTE — PROGRESS NOTES
AP[facility] Progress Note    Date:5/2/2023         Patient Name:Ila Haley     YOB: 1955     Age:67 y.o. Assessment/Plan        Problem List Items Addressed This Visit          Endocrine    Type 2 diabetes mellitus without complication, without long-term current use of insulin ([facility] [address] - Primary      At goal, continue current medications, continue current treatment plan, medication adherence emphasized and lifestyle modifications recommended         Relevant Orders    Hemoglobin A1C       Other    Dyslipidemia      Well-controlled, continue current medications, continue current treatment plan, medication adherence emphasized and lifestyle modifications recommended         Relevant Orders    Comprehensive Metabolic Panel    Lipid Panel     Other Visit Diagnoses       Primary osteoarthritis of left knee        Continue current care             Return in about 6 months (around 11/1/2023). Electronically signed by Nehal Liriano DO on 5/2/23       Total time spent was between Time personally spent assessing and managing the patient on the date of service: Est: 30-39 minutes (99903) mins. This included time spent reviewing the patient's medical record (e.g., recent visits, labs, and studies); seeing the patient in the office (face-to-face time); ordering medications, studies, procedures, or referrals; calling the patient or family later in the day with results and further recommendations; and documenting the visit in the medical record. Subjective     Kishor Haley is a 78 y.o. female presenting today for   Chief Complaint   Patient presents with    Diabetes   . Kishor Haley is an 78 y.o. female who presents with arthralgias that began several months ago. Pain is located in the left knee(s). The pain is described as constant, moderate, aching. Associated symptoms include: crepitation and decreased range of motion. Had right knee replaced by Dr. Diya Bravo.  Had steroid shot in left knee

## 2023-05-02 NOTE — OP NOTE
Operative Note      Patient: Sidra Haley  YOB: 1955  MRN: [id number]    Date of Procedure: 5/2/2023    Pre-Op Diagnosis Codes:     * Primary osteoarthritis of left knee [M17.12]    Post-Op Diagnosis: Same       Procedure(s):  LEFT KNEE TOTAL ARTHROPLASTY - S/N    Surgeon(s):  Sully Francisco DO    Assistant:   First Assistant: Shan Forde RN  Resident: Ahmet Dinero DO    Anesthesia: General    Estimated Blood Loss (mL): 227    Complications: None    Specimens:   * No specimens in log *    Implants:  Implant Name Type Inv. Item Serial No.  Lot No. LRB No. Used Action   CEMENT BNE 40GM HI VISC RADPQ FOR REV SURG - [id number]  CEMENT BNE 40GM HI VISC RADPQ FOR REV SURG  [employer] [id number] Left 1 Implanted   CEMENT BNE 40GM HI VISC RADPQ FOR REV SURG - [id number]  CEMENT BNE 40GM HI VISC RADPQ FOR REV SURG  [employer] [id number] Left 1 Implanted   COMPONENT JOURNY CR FEM OXINIUM LT SZ 6 - [id number]  COMPONENT JOURNY CR FEM OXINIUM LT SZ 6  [employer]- [id number] Left 1 Implanted   INSERT TIB SZ 3-4 THK9MM L KNEE XLPE DP [facility] II - [id number]  INSERT TIB SZ 3-4 THK9MM L KNEE XLPE DP [facility] [id number] Left 1 Implanted   BASEPLATE TIB SZ 3 [id number] ML68MM L KNEE NP [facility] - [id number]  BASEPLATE TIB SZ 3 [id number] ML68MM L KNEE NP [facility] AND NEPHEW [employer] [id number] Left 1 Implanted         Drains: * No LDAs found *    Findings: Per operative note      Detailed Description of Procedure:     Denver Young is a 60-year-old female who presents to [facility] surgical department for left total knee arthroplasty. The patient has had progressively worsening left knee pain which is failed to improve despite conservative therapy to include activity modification.   Her symptoms are greatly affecting her usual activities of daily living and as a result she

## 2023-05-02 NOTE — ANESTHESIA POSTPROCEDURE EVALUATION
Department of Anesthesiology  Postprocedure Note    Patient: Kendall Vu  MRN: [id number]  YOB: 1955  Date of evaluation: 5/2/2023      Procedure Summary     Date: 05/02/23 Room / Location: [address] [place] [address] / [facility] - INPATIENT    Anesthesia Start: 1020 Anesthesia Stop: 1210    Procedure: LEFT KNEE TOTAL ARTHROPLASTY - S/N (Left: Knee) Diagnosis:       Primary osteoarthritis of left knee      (Primary osteoarthritis of left knee [M17.12])    Surgeons: Sharon Don DO Responsible Provider: Celine Hernandez MD    Anesthesia Type: Spinal ASA Status: 2          Anesthesia Type: Spinal    Kyle Phase I: Kyle Score: 9    Kyle Phase II:        Anesthesia Post Evaluation    Complications: no

## 2023-05-02 NOTE — INTERVAL H&P NOTE
Interval H&P Note    Pt Name: Srinivas Welch  MRN: [id number]  YOB: 1955  Date of evaluation: 5/2/2023      [x] I have reviewed PAT history and Physical by Severa Brochure NP  dated 4/11/23 for an Interval History and Physical note. [x] I have examined  Kamaljit Haley  There are no changes to the patient who is scheduled for LEFT KNEE TOTAL ARTHROPLASTY - S/N by Ulices Dan DO for Primary osteoarthritis of left knee. The patient denies new health changes, fever, chills, wheezing, cough, increased SOB, chest pain, open sores or wounds. States saw primary care provider yesterday, denies any illness fever or chills. States hgba1c 7.4 from 4/11/23. Blood sugar today 156 mg/dl in preop. Last ate and drank yesterday 1030pm.     Vital signs: BP 93/68   Pulse 76   Temp 97.3 °F (36.3 °C)   Resp 16   Ht 5' 5\" (1.651 m)   Wt 185 lb 3 oz (84 kg)   SpO2 97%   BMI 30.82 kg/m²     Allergies:  Patient has no known allergies. Medications:    Prior to Admission medications    Medication Sig Start Date End Date Taking? Authorizing Provider   atorvastatin (LIPITOR) 40 MG tablet TAKE 1 TABLET DAILY 3/14/23   Angie Hendrix DO   metFORMIN (GLUCOPHAGE-XR) 500 MG extended release tablet Take 2 tablets by mouth every morning (before breakfast) AND 1 tablet Daily with supper. 11/20/22   Angie Hendrix DO   SITagliptin (JANUVIA) 100 MG tablet TAKE 1 TABLET DAILY 10/19/22   Angie Hendrix DO   acetaminophen (TYLENOL) 325 MG tablet Take 2 tablets by mouth every 6 hours as needed    Historical Provider, MD         This is a 78 y.o. female who is pleasant, cooperative, alert and oriented x3, in no acute distress. Heart: Heart sounds are normal.  HR  regular rate and rhythm without murmur, gallop or rub.    Lungs: Normal respiratory effort with equal expansion, good air exchange, unlabored and clear to auscultation without wheezes or rales bilaterally   Abdomen: soft, nontender, nondistended

## 2023-05-02 NOTE — ANESTHESIA PROCEDURE NOTES
Peripheral Block    Patient location during procedure: pre-op  Reason for block: post-op pain management and at surgeon's request  Start time: 5/2/2023 9:16 AM  End time: 5/2/2023 9:16 AM  Staffing  Performed: anesthesiologist   Anesthesiologist: Verna Palmer MD  Preanesthetic Checklist  Completed: patient identified, IV checked, site marked, risks and benefits discussed, surgical/procedural consents, equipment checked, pre-op evaluation, timeout performed, anesthesia consent given, oxygen available, monitors applied/VS acknowledged, fire risk safety assessment completed and verbalized and blood product R/B/A discussed and consented  Peripheral Block   Patient position: supine  Prep: ChloraPrep  Provider prep: mask and sterile gloves  Patient monitoring: cardiac monitor, continuous pulse ox, frequent blood pressure checks and IV access  Block type: Saphenous  Laterality: N/A  Injection technique: single-shot  Guidance: ultrasound guided    Needle   Needle type: insulated echogenic nerve stimulator needle   Needle localization: ultrasound guidance  Assessment   Injection assessment: negative aspiration for heme, no paresthesia on injection, local visualized surrounding nerve on ultrasound and no intravascular symptoms  Slow fractionated injection: yes  Hemodynamics: stable  Outcomes: uncomplicated    Additional Notes  U/S [id number]  Medications Administered  bupivacaine (MARCAINE) PF injection 0.5% - Perineural   10 mL - 5/2/2023 9:16:00 AM  bupivacaine liposome (EXPAREL) injection 1.3% - Perineural   10 mL - 5/2/2023 9:16:00 AM

## 2023-05-02 NOTE — PLAN OF CARE
PROTOCOLS  NURSING IMPLEMENTED    TOTAL JOINT DVT/PE  VENOUS THROMBOEMBOLISM PROPHYLAXIS  (Nursing Automatically Implement)    Mg Haley  [id number]  [unfilled]  5/1/23    YES DVT RISK FACTOR SCORE YES MAJOR BLEEDING RISK FACTORS SCORE     [x] 48years old or greater (1)   [] Hx. Easy Bleeding (1)      [] Heart failure (2)   [] NSAID Use in Last 5 Days (2)      [] Varicose veins - Hx. (1)   [] Gastrointestinal or Genitourinary bleeding in Last 14 Days (2)      [] Myocardial Infarction - Hx. (1)         [] Cancer - Hx. (2)         [] Atrial fibrillation - Hx. (1)         [] Ischemic Stroke - Hx. (1)         [x] Diabetes Mellitus - Hx. (1)         [] Previous DVT/PE - Hx.  (2)         [] Hormone Replacement Therapy (1)         [x] Obesity (1)         [] Paralysis (1)         [] Pregnancy (1)         [] Smoking (1)                   [] Thromophilia (1)   []   Mild to Moderate Bleeding (2)      [] Total Hip Arthroplasty (1)   [] Active Bleeding (4)      [] Family history of PE or DVT? (4) (Consider the following labs to test for presence of inhibitor deficiency state:) Factor V Leiden, Prothrombin Gene Mutation, Protein S Deficiency, Protien C Deficiency, Antithrombin Deficiency   [] Malignant Hypertension (2)        [] Thrombocytopenia 20k to 100k (2)        [] Thrombocytopenia less than 20k (4)        [] Bleeding Diathesis (4)        [] \"Bloody Stick\" Epidural or Spinal (2)     TOTAL DVT SCORE   TOTAL BLEEDING SCORE      [x] CLASS A   Standard Risk DVT (0-3)    [] CLASS X Standard Risk Bleeding (0-4)      [] CLASS B Elevated Risk DVT (greater than 3)    [] CLASS Y High Risk Bleeding (greater than 4)     FINAL MATRIX (e.g. AY)       *If allergic to ASA use Warfarin  *BY patient consider no treatment  **Consider venous filter with high risk PE  **If on Coumadin pre-op, then restart night of surgery      [x]  DVT Prophylaxis: Class AX, AY    Ecotrin 81 mg by mouth BID starting day of surgery for 6 weeks for all total

## 2023-05-05 ENCOUNTER — HOSPITAL ENCOUNTER (OUTPATIENT)
Dept: PHYSICAL THERAPY | Age: 68
Setting detail: THERAPIES SERIES
Discharge: HOME | End: 2023-05-05
Payer: MEDICARE

## 2023-05-05 PROCEDURE — 97161 PT EVAL LOW COMPLEX 20 MIN: CPT

## 2023-05-05 PROCEDURE — 97110 THERAPEUTIC EXERCISES: CPT

## 2023-05-05 PROCEDURE — 97016 VASOPNEUMATIC DEVICE THERAPY: CPT

## 2023-05-05 NOTE — FLOWSHEET NOTE
[facility] Fall Risk Assessment    Patient Name:  Marisol Haley  : 1955    Risk Factor Scale  Score   History of Falls [] Yes  [x] No 25  0 0   Secondary Diagnosis [x] Yes  [] No 15  0 15   Ambulatory Aid [] Furniture  [x] Crutches/cane/walker  [] None/bedrest/wheelchair/nurse 30  15  0 15   IV/Heparin Lock [] Yes  [x] No 20  0 0   Gait/Transferring [] Impaired  [x] Weak  [] Normal/bedrest/immobile 20  10  0 10   Mental Status [] Forgets limitations  [x] Oriented to own ability 15  0 0       Total: 40     Based on the Assessment score: check the appropriate box.     []  No intervention needed   Low =   Score of 0-24    [x]  Use standard prevention interventions Moderate =  Score of 24-44   [x] Give patient handout and discuss fall prevention strategies   [x] Establish goal of education for patient/family RE: fall prevention strategies    []  Use high risk prevention interventions High = Score of 45 and higher   [] Give patient handout and discuss fall prevention strategies   [] Establish goal of education for patient/family Re: fall prevention strategies   [] Discuss lifeline / other resources    Electronically signed by:   Dixie Alegria PT  Date: 2023

## 2023-05-05 NOTE — CONSULTS
knowledge of fall prevention interventions. LTG: (to be met in 18 treatments)  Left knee pain improve to 2/10 at max with going up/down steps reciprocally  Left knee extension improve to 0 degrees AROM  Left knee supine flexion AROM improve to 115 degrees, 125 degrees AAROM  Able to complete 15 reps of SLR without quad lag  Patient able to ambulate up/down 10 steps with one handrail with reciprocal gait pattern without difficulty  LEFS score improve to 30% functionally impaired or less                  Patient goals: (none stated)    Rehab Potential:  [x] Good  [] Fair  [] Poor   Suggested Professional Referral:  [x] No  [] Yes:  Barriers to Goal Achievement:  [x] No  [] Yes:  Domestic Concerns:  [x] No  [] Yes:    Pt. Education:  [x] Plans/Goals, Risks/Benefits discussed  [x] Home exercise program    Method of Education: [x] Verbal  [x] Demo  [x] Written -- see below  Comprehension of Education:  [] Verbalizes understanding. [] Demonstrates understanding. [x] Needs Review. [] Demonstrates/verbalizes understanding of HEP/Ed previously given. Access Code: [email or web address]  URL: Variable/  Date: 05/05/2023  Prepared by: Jt Grover    Exercises  - Supine Quad Set  - 1 x daily - 7 x weekly - 2 sets - 10 reps - 3 seconds hold  - Long Sitting Hamstring Set - External Rotation  - 1 x daily - 7 x weekly - 2 sets - 10 reps - 3 hold  - Supine Bridge  - 1 x daily - 7 x weekly - 2 sets - 10 reps  - Supine Short Arc Quad  - 1 x daily - 7 x weekly - 1-2 sets - 10 reps  - Lying Prone  - 1 x daily - 7 x weekly - 1 sets - 1 reps - 5 min hold  - Prone Gluteal Sets  - 1 x daily - 7 x weekly - 1 sets - 10 reps - 3 seconds hold    Treatment Plan:  [x] Therapeutic Exercise   89856  [] Iontophoresis: 4 mg/mL Dexamethasone Sodium Phosphate  mAmin  63772   [x] Therapeutic Activity  48030 [x] Vasopneumatic cold with compression  47447    [x] Gait Training   53849 [] Ultrasound   39572   [x] Neuromuscular

## 2023-05-08 ENCOUNTER — HOSPITAL ENCOUNTER (OUTPATIENT)
Dept: PHYSICAL THERAPY | Age: 68
Setting detail: THERAPIES SERIES
Discharge: HOME | End: 2023-05-08
Payer: MEDICARE

## 2023-05-08 PROCEDURE — 97110 THERAPEUTIC EXERCISES: CPT

## 2023-05-08 PROCEDURE — 97016 VASOPNEUMATIC DEVICE THERAPY: CPT

## 2023-05-08 NOTE — FLOWSHEET NOTE
[x] [facility] &  Therapy  [address]  [phone]  F: [phone]     Physical Therapy Daily Treatment Note    Date:  2023  Patient Name:  Blair Haley      :  1955   MRN: [id number]  Physician: Dr. Basia Malcolm, DO                            Insurance: Medical [place] Medicare Advantage, [facility]  Medical Diagnosis: J75.558 (ICD-10-CM) - Status post surgery; M17.12 (ICD-10-CM) - Primary osteoarthritis of left knee  Rehab Codes: M 25.562, M 25.662, M 62.81, R 26.89, R 60.0  Onset date: 23                  Next Dr's appt.: 23      Visit# / total visits:   Cancels/No Shows: 0/0  Progress Note due at 10th visit    Subjective:    Pain:  [x] Yes  [] No Location: L knee   Pain Rating: (0-10 scale) 3/10  Pain altered Tx:  [x] No  [] Yes  Action:  Comments:Patient arrives using RW, with absent heel/toe and no knee flexion during swing phase - causing a slight circumduction. Patient reports her overall pain is doing well today with no issued completing HEP. Objective:  Modalities:  vasopneumatic cold/compression, 10 minutes, 38 degrees, low pressure, to left knee with leg on bolster.   Precautions:  Exercises:  Exercise Reps/ Time Weight/ Level Comments   NuStep 8 min Level 3 Billed as therex 23         Seated      Hamstring Stretch on Step Stool 3x20\"     Heel Slides w/towel on floor 10x     LAQ 6x  Difficult - limited ROM         Supine      Quad Isometric 10x5\"  Educated in 30x/hr or as she is able   Hamstring Isometric over Bolster      SAQ 10x AAROM    Heel Slides 10x AAROM    Bridge            Prone      Prone lying with foot off EOB 2 min  Towel roll under thigh   Glute Isometric  10x5\"  Towel left under thigh from knee extension stretch  - discharge to HEP next Tx   TKE 2x10  Bolster under ankles - cues for more quad and less glute activation                Other:      Treatment Charges: Mins Units   []  Modalities     [x]

## 2023-05-10 ENCOUNTER — HOSPITAL ENCOUNTER (OUTPATIENT)
Dept: PHYSICAL THERAPY | Age: 68
Setting detail: THERAPIES SERIES
Discharge: HOME | End: 2023-05-10
Payer: MEDICARE

## 2023-05-10 PROCEDURE — 97116 GAIT TRAINING THERAPY: CPT

## 2023-05-10 PROCEDURE — 97110 THERAPEUTIC EXERCISES: CPT

## 2023-05-10 PROCEDURE — 97016 VASOPNEUMATIC DEVICE THERAPY: CPT

## 2023-05-12 ENCOUNTER — HOSPITAL ENCOUNTER (OUTPATIENT)
Dept: PHYSICAL THERAPY | Age: 68
Setting detail: THERAPIES SERIES
Discharge: HOME | End: 2023-05-12
Payer: MEDICARE

## 2023-05-12 PROCEDURE — 97016 VASOPNEUMATIC DEVICE THERAPY: CPT

## 2023-05-12 PROCEDURE — 97116 GAIT TRAINING THERAPY: CPT

## 2023-05-12 PROCEDURE — 97110 THERAPEUTIC EXERCISES: CPT

## 2023-05-12 NOTE — FLOWSHEET NOTE
[x] [facility] &  Therapy  [address]  [phone]  F: [phone]     Physical Therapy Daily Treatment Note    Date:  2023  Patient Name:  Adelita Haley      :  1955   MRN: [id number]  Physician: Dr. Alec Mason, DO                            Insurance: Medical [place] Medicare Advantage, [facility]  Medical Diagnosis: Y40.193 (ICD-10-CM) - Status post surgery; M17.12 (ICD-10-CM) - Primary osteoarthritis of left knee  Rehab Codes: M 25.562, M 25.662, M 62.81, R 26.89, R 60.0  Onset date: 23                  Next Dr's appt.: 23      Visit# / total visits:   Cancels/No Shows: 0/0  Progress Note due at 10th visit    Subjective:    Pain:  [x] Yes  [] No Location: L knee   Pain Rating: (0-10 scale) 2/10  Pain altered Tx:  [x] No  [] Yes  Action:  Comments: 2-3/10 pain coming in. Overall doing fine. Reports no issues at home. Objective:  Modalities:  vasopneumatic cold/compression, 15 minutes, 36 degrees, low pressure, to left knee with leg on bolster, after exs. Precautions:  Exercises:  Exercise Reps/ Time Weight/ Level Completed 23  Comments   NuStep 6 min  L 2.0 x           Standing       Calf Stretch on Incline 3x30\"  x Added 5/10/23   Calf Raises 2x10  x Added 5/10/23   DF Raises 2x10  x Added 5/10/23   3 - Way Hip 12 x ea  x OKC  Added 5/10/23   HS curl 10 x  x           Gait 3 laps walker 1 lap cane x Around columns in clinic; focus on posture and heel-toe gait pattern.                  Seated       Hamstring Stretch on Step Stool 3x30\"  x    Heel Slides w/towel on floor 2x10  x    LAQ 10x  x Improvement from previous Tx   Marching  10 x  x    Trunk flexion to toes with knee flexion 5 x 5 sec x                                Supine       Quad Isometric 20x5\"  x Educated in 30x/hr or as she is able  Roll behind ankle  Lack 5 degrees   Hamstring Isometric over Bolster 10x5\"  x    SAQ w/bolster 10x  x    Heel Slides 5 x

## 2023-05-15 ENCOUNTER — HOSPITAL ENCOUNTER (OUTPATIENT)
Dept: PHYSICAL THERAPY | Age: 68
Setting detail: THERAPIES SERIES
Discharge: HOME | End: 2023-05-15
Payer: MEDICARE

## 2023-05-15 PROCEDURE — 97110 THERAPEUTIC EXERCISES: CPT

## 2023-05-15 PROCEDURE — 97016 VASOPNEUMATIC DEVICE THERAPY: CPT

## 2023-05-15 NOTE — FLOWSHEET NOTE
[x] [facility]  [address]  [phone]  F: [phone]     Physical Therapy Daily Treatment Note    Date:  5/15/2023  Patient Name:  Lida Haley      :  1955   MRN: [id number]  Physician: Dr. Marv Ruth, DO                            Insurance: Medical [place] Medicare Advantage, [facility]  Medical Diagnosis: Y30.750 (ICD-10-CM) - Status post surgery; M17.12 (ICD-10-CM) - Primary osteoarthritis of left knee  Rehab Codes: M 25.562, M 25.662, M 62.81, R 26.89, R 60.0  Onset date: 23                  Next Dr's appt.: 23      Visit# / total visits:   Cancels/No Shows: 0/0  Progress Note due at 10th visit    Subjective:    Pain:  [x] Yes  [] No Location: L knee   Pain Rating: (0-10 scale) 2/10  Pain altered Tx:  [x] No  [] Yes  Action:  Comments: Pt reports she's getting more comfortable with walking. Objective:  Modalities:  vasopneumatic cold/compression, 15 minutes, 36 degrees, low pressure, to left knee with leg on bolster, after exs.   Precautions:  Exercises:  Exercise Reps/ Time Weight/ Level Completed 05/15/23  Comments   NuStep 6 min  L 2.0 x           Standing       Calf Stretch on Incline 3x30\"  x    Calf Raises 2x10  x    DF Raises 2x10  x    3 - Way Hip 15 x ea  x OKC, Progressed reps 5/15   HS curl 15x  x Progressed reps 5/15   March  15x  x Added 5/15   MiniSquat 10x  x Added 5/15          Gait 1 lap s.cane  x Around columns in clinic; focus on posture and heel-toe gait pattern; 5/15 SBA/CGA 2 minor LOB-self corrects, 1 mod LOB requiring min A to correct                  Seated       Hamstring Stretch on Step Stool 3x30\"  x    Heel Slides w/towel on floor 2x10  x    LAQ 20x  x Improvement from previous Tx, progressed reps 5/15   Marching  15 x  x progressed reps 5/15   Trunk flexion to toes with knee flexion 5 x 5 sec x                                Supine       Quad Isometric 20x5\"  x Educated in 30x/hr or

## 2023-05-17 ENCOUNTER — HOSPITAL ENCOUNTER (OUTPATIENT)
Dept: PHYSICAL THERAPY | Age: 68
Setting detail: THERAPIES SERIES
Discharge: HOME | End: 2023-05-17
Payer: MEDICARE

## 2023-05-17 PROCEDURE — 97016 VASOPNEUMATIC DEVICE THERAPY: CPT

## 2023-05-17 PROCEDURE — 97110 THERAPEUTIC EXERCISES: CPT

## 2023-05-17 NOTE — FLOWSHEET NOTE
Trunk flexion to toes with knee flexion 5 x 5 sec X                                Supine       Quad Isometric 20x5\"  X Educated in 30x/hr or as she is able  Roll behind ankle  Lack 5 degrees   Hamstring Isometric over Bolster 10x5\"      SAQ w/bolster 15x  X progressed reps 5/15   Heel Slides 2x10  X W/green strap   Bridge 15x  X Increased reps 5/17/23   Butterfly 15x Blue X                                       Prone       Prone lying with foot off EOB 2 min  x Towel roll under thigh   TKE 15 x 5 sec x Bolster under ankles - cues for more quad and less glute activation    Knee flexion 10 x  x    Hip ext  15 x   x progressed reps 5/15                               Other: 2 weeks post op 5/17/23    Gait w/s.cane: education on posture during gait, shoulders down, heel strike and toe off. RIGHT KNEE ROM 5/10/23     KNEE FLEX 80° A  90° AA   KNEE EXT 4° A  0° AA         Treatment Charges: Mins Units   []  Modalities     [x]  Ther Exercise 42 3   []  Manual Therapy     []  Ther Activities     []  Aquatics     [x]  Vasocompression 15 1   [x]  Gait 3 0   Total Treatment time 60 4       Assessment: [x] Progressing toward goals: patient with gradually improving mobility and strength in knee following TKA. Vast improvement noted in LAQ completion, where patient was able to achieve near full knee extension with fair quad control. Patient to see ortho tomorrow and get post op bandages removed. Vaso to knee post Tx to address post op swelling and reduce post exercise soreness. [] No change. [] Other:  [x] Patient would benefit from skilled physical therapy services in order to: improve left knee ROM and strength, improve gait, improve balance, decrease risk of falls, improve gait to not rely on assistive device, reduce risk of falls. STG: (to be met in 9 treatments)  ? Pain: Left knee pain improve to 4/10 at max with walking for 20 minutes or greater  ?  ROM:  Left knee extension improve to lacking 4 degrees

## 2023-05-18 ENCOUNTER — OFFICE VISIT (OUTPATIENT)
Dept: ORTHOPEDIC SURGERY | Age: 68
End: 2023-05-18

## 2023-05-18 ENCOUNTER — TELEPHONE (OUTPATIENT)
Dept: FAMILY MEDICINE CLINIC | Age: 68
End: 2023-05-18

## 2023-05-18 VITALS — BODY MASS INDEX: 30.82 KG/M2 | WEIGHT: 185 LBS | HEIGHT: 65 IN | RESPIRATION RATE: 14 BRPM

## 2023-05-18 DIAGNOSIS — Z96.652 S/P TOTAL KNEE ARTHROPLASTY, LEFT: Primary | ICD-10-CM

## 2023-05-18 NOTE — TELEPHONE ENCOUNTER
Patient needs to schedule 4 week follow up appointment from today's visit. 4 weeks was not available and they were wanting to go to [facility]. V's for next one but will come back to [place] if have too. Please call patient as soon as possible to schedule. Thank you.

## 2023-05-18 NOTE — TELEPHONE ENCOUNTER
Called patient. Made a follow up appointment on resident clinic clinic. Per patient, Dr. Sondra Banda said he could see her here on a clinic day.  She will see a resident first.

## 2023-05-19 ENCOUNTER — HOSPITAL ENCOUNTER (OUTPATIENT)
Dept: PHYSICAL THERAPY | Age: 68
Setting detail: THERAPIES SERIES
Discharge: HOME | End: 2023-05-19
Payer: MEDICARE

## 2023-05-19 PROCEDURE — 97016 VASOPNEUMATIC DEVICE THERAPY: CPT

## 2023-05-19 PROCEDURE — 97110 THERAPEUTIC EXERCISES: CPT

## 2023-05-19 NOTE — FLOWSHEET NOTE
[x] [facility]) [facility] &  Therapy  [address]  [phone]  F: [phone]     Physical Therapy Daily Treatment Note    Date:  2023  Patient Name:  Louis Haley      :  1955   MRN: [id number]  Physician: Dr. Papi Ferrer, DO                            Insurance: [employer] Medicare Advantage, [facility]  Medical Diagnosis: R02.278 (ICD-10-CM) - Status post surgery; M17.12 (ICD-10-CM) - Primary osteoarthritis of left knee  Rehab Codes: M 25.562, M 25.662, M 62.81, R 26.89, R 60.0  Onset date: 23                  Next Dr's appt. : 23      Visit# / total visits:   Cancels/No Shows: 0/0  Progress Note due at 10th visit    Subjective:    Pain:  [x] Yes  [] No Location: L knee   Pain Rating: (0-10 scale) 1/10  Pain altered Tx:  [x] No  [] Yes  Action:  Comments:  No compression hose any longer- instructed does not have to wear unless she becomes swollen. Doctor was happy with her ROM. Bandages were removed - no issues. Objective:  Modalities:  vasopneumatic cold/compression, 15 minutes, 34 degrees, moderate pressure, to left knee with leg on bolster, after exs.   Precautions:  Exercises:  Exercise Reps/ Time Weight/ Level Completed 23  Comments   NuStep 6 min  L 2.0 X           Standing       Calf Stretch on Incline 3x30\"  X    Calf Raises 2x10  X    DF Raises 2x10  X    3 - Way Hip 20 x ea 1 lb L LE X Bilaterally 23   Step up 10 x 2\" x    Step lateral 10 x 2\" x    Step down              HS curl 2x10  X Progressed reps 5/15  Bilaterally 23 x ea  x Added 5/15  Bilaterally 23   MiniSquat 10x   Added 5/15          Total Gym       Squats 10 x 3 sec x           Gait 2 lap s.cane  x Around columns in clinic; focus on posture and heel-toe gait pattern; 5/15 SBA/CGA 2 minor LOB-self corrects, 1 mod LOB requiring min A to correct                  Seated       Hamstring Stretch on Step Stool 3x30\"  X    Heel Slides

## 2023-05-21 ASSESSMENT — ENCOUNTER SYMPTOMS
SHORTNESS OF BREATH: 0
EYE DISCHARGE: 0
ABDOMINAL PAIN: 0
ROS SKIN COMMENTS: NEGATIVE FOR RASH

## 2023-05-22 ENCOUNTER — HOSPITAL ENCOUNTER (OUTPATIENT)
Dept: PHYSICAL THERAPY | Age: 68
Setting detail: THERAPIES SERIES
Discharge: HOME | End: 2023-05-22
Payer: MEDICARE

## 2023-05-22 PROCEDURE — 97110 THERAPEUTIC EXERCISES: CPT

## 2023-05-22 PROCEDURE — 97016 VASOPNEUMATIC DEVICE THERAPY: CPT

## 2023-05-22 PROCEDURE — 97140 MANUAL THERAPY 1/> REGIONS: CPT

## 2023-05-22 NOTE — FLOWSHEET NOTE
daily - 7 x weekly - 2 sets - 10 reps  - Supine Short Arc Quad  - 1 x daily - 7 x weekly - 1-2 sets - 10 reps  - Lying Prone  - 1 x daily - 7 x weekly - 1 sets - 1 reps - 5 min hold  - Prone Gluteal Sets  - 1 x daily - 7 x weekly - 1 sets - 10 reps - 3 seconds hold       Plan: [x] Continue current frequency toward long and short term goals. [x] Frequency:  3 x/week for 18 visits, decreasing to twice weekly as improvements are made  [x] Specific Instructions for subsequent treatments: follow TKA protocol for left knee:  Supine, stand, seated, prone ex's as appropriate;  Progress from OKC to CKC.   Progress from NuStep to stationary bike when appropriate             Time In: 1350         Time Out: 1450      Electronically signed by:  Kevin Pedraza PTA

## 2023-05-24 ENCOUNTER — HOSPITAL ENCOUNTER (OUTPATIENT)
Dept: PHYSICAL THERAPY | Age: 68
Setting detail: THERAPIES SERIES
Discharge: HOME | End: 2023-05-24
Payer: MEDICARE

## 2023-05-24 PROCEDURE — 97016 VASOPNEUMATIC DEVICE THERAPY: CPT

## 2023-05-24 PROCEDURE — 97110 THERAPEUTIC EXERCISES: CPT

## 2023-05-24 NOTE — FLOWSHEET NOTE
or less AAROM in supine  Left knee flexion improve to 90 degrees AAROM in supine  ? Strength: Left leg strength grossly 4-/5  ? Function: Patient able to ambulate throughout her home without assistive device safely and confidently  Patient to be independent with home exercise program as demonstrated by performance with correct form without cues. Demonstrate knowledge of fall prevention interventions. LTG: (to be met in 18 treatments)  Left knee pain improve to 2/10 at max with going up/down steps reciprocally  Left knee extension improve to 0 degrees AROM  Left knee supine flexion AROM improve to 115 degrees, 125 degrees AAROM  Able to complete 15 reps of SLR without quad lag  Patient able to ambulate up/down 10 steps with one handrail with reciprocal gait pattern without difficulty  LEFS score improve to 30% functionally impaired or less                  Patient goals: (none stated)    Pt. Education:  [x] Yes  [] No  [x] Reviewed Prior HEP/Ed  Method of Education: [x] Verbal  [x] Demo  [] Written  - educated to do prone exs at home this weekend (Prone knee flexion and prone hip ext)  Comprehension of Education:  [x] Verbalizes understanding. [x] Demonstrates understanding. [] Needs review. [] Demonstrates/verbalizes HEP/Ed previously given. Date: 05/05/2023  Access Code: [email or web address]  URL: [email or web address]  Prepared by: Tova Beckman  Exercises  - Supine Quad Set  - 1 x daily - 7 x weekly - 2 sets - 10 reps - 3 seconds hold  - Long Sitting Hamstring Set - External Rotation  - 1 x daily - 7 x weekly - 2 sets - 10 reps - 3 hold  - Supine Bridge  - 1 x daily - 7 x weekly - 2 sets - 10 reps  - Supine Short Arc Quad  - 1 x daily - 7 x weekly - 1-2 sets - 10 reps  - Lying Prone  - 1 x daily - 7 x weekly - 1 sets - 1 reps - 5 min hold  - Prone Gluteal Sets  - 1 x daily - 7 x weekly - 1 sets - 10 reps - 3 seconds hold       Plan: [x] Continue current frequency toward long and short term goals.      [x]

## 2023-05-24 NOTE — PROGRESS NOTES
[x] [address] [facility] TWELVE-STEP [facility] &  Therapy  [address]  [phone]  F: [phone] [] [address]  [address]   [address]  P: [phone]  F: [phone] [] [address] &  Therapy  [address]  P: [phone]  F: [phone] [] [address]  P: [phone]  F: [phone] [] [address]  [facility]   [address]   [place]: [phone]  F: [phone]      Physical Therapy Progress Note    Date: 2023      Patient: Ricci Haley  : 1955  MRN: [id number]    Physician: Dr. Ermias Luna,                             Insurance: Medical [place] Medicare Advantage, [facility]  Medical Diagnosis: E13.008 (ICD-10-CM) - Status post surgery; M17.12 (ICD-10-CM) - Primary osteoarthritis of left knee  Rehab Codes: M 25.562, M 25.662, M 62.81, R 26.89, R 60.0  Onset date: 23                       Next 's appt. : 23                            Visit# / total visits:                     Cancels/No Shows: 0/0  Date range of services: 23 to 23    Subjective:    Pain:  [x] Yes  [] No   Location: L knee           Pain Rating: (0-10 scale) 1-2/10  Pain altered Tx:  [x] No  [] Yes  Action:  Comments: Much improvement noted today. Reports she took it easy yesterday and focused on stretching. Objective:  Test Measurements/Function: Flexion 99 degrees, extension lacking 2 degrees. Strength grossly 4-/5. Minimal walking without assistive device. Has not attempted to walk 20 minutes yet. No falls. Doing better, taking only Tylenol for pain. Assessment:  STG: (to be met in 9 treatments)  ?  Pain: Left knee pain improve to 4/10 at max with walking for 20 minutes or greater -- Has not tried to walk for 20 minutes

## 2023-05-25 ENCOUNTER — HOSPITAL ENCOUNTER (OUTPATIENT)
Dept: PHYSICAL THERAPY | Age: 68
Setting detail: THERAPIES SERIES
Discharge: HOME | End: 2023-05-25
Payer: MEDICARE

## 2023-05-25 PROCEDURE — 97016 VASOPNEUMATIC DEVICE THERAPY: CPT

## 2023-05-25 PROCEDURE — 97116 GAIT TRAINING THERAPY: CPT

## 2023-05-25 PROCEDURE — 97110 THERAPEUTIC EXERCISES: CPT

## 2023-05-25 NOTE — FLOWSHEET NOTE
[x] [facility]) [facility] &  Therapy  [address]  [phone]  F: [phone]     Physical Therapy Daily Treatment Note    Date:  2023  Patient Name:  Cesar Haley      :  1955   MRN: [id number]  Physician: Dr. Og Perez DO                            Insurance: [employer] Medicare Advantage, [facility]  Medical Diagnosis: N67.206 (ICD-10-CM) - Status post surgery; M17.12 (ICD-10-CM) - Primary osteoarthritis of left knee  Rehab Codes: M 25.562, M 25.662, M 62.81, R 26.89, R 60.0  Onset date: 23                   Next Dr's appt. : 23      Visit# / total visits: 10/18   Cancels/No Shows: 0/0  Progress Note due at  visit    Subjective:    Pain:  [x] Yes  [] No Location: L knee   Pain Rating: (0-10 scale) 1/10  Pain altered Tx:  [x] No  [] Yes  Action:  Comments: 1/10 pain with ambulating, but otherwise doing well this morning. Objective:  Modalities:  vasopneumatic cold/compression, 15 minutes, 38 degrees, moderate pressure, to left knee with leg on bolster, after exs.   Precautions:  Exercises:  Exercise Reps/ Time Weight/ Level Completed 23  Comments   NuStep 6 min  L 2.0     Treadmill 12 min 1.5 mph  0.8 mph  6 min fwd/6 min bkwd          Standing       Calf Stretch on Incline 3x30\"  X    Calf Raises 2x10 1 lb     DF Raises 2x10 1 lb X    3 - Way Hip 20 x ea 1 lb  X Bilaterally 23   Step up 20x 4 inch X Increased reps 23   Step lateral 20x 4 inch X Increased reps 23   Step down 2x10 4 inch X Increased reps 23          HS Curl 2x10 1 lb X Added weight 23 x ea 1 lb X Added weight 23   MiniSquat 10x   Added 5/15          Total Gym       Squats 3x10 3 sec  Level 30 X           Gait 2 lap s.cane   Around columns in clinic; focus on posture and heel-toe gait pattern; 5/15 SBA/CGA 2 minor LOB-self corrects, 1 mod LOB requiring min A to correct                  Seated       Hamstring Stretch on

## 2023-05-30 ENCOUNTER — HOSPITAL ENCOUNTER (OUTPATIENT)
Dept: PHYSICAL THERAPY | Age: 68
Setting detail: THERAPIES SERIES
Discharge: HOME | End: 2023-05-30
Payer: MEDICARE

## 2023-05-30 PROCEDURE — 97016 VASOPNEUMATIC DEVICE THERAPY: CPT

## 2023-05-30 PROCEDURE — 97110 THERAPEUTIC EXERCISES: CPT

## 2023-05-30 NOTE — FLOWSHEET NOTE
[x] [facility] &  Therapy  [address]  [phone]  F: [phone]     Physical Therapy Daily Treatment Note    Date:  2023  Patient Name:  Tremayne Haley      :  1955   MRN: [id number]  Physician: Dr. Bob Goins DO                            Insurance: Medical [place] Medicare Advantage, [facility]  Medical Diagnosis: L65.767 (ICD-10-CM) - Status post surgery; M17.12 (ICD-10-CM) - Primary osteoarthritis of left knee  Rehab Codes: M 25.562, M 25.662, M 62.81, R 26.89, R 60.0  Onset date: 23                   Next 's appt. : 23      Visit# / total visits:    Cancels/No Shows: 0/0  Progress Note due at  visit    Subjective:    Pain:  [x] Yes  [] No Location: L knee   Pain Rating: (0-10 scale) 1/10  Pain altered Tx:  [x] No  [] Yes  Action:  Comments: Was able to go to their cottage and do a fair amount of walking both with and without AD use. No major issues over the Holiday weekend, and continues to report minimal pain in knee. Does report some increased swelling, but was informed this is common with increased activities. Objective:  Modalities:  vasopneumatic cold/compression, 15 minutes, 38 degrees, moderate pressure, to left knee with leg on bolster, after exs.   Precautions:  Exercises:  Exercise Reps/ Time Weight/ Level Completed 23  Comments   NuStep 6 min  L 2.0 X    Treadmill 12 min 1.5 mph  0.8 mph  6 min fwd/6 min bkwd          Standing       Calf Stretch on Incline 3x30\"  X    Knee Flexion Stretch 10x10\"  ADD    Calf Raises 2x10 1 lb     DF Raises 2x10 1 lb X    3 - Way Hip 20 x ea 1 lb  X Bilaterally 23   Step up 20x 6 inch  1 lb X Increased step 23   Step lateral 20x 6 inch  1 lb X Increased step 23   Step down 2x10 4 inch  1 lb X Increased reps 5/25/23          HS Curl 2x10 1 lb X Added weight 23 x ea 1 lb X Added weight 23          Total Gym       Squats 3x10 3

## 2023-05-31 ENCOUNTER — HOSPITAL ENCOUNTER (OUTPATIENT)
Dept: PHYSICAL THERAPY | Age: 68
Setting detail: THERAPIES SERIES
Discharge: HOME | End: 2023-05-31
Payer: MEDICARE

## 2023-05-31 PROCEDURE — 97110 THERAPEUTIC EXERCISES: CPT

## 2023-05-31 PROCEDURE — 97016 VASOPNEUMATIC DEVICE THERAPY: CPT

## 2023-05-31 NOTE — FLOWSHEET NOTE
3x30\"      LAQ 3x10 2 lb X Increased reps/weight 5/25/23   HS Curls 2x10 2 lb  Lime X Added 5/25/3   Marching  15 x 1 lb  progressed reps 5/15   Trunk flexion to toes with knee flexion 5 x 5 sec                                 Supine       SAQ w/bolster 3x10 2 lb X    Heel Slides 3x10 2 lb X W/green strap   Bridge 20 x   Increased reps 5/17/23   Butterfly 15x Blue     SLR 2x10 1 lb X Added weight 5/30/23          Prone       Other: 4 weeks post op 5/30/23    Manual: roller stick to hamstrings and gastroc/soleus, contract relax stretches of both hamstrings and quads in prone    RIGHT KNEE ROM 5/19/23     KNEE FLEX 105° A    KNEE EXT Lacking 3 degrees AROM, 0°AAROM         Treatment Charges: Mins Units   []  Modalities     [x]  Ther Exercise 40 3   [x]  Manual Therapy     []  Ther Activities     []  Aquatics     [x]  Vasocompression 15 1   [x]  Gait     Total Treatment time 55 4       Assessment: [x] Progressing toward goals: Added incline to heel/toe raises to improve LE strength. SLS added to address single leg instability and improve overall stability/balance for improved gait. Good tolerance with all other therex completed this date, with continued quad weakness appreciated. Some incerased swelling noted in LE, which was reduced some with vasocompression this date. [] No change. [] Other:  [x] Patient would benefit from skilled physical therapy services in order to: improve left knee ROM and strength, improve gait, improve balance, decrease risk of falls, improve gait to not rely on assistive device, reduce risk of falls. STG: (to be met in 9 treatments)  ? Pain: Left knee pain improve to 4/10 at max with walking for 20 minutes or greater -- Has not tried to walk for 20 minutes yet. Had some spasms in the back of the leg - better today. Sitting 0-1/10; walking is about 1-2/10 without medication.     ? ROM:  Left knee extension improve to lacking 4 degrees or less AAROM in supine -- AROM lacking 2

## 2023-06-02 ENCOUNTER — HOSPITAL ENCOUNTER (OUTPATIENT)
Dept: PHYSICAL THERAPY | Age: 68
Setting detail: THERAPIES SERIES
Discharge: HOME | End: 2023-06-02
Payer: MEDICARE

## 2023-06-05 ENCOUNTER — HOSPITAL ENCOUNTER (OUTPATIENT)
Dept: PHYSICAL THERAPY | Age: 68
Setting detail: THERAPIES SERIES
Discharge: HOME | End: 2023-06-05
Payer: MEDICARE

## 2023-06-05 PROCEDURE — 97016 VASOPNEUMATIC DEVICE THERAPY: CPT

## 2023-06-05 PROCEDURE — 97110 THERAPEUTIC EXERCISES: CPT

## 2023-06-05 NOTE — FLOWSHEET NOTE
[x] [facility]  [address]    [phone]  F: [phone]     Physical Therapy Cancel/No Show note    Date: 2023  Patient: Flory Haley  : 1955  MRN: [id number]    Cancels/No Shows to date: 0  not charged for missed visit 23    For today's appointment patient:    [x]  Cancelled    [] Rescheduled appointment    [] No-show     Reason given by patient:    []  Patient ill    []  Conflicting appointment    [] No transportation      [] Conflict with work    [] No reason given    [] Weather related    [] [id number]    [x] Other:      Comments: 23: provider cx appt - due to being sick      [x] Next appointment was confirmed    Electronically signed by: Debbie Everett PTA

## 2023-06-05 NOTE — FLOWSHEET NOTE
bike when appropriate;  lunges, single leg total gym squats, check ROM            Time In: 1302          Time Out: [place]      Electronically signed by:  Srinath Kumari PTA

## 2023-06-07 ENCOUNTER — HOSPITAL ENCOUNTER (OUTPATIENT)
Dept: PHYSICAL THERAPY | Age: 68
Setting detail: THERAPIES SERIES
Discharge: HOME | End: 2023-06-07
Payer: MEDICARE

## 2023-06-07 PROCEDURE — 97140 MANUAL THERAPY 1/> REGIONS: CPT

## 2023-06-07 PROCEDURE — 97110 THERAPEUTIC EXERCISES: CPT

## 2023-06-07 PROCEDURE — 97016 VASOPNEUMATIC DEVICE THERAPY: CPT

## 2023-06-07 NOTE — FLOWSHEET NOTE
- 7 x weekly - 2 sets - 10 reps - 3 seconds hold  - Long Sitting Hamstring Set - External Rotation  - 1 x daily - 7 x weekly - 2 sets - 10 reps - 3 hold  - Supine Bridge  - 1 x daily - 7 x weekly - 2 sets - 10 reps  - Supine Short Arc Quad  - 1 x daily - 7 x weekly - 1-2 sets - 10 reps  - Lying Prone  - 1 x daily - 7 x weekly - 1 sets - 1 reps - 5 min hold  - Prone Gluteal Sets  - 1 x daily - 7 x weekly - 1 sets - 10 reps - 3 seconds hold       Plan: [x] Continue current frequency toward long and short term goals.      [x] Frequency:  3 x/week for 18 visits, decreasing to twice weekly as improvements are made  [x] Specific Instructions for subsequent treatments: follow TKA protocol for left knee:  Progress from NuStep to stationary bike when appropriate;  lunges, single leg total gym squats,             Time In: 1300          Time Out: 1405      Electronically signed by:  Andrea Giron PTA

## 2023-06-09 ENCOUNTER — HOSPITAL ENCOUNTER (OUTPATIENT)
Dept: PHYSICAL THERAPY | Age: 68
Setting detail: THERAPIES SERIES
Discharge: HOME | End: 2023-06-09
Payer: MEDICARE

## 2023-06-09 ENCOUNTER — TELEPHONE (OUTPATIENT)
Dept: ORTHOPEDIC SURGERY | Age: 68
End: 2023-06-09

## 2023-06-09 PROCEDURE — 97140 MANUAL THERAPY 1/> REGIONS: CPT

## 2023-06-09 PROCEDURE — 97110 THERAPEUTIC EXERCISES: CPT

## 2023-06-09 PROCEDURE — 97016 VASOPNEUMATIC DEVICE THERAPY: CPT

## 2023-06-09 NOTE — TELEPHONE ENCOUNTER
Patient called and back and stated that she spoke with Dr. bIan Keller to schedule appointment on resident clinic.

## 2023-06-09 NOTE — TELEPHONE ENCOUNTER
Called patient regarding appointment on Wednesday with resident clinic.  Patient see Dr. Mirta Burkitt at his private clinic-was going to reschedule

## 2023-06-09 NOTE — FLOWSHEET NOTE
Sitting Hamstring Set - External Rotation  - 1 x daily - 7 x weekly - 2 sets - 10 reps - 3 hold  - Supine Bridge  - 1 x daily - 7 x weekly - 2 sets - 10 reps  - Supine Short Arc Quad  - 1 x daily - 7 x weekly - 1-2 sets - 10 reps  - Lying Prone  - 1 x daily - 7 x weekly - 1 sets - 1 reps - 5 min hold  - Prone Gluteal Sets  - 1 x daily - 7 x weekly - 1 sets - 10 reps - 3 seconds hold       Plan: [x] Continue current frequency toward long and short term goals.      [x] Frequency:  3 x/week for 18 visits, decreasing to twice weekly as improvements are made  [x] Specific Instructions for subsequent treatments: follow TKA protocol for left knee:  Progress from NuStep to stationary bike when appropriate;  vijaya, single leg total gym squats,             Time In: 1000 am          Time Out: 1005 am      Electronically signed by:  Marivel Shin PTA

## 2023-06-12 ENCOUNTER — APPOINTMENT (OUTPATIENT)
Dept: PHYSICAL THERAPY | Age: 68
End: 2023-06-12
Payer: MEDICARE

## 2023-06-16 ENCOUNTER — HOSPITAL ENCOUNTER (OUTPATIENT)
Dept: PHYSICAL THERAPY | Age: 68
Setting detail: THERAPIES SERIES
End: 2023-06-16
Payer: MEDICARE

## 2023-06-20 ENCOUNTER — HOSPITAL ENCOUNTER (OUTPATIENT)
Dept: PHYSICAL THERAPY | Age: 68
Setting detail: THERAPIES SERIES
Discharge: HOME | End: 2023-06-20
Payer: MEDICARE

## 2023-06-20 PROCEDURE — 97110 THERAPEUTIC EXERCISES: CPT

## 2023-06-20 PROCEDURE — 97016 VASOPNEUMATIC DEVICE THERAPY: CPT

## 2023-06-20 NOTE — FLOWSHEET NOTE
[x] [facility] &  Therapy  [address]  [phone]  F: [phone]     Physical Therapy Daily Treatment Note    Date:  2023  Patient Name:  Mago Haley      :  1955   MRN: [id number]  Physician: Dr. Yazmin Conway, DO                            Insurance: Medical [place] Medicare Advantage, [facility]  Medical Diagnosis: T88.565 (ICD-10-CM) - Status post surgery; M17.12 (ICD-10-CM) - Primary osteoarthritis of left knee  Rehab Codes: M 25.562, M 25.662, M 62.81, R 26.89, R 60.0  Onset date: 23                   Next 's appt.: 23      Visit# / total visits:    Cancels/No Shows: 0/0      Subjective:    Pain:  [x] Yes  [] No Location: L knee   Pain Rating: (0-10 scale) 0/10  Pain altered Tx:  [x] No  [] Yes  Action:  Comments: Doing well today with no reports of pain currently. Tolerating Tx well. Objective:  Modalities:  vasopneumatic cold/compression, 15 minutes, 38 degrees, moderate pressure, to left knee with leg on bolster, after exs. Precautions:  Exercises:  Exercise Reps/ Time Weight/ Level Completed 23  Comments   NuStep 6 min  L 3.0  Inc resistance 6.13   Treadmill 12 min 1.5 mph  0.8 mph  6 min fwd/6 min bkwd   Stationary bike 5 min  X Able to make full revolution. Seat on 7.                  Standing       Calf Stretch on Incline 3x30\"  X    Hamstring Stretch on Step 3x30\"  X    Knee Flexion Stretch 10x10\"  X    Calf Raises on Incline 2x10 1 lb     DF Raises on Incline 2x10 1 lb     SLS 3x30\"      3 - Way Hip 20 x ea 1 lb      Step up 20x 8 inch  1 lb X Regressed to 4\" box 6.13   Step lateral 20x 6 inch  1 lb     Step down 2x10 4 inch  1 lb X 5 x 4\"  15 x 2\"  Focused on technique          HS Curl 2x10 1 lb      x ea 1 lb     TKE 20x5\" Plum X Added 6.13                 Total Gym       Squats 3x10 3 sec  Level 35 X Cues to increase L LE weight bearing    SL Squats 2x10 Level 23 X Added 23

## 2023-06-22 ENCOUNTER — HOSPITAL ENCOUNTER (OUTPATIENT)
Dept: PHYSICAL THERAPY | Age: 68
Setting detail: THERAPIES SERIES
Discharge: HOME | End: 2023-06-22
Payer: MEDICARE

## 2023-06-22 PROCEDURE — 97016 VASOPNEUMATIC DEVICE THERAPY: CPT

## 2023-06-22 PROCEDURE — 97110 THERAPEUTIC EXERCISES: CPT

## 2023-07-05 ENCOUNTER — HOSPITAL ENCOUNTER (OUTPATIENT)
Dept: PHYSICAL THERAPY | Age: 68
Setting detail: THERAPIES SERIES
Discharge: HOME | End: 2023-07-05
Payer: MEDICARE

## 2023-07-05 PROCEDURE — 97110 THERAPEUTIC EXERCISES: CPT

## 2023-07-05 PROCEDURE — 97016 VASOPNEUMATIC DEVICE THERAPY: CPT

## 2023-07-05 NOTE — FLOWSHEET NOTE
[x] [facility]) - [facility] &  Therapy  [address]  [phone]  F: [phone]     Physical Therapy Daily Treatment Note    Date:  2023  Patient Name:  Natasha Haley      :  1955   MRN: [id number]  Physician: Dr. Tali Forbes,                             Insurance: Medical [place] Medicare Advantage, [facility]  Medical Diagnosis: T92.889 (ICD-10-CM) - Status post surgery; M17.12 (ICD-10-CM) - Primary osteoarthritis of left knee  Rehab Codes: M 25.562, M 25.662, M 62.81, R 26.89, R 60.0  Onset date: 23                   Next 's appt.: 23      Visit# / total visits:    Cancels/No Shows: 0/0      Subjective:    Pain:  [x] Yes  [] No Location: L knee   Pain Rating: (0-10 scale) 1-2/10  Pain altered Tx:  [x] No  [] Yes  Action:  Comments: Patient returns to clinic after vacation. Reports knee is doing well overall, just some current stiffness in her knee. Objective:  Modalities:  vasopneumatic cold/compression, 15 minutes, 38 degrees, moderate pressure, to left knee with leg on bolster, after exs. Precautions:  Exercises:  Exercise Reps/ Time Weight/ Level Completed 23  Comments   NuStep 6 min  L 3.0  Inc resistance 6.13   Treadmill 12 min 1.5 mph  0.8 mph  6 min fwd/6 min bkwd   Stationary bike 5 min  X Able to make full revolution. Seat on 7.                  Standing       Calf Stretch on Incline 3x30\"  X    Hamstring Stretch on Step 3x30\"  X    Knee Flexion Stretch 10x10\"  X    Calf Raises on Incline 2x10 2 lb     DF Raises on Incline 2x10 2 lb     SLS 3x30\"  X UE Support prn   3 - Way Hip 20 x ea 2 lb  X    Step up 20x 8 inch  2 lb X    Step lateral 20x 6 inch  2 lb X    Step down 2x10 2 inch  2 lb X 2\" step focus on technique   Mini Lunge 2x10  X Added 23  fwd          HS Curl 2x10 2 lb X     x ea 2 lb X    TKE 20x5\" Plum X                  Total Gym       Squats 3x10 3 sec  Level 35 X Cues to increase L LE weight

## 2023-07-06 ENCOUNTER — HOSPITAL ENCOUNTER (OUTPATIENT)
Dept: PHYSICAL THERAPY | Age: 68
Setting detail: THERAPIES SERIES
Discharge: HOME | End: 2023-07-06
Payer: MEDICARE

## 2023-07-06 PROCEDURE — 97112 NEUROMUSCULAR REEDUCATION: CPT

## 2023-07-06 PROCEDURE — 97016 VASOPNEUMATIC DEVICE THERAPY: CPT

## 2023-07-06 PROCEDURE — 97110 THERAPEUTIC EXERCISES: CPT

## 2023-07-06 NOTE — FLOWSHEET NOTE
[x] [address]  [address] [place] [address].  [phone]  F: [phone]     Physical Therapy Daily Treatment Note    Date:  2023  Patient Name:  Sammy Haley      :  1955   MRN: [id number]  Physician: Dr. Cody Go DO                            Insurance: Medical [place] Medicare Advantage, [facility]  Medical Diagnosis: Z68.245 (ICD-10-CM) - Status post surgery; M17.12 (ICD-10-CM) - Primary osteoarthritis of left knee  Rehab Codes: M 25.562, M 25.662, M 62.81, R 26.89, R 60.0  Onset date: 23                   Next Dr's appt.: 23      Visit# / total visits:    Cancels/No Shows: 0/0      Subjective:    Pain:  [x] Yes  [] No Location: L knee   Pain Rating: (0-10 scale) 2/10  Pain altered Tx:  [x] No  [] Yes  Action:  Comments: A little soreness in knee from yesterday's Tx and going to [address] afterwards. Objective:  Modalities:  vasopneumatic cold/compression, 15 minutes, 38 degrees, moderate pressure, to left knee with leg on bolster, after exs. Precautions:  Exercises:  Exercise Reps/ Time Weight/ Level Completed 23  Comments   NuStep 6 min  L 3.0  Inc resistance 6.13   Treadmill 12 min 1.5 mph  0.8 mph  6 min fwd/6 min bkwd   Stationary bike 5 min  X Able to make full revolution. Seat on 7.                  Standing       Calf Stretch on Incline 3x30\"  X    Hamstring Stretch on Step 3x30\"  X    Knee Flexion Stretch 10x10\"  X    Calf Raises on Incline 2x10 2 lb     DF Raises on Incline 2x10 2 lb     SLS 3x30\"   UE Support prn   3 - Way Hip 20 x ea 2 lb      Step up 20x 8 inch  2 lb     Step lateral 20x 6 inch  2 lb     Step down 2x10 2 inch  2 lb  2\" step focus on technique   Mini Lunge 2x10   Added 23  fwd          HS Curl 2x10 2 lb      x ea 2 lb     TKE 20x5\" Plum                   Total Gym       Squats 3x10 3 sec  Level 35  Cues to increase L LE weight bearing    SL Squats 2x10 Level 23  Added 23

## 2023-07-11 ENCOUNTER — HOSPITAL ENCOUNTER (OUTPATIENT)
Dept: PHYSICAL THERAPY | Age: 68
Setting detail: THERAPIES SERIES
Discharge: HOME | End: 2023-07-11
Payer: MEDICARE

## 2023-07-11 PROCEDURE — 97016 VASOPNEUMATIC DEVICE THERAPY: CPT

## 2023-07-11 PROCEDURE — 97112 NEUROMUSCULAR REEDUCATION: CPT

## 2023-07-11 PROCEDURE — 97110 THERAPEUTIC EXERCISES: CPT

## 2023-07-11 NOTE — FLOWSHEET NOTE
(to be met in 9 treatments)  ? Pain: Left knee pain improve to 4/10 at max with walking for 20 minutes or greater -- Has not tried to walk for 20 minutes yet. Had some spasms in the back of the leg - better today. Sitting 0-1/10; walking is about 1-2/10 without medication. ? ROM:  Left knee extension improve to lacking 4 degrees or less AAROM in supine -- AROM lacking 2 degrees. Left knee flexion improve to 90 degrees AAROM in supine -- Met, 99 degrees AROM flexion. ? Strength: Left leg strength grossly 4-/5 -- Met, 4-/5 grossly in left leg.   ? Function: Patient able to ambulate throughout her home without assistive device safely and confidently -- Continues to walk through house with walker, but can set to the side and walk around the counter without it. Patient to be independent with home exercise program as demonstrated by performance with correct form without cues. -- Met  Demonstrate knowledge of fall prevention interventions. -- Met     LTG: (to be met in 18 treatments)  Left knee pain improve to 2/10 at max with going up/down steps reciprocally -- Mostly step together but has been working on reciprocally up the steps. Pain is 3-4/10 reciprocally. 4-5/10 coming down steps. Using a lot of arm support. Left knee extension improve to 0 degrees AROM -- lacking 10 degrees AROM, lacking 7 degrees AAROM  Left knee supine flexion AROM improve to 115 degrees, 125 degrees AAROM -- AAROM 107 degrees. Able to complete 15 reps of SLR without quad lag -- able to complete 15 reps of SLR, with minimal quad lad. Patient able to ambulate up/down 10 steps with one handrail with reciprocal gait pattern without difficulty -- Using a lot of arm support; inconsistent with gait pattern (at times reciprocal, at other times step together). LEFS score improve to 30% functionally impaired or less -- improvement made to 49% functionally impaired.                   LTG (to be met in 30 visits):  Left knee pain improve to

## 2023-07-13 ENCOUNTER — HOSPITAL ENCOUNTER (OUTPATIENT)
Dept: PHYSICAL THERAPY | Age: 68
Setting detail: THERAPIES SERIES
Discharge: HOME | End: 2023-07-13
Payer: MEDICARE

## 2023-07-13 PROCEDURE — 97110 THERAPEUTIC EXERCISES: CPT

## 2023-07-13 PROCEDURE — 97016 VASOPNEUMATIC DEVICE THERAPY: CPT

## 2023-07-13 NOTE — FLOWSHEET NOTE
in the back of the leg - better today. Sitting 0-1/10; walking is about 1-2/10 without medication. ? ROM:  Left knee extension improve to lacking 4 degrees or less AAROM in supine -- AROM lacking 2 degrees. Left knee flexion improve to 90 degrees AAROM in supine -- Met, 99 degrees AROM flexion. ? Strength: Left leg strength grossly 4-/5 -- Met, 4-/5 grossly in left leg.   ? Function: Patient able to ambulate throughout her home without assistive device safely and confidently -- Continues to walk through house with walker, but can set to the side and walk around the counter without it. Patient to be independent with home exercise program as demonstrated by performance with correct form without cues. -- Met  Demonstrate knowledge of fall prevention interventions. -- Met     LTG: (to be met in 18 treatments)  Left knee pain improve to 2/10 at max with going up/down steps reciprocally -- Mostly step together but has been working on reciprocally up the steps. Pain is 3-4/10 reciprocally. 4-5/10 coming down steps. Using a lot of arm support. Left knee extension improve to 0 degrees AROM -- lacking 10 degrees AROM, lacking 7 degrees AAROM  Left knee supine flexion AROM improve to 115 degrees, 125 degrees AAROM -- AAROM 107 degrees. Able to complete 15 reps of SLR without quad lag -- able to complete 15 reps of SLR, with minimal quad lad. Patient able to ambulate up/down 10 steps with one handrail with reciprocal gait pattern without difficulty -- Using a lot of arm support; inconsistent with gait pattern (at times reciprocal, at other times step together). LEFS score improve to 30% functionally impaired or less -- improvement made to 49% functionally impaired.                   LTG (to be met in 30 visits):  Left knee pain improve to 2/10 at max with going up/down steps reciprocally  Left knee extension improve to 0 degrees AROM  Left knee supine flexion AROM improve to 115 degrees, 125 degrees AAROM   Able

## 2023-07-18 ENCOUNTER — HOSPITAL ENCOUNTER (OUTPATIENT)
Dept: PHYSICAL THERAPY | Age: 68
Setting detail: THERAPIES SERIES
Discharge: HOME | End: 2023-07-18
Payer: MEDICARE

## 2023-07-18 PROCEDURE — 97016 VASOPNEUMATIC DEVICE THERAPY: CPT

## 2023-07-18 PROCEDURE — 97110 THERAPEUTIC EXERCISES: CPT

## 2023-07-18 NOTE — FLOWSHEET NOTE
functionally impaired. LTG (to be met in 30 visits):  Left knee pain improve to 2/10 at max with going up/down steps reciprocally  Left knee extension improve to 0 degrees AROM  Left knee supine flexion AROM improve to 115 degrees, 125 degrees AAROM   Able to complete 15 reps of SLR without quad lag   Patient able to ambulate up/down 10 steps with one handrail with reciprocal gait pattern without difficulty  LEFS score improve to 30% functionally impaired or less   Pain improve to 1/10 at max with resting at all times. Patient goals: (none stated)    Pt. Education:  [x] Yes  [] No  [x] Reviewed Prior HEP/Ed  Method of Education: [x] Verbal  [x] Demo  [] Written   Comprehension of Education:  [x] Verbalizes understanding. [x] Demonstrates understanding. [] Needs review. [x] Demonstrates/verbalizes HEP/Ed previously given. Date: 05/05/2023  Access Code: [email or web address]  URL: [email or web address]  Prepared by: Jennifer Byrdvius  Exercises  - Supine Quad Set  - 1 x daily - 7 x weekly - 2 sets - 10 reps - 3 seconds hold  - Long Sitting Hamstring Set - External Rotation  - 1 x daily - 7 x weekly - 2 sets - 10 reps - 3 hold  - Supine Bridge  - 1 x daily - 7 x weekly - 2 sets - 10 reps  - Supine Short Arc Quad  - 1 x daily - 7 x weekly - 1-2 sets - 10 reps  - Lying Prone  - 1 x daily - 7 x weekly - 1 sets - 1 reps - 5 min hold  - Prone Gluteal Sets  - 1 x daily - 7 x weekly - 1 sets - 10 reps - 3 seconds hold     Date: 07/13/2023  Access Code: [email or web address]  URL: [email or web address]  Prepared by: Ruel Antoine    Exercises  - Standing Hip Extension with Anchored Resistance  - 1 x daily - 7 x weekly - 3 sets - 10 reps  - Standing Hip Abduction with Anchored Resistance  - 1 x daily - 7 x weekly - 3 sets - 10 reps  - Standing Hip Adduction with Anchored Resistance  - 1 x daily - 7 x weekly - 3 sets - 10 reps  - Standing Hip Flexion with Anchored Resistance  - 1 x daily - 7 x weekly - 3 sets - 10

## 2023-07-20 ENCOUNTER — HOSPITAL ENCOUNTER (OUTPATIENT)
Dept: PHYSICAL THERAPY | Age: 68
Setting detail: THERAPIES SERIES
Discharge: HOME | End: 2023-07-20
Payer: MEDICARE

## 2023-07-20 PROCEDURE — 97110 THERAPEUTIC EXERCISES: CPT

## 2023-07-20 PROCEDURE — 97016 VASOPNEUMATIC DEVICE THERAPY: CPT

## 2023-07-20 NOTE — FLOWSHEET NOTE
[x] [facility] ([facility]) [facility] &  Therapy  [address].  [phone]  F: [phone]     Physical Therapy Daily Treatment Note    Date:  2023  Patient Name:  Kati Haley      :  1955   MRN: [id number]  Physician: Dr. Sunita Briones,                             Insurance: [employer] [place] Medicare Advantage, [facility]  Medical Diagnosis: J83.382 (ICD-10-CM) - Status post surgery; M17.12 (ICD-10-CM) - Primary osteoarthritis of left knee  Rehab Codes: M 25.562, M 25.662, M 62.81, R 26.89, R 60.0  Onset date: 23                   Next 's appt.: 23      Visit# / total visits:   Medicare progress note due by visit 28   Cancels/No Shows: 0/0      Subjective:    Pain:  [x] Yes  [] No Location: L knee   Pain Rating: (0-10 scale) 2/10  Pain altered Tx:  [x] No  [] Yes  Action:  Comments: Tolerating progressions well. Continues to demonstrate slight gait deficits (slight abduction with slight vaulting and limited flexion)        Objective:  Modalities:  vasopneumatic cold/compression, 15 minutes, 38 degrees, moderate pressure, to left knee with leg on bolster, after exs. No issues wearing heel lift. Precautions:  Exercises:  Exercise Reps/ Time Weight/ Level Completed 23  Comments   Treadmill 12 min 1.5 mph  0.8 mph  6 min fwd/6 min bkwd   Stationary bike 5 min timer X Able to make full revolution.   Seat on 7                 Standing       Calf Stretch on Incline 3x30\"  X    Hamstring Stretch on Step 3x30\"  X    Knee Flexion Stretch 10x10\"  X    Calf Raises on Incline 2x10 2 lb     DF Raises on Incline 2x10 2 lb     SLS 3x30\"   UE Support prn   4 - Way Hip 15ea Lime  Progressed to band 23   Step up 20x 8 inch  2 lb     Step lateral 20x 6 inch  2 lb     Step down 2x10 2 inch  2 lb  2\" step focus on technique   Mini Lunge  2x10 ea  X Added side lunge 23  Fwd/lat          HS Curl 2x10 2 lb     March  20 x ea 2 lb     TKE 20x5\" Plum

## 2023-07-24 ENCOUNTER — HOSPITAL ENCOUNTER (OUTPATIENT)
Dept: PHYSICAL THERAPY | Age: 68
Setting detail: THERAPIES SERIES
Discharge: HOME | End: 2023-07-24
Payer: MEDICARE

## 2023-07-24 PROCEDURE — 97016 VASOPNEUMATIC DEVICE THERAPY: CPT

## 2023-07-24 PROCEDURE — 97110 THERAPEUTIC EXERCISES: CPT

## 2023-07-24 NOTE — PROGRESS NOTES
[x] [facility] ([facility]) - [facility] TWELVESTEP [facility] &  Therapy  [address]  [phone]  F: [phone] [] [address] [facility]  [address]   [address]  P: [phone]  F: [phone] [] [address] &  Therapy  [address]  P: [phone]  F: [phone] [] [place]  P: [phone]  F: [phone] [] [address] [facility]  [address]   [address]   [place]: [phone]  F: [phone]      Physical Therapy Progress Note    Date: 2023      Patient: Delois Bosworth Dysard  : 1955  MRN: [id number]    Physician: Dr. Rhina Delgado DO                            Insurance: [employer] Medicare Advantage, [facility]  Medical Diagnosis: N24.409 (ICD-10-CM) - Status post surgery; M17.12 (ICD-10-CM) - Primary osteoarthritis of left knee  Rehab Codes: M 25.562, M 25.662, M 62.81, R 26.89, R 60.0  Onset date: 23                                   Next 's appt.: 23                            Visit# / total visits:   Medicare progress note due by visit 28                         Cancels/No Shows: 0/0  Date range of services: 23 - 23    Subjective:    Pain:  [x] Yes  [] No   Location: L knee           Pain Rating: (0-10 scale) 1-2/10  Pain altered Tx:  [x] No  [] Yes  Action:  Comments: 1/10 to walk on the left leg. Started going without the cane this weekend. Noticed she was losing it more and just carrying it more. 2/10 up/down steps. Objective:  Test Measurements/Function:  Working on improving gait pattern. Flexion 111 degrees, extension lacking 4 degrees (after stretching)    Reciprocal up steps. Pain is lower. Assessment:  STG: (to be met in 9 treatments)  ?  Pain: Left knee pain improve to 4/10 at max with walking for 20

## 2023-07-26 ENCOUNTER — OFFICE VISIT (OUTPATIENT)
Dept: ORTHOPEDIC SURGERY | Age: 68
End: 2023-07-26

## 2023-07-26 ENCOUNTER — HOSPITAL ENCOUNTER (OUTPATIENT)
Dept: PHYSICAL THERAPY | Age: 68
Setting detail: THERAPIES SERIES
Discharge: HOME | End: 2023-07-26
Payer: MEDICARE

## 2023-07-26 VITALS — WEIGHT: 180 LBS | HEIGHT: 65 IN | BODY MASS INDEX: 29.99 KG/M2

## 2023-07-26 DIAGNOSIS — Z96.652 S/P TOTAL KNEE ARTHROPLASTY, LEFT: Primary | ICD-10-CM

## 2023-07-26 DIAGNOSIS — E11.9 TYPE 2 DIABETES MELLITUS WITHOUT COMPLICATION, WITHOUT LONG-TERM CURRENT USE OF INSULIN (HCC): ICD-10-CM

## 2023-07-26 PROCEDURE — 97110 THERAPEUTIC EXERCISES: CPT

## 2023-07-26 PROCEDURE — 97016 VASOPNEUMATIC DEVICE THERAPY: CPT

## 2023-07-26 RX ORDER — METFORMIN HYDROCHLORIDE 500 MG/1
TABLET, EXTENDED RELEASE ORAL
Qty: 180 TABLET | Refills: 5 | Status: SHIPPED | OUTPATIENT
Start: 2023-07-26

## 2023-07-26 NOTE — PROGRESS NOTES
[address] [facility]  [address] [address]  Dept: [phone]  Dept Fax: [phone]        Ambulatory Follow Up    Subjective:   Date of surgery: 5/2/2023 - L TKA    Na Haley is a 78 y.o. female who presents to our office today for routine followup nearly 3 months post-op from left total knee arthroplasty. She is doing excellent. She is continue with physical therapy and home exercises. She has completed her ASA therapy. She is very happy with the results. Patient is taking tylenol primarily, for pain medication. Chief Complaint   Patient presents with    Post-Op Check     5/2/23 L TKA     HPI    Review of Systems   Constitutional: Negative for fever and chills. HENT: Negative for congestion. Eyes: Negative for blurred vision and double vision. Respiratory: Negative for cough, shortness of breath and wheezing. Cardiovascular: Negative for chest pain and palpitations. Gastrointestinal: Negative for nausea. Negative for vomiting. Musculoskeletal: See HPI. Skin: Negative for itching and rash. Neurological: Negative for dizziness, sensory change and headaches. Psychiatric/Behavioral: Negative for depression and suicidal ideas. Objective :   General: AAOx3, NAD, appears stated age  Ortho Exam  CV: no obvious JVD, no dependent edema, distal pulses 2+  Respiratory: chest rise symmetric, unlabored respirations, no audible wheezing  Skin: warm, well perfused, no obvious rashes or lesions  Psych: Patient displays understanding of exam, diagnosis, and plan. LLE: Surgical incision looks excellent, there is no dehiscence, erythema or drainage. Knee range of motion 0 to 110 degrees actively. Compartments soft. 2+ DP pulse. TA/EHL/FHL/GS motor intact. Deep and Superficial Peroneal/Saphenous/Sural SILT. Radiology:   No new XRs today. Assessment:      1.  S/P total knee arthroplasty, left         Plan:

## 2023-07-26 NOTE — FLOWSHEET NOTE
sets - 10 reps  - Standing Hip Abduction with Anchored Resistance  - 1 x daily - 7 x weekly - 3 sets - 10 reps  - Standing Hip Adduction with Anchored Resistance  - 1 x daily - 7 x weekly - 3 sets - 10 reps  - Standing Hip Flexion with Anchored Resistance  - 1 x daily - 7 x weekly - 3 sets - 10 reps  - Side Stepping with Resistance at Thighs  - 1 x daily - 7 x weekly - 3 sets - 10 reps  - Standard Lunge  - 1 x daily - 7 x weekly - 3 sets - 10 reps  - Lateral Lunge  - 1 x daily - 7 x weekly - 3 sets - 10 reps  - Single Leg Squat with Chair Touch  - 1 x daily - 7 x weekly - 3 sets - 10 reps  - Sitting Knee Extension with Resistance  - 1 x daily - 7 x weekly - 3 sets - 10 reps    Plan: [x] Continue current frequency toward long and short term goals. [x] Frequency:  3 x/week for 30 total visits, decreasing to twice weekly.   [x] Specific Instructions for subsequent treatments: follow TKA protocol for left knee:  Focus on ROM/quad stretch over remaining visits          Time In: 0049         Time Out: 1502      Electronically signed by:  Rosenda Asif PTA

## 2023-07-26 NOTE — TELEPHONE ENCOUNTER
Clora Duverney Dysard is calling to request a refill on the following medication(s):    Medication Request:  Requested Prescriptions     Pending Prescriptions Disp Refills    metFORMIN (GLUCOPHAGE-XR) 500 MG extended release tablet [Pharmacy Med Name: METFORMIN HCL ER TABS 500MG] 180 tablet 5     Sig: TAKE 2 TABLETS EVERY MORNING BEFORE BREAKFAST AND 1 TABLET WITH SUPPER DAILY       Last Visit Date (If Applicable):  0/2/6360    Next Visit Date:    11/2/2023

## 2023-08-01 ENCOUNTER — HOSPITAL ENCOUNTER (OUTPATIENT)
Dept: PHYSICAL THERAPY | Age: 68
Setting detail: THERAPIES SERIES
Discharge: HOME | End: 2023-08-01
Payer: MEDICARE

## 2023-08-01 PROCEDURE — 97110 THERAPEUTIC EXERCISES: CPT

## 2023-08-01 PROCEDURE — 97016 VASOPNEUMATIC DEVICE THERAPY: CPT

## 2023-08-01 NOTE — FLOWSHEET NOTE
[x] [facility] ([facility]) [facility]  MED CENTER &  Therapy  [address].  [phone]  F: [phone]     Physical Therapy Daily Treatment Note    Date:  2023  Patient Name:  Keyla Haley      :  1955   MRN: [id number]  Physician: Dr. Rikki Gray, DO                            Insurance: [employer] Medicare Advantage, [facility]  Medical Diagnosis: H60.163 (ICD-10-CM) - Status post surgery; M17.12 (ICD-10-CM) - Primary osteoarthritis of left knee  Rehab Codes: M 25.562, M 25.662, M 62.81, R 26.89, R 60.0  Onset date: 23                   Next 's appt.: 23      Visit# / total visits:   Medicare progress note due by visit 40  Cancels/No Shows: 0/0      Subjective:    Pain:  [x] Yes  [] No Location: L knee   Pain Rating: (0-10 scale) 2/10  Pain altered Tx:  [x] No  [] Yes  Action:  Comments:        Objective:  Modalities:  vasopneumatic cold/compression, 15 minutes, 38 degrees, moderate pressure, to left knee with leg on bolster, after exs. No issues wearing heel lift. Precautions:  Exercises:  Exercise Reps/ Time Weight/ Level Completed 23  Comments   Treadmill 12 min 1.5 mph  0.8 mph  6 min fwd/6 min bkwd   Stationary bike 5 min timer X Able to make full revolution.   Seat on 7                 Standing       Calf Stretch on Incline 3x30\"  X    Hamstring Stretch on Step 3x30\"  X    Knee Flexion Stretch 10x10\"  X    Calf Raises on Incline 2x10 2 lb     DF Raises on Incline 2x10 2 lb     SLS 3x30\"   UE Support prn - slight knee bend for improved quad activation    4 - Way Hip 20x ea Lime X Increased reps 23   Step up fwd 20x 8 inch  2 lb     Step up lateral 20x 8 inch  2 lb     Step down 2x10 4 inch  2 lb  4\" step focus on technique   Deep Lunge onto Mat 10x ea  X Added side lunge 23  Fwd/lat   Squat  10x ea 10 lb  15 lb  6 inch X Added 23   HS Curl 2x10 2 lb     March  20 x ea 2 lb     TKE 20x5\" Plum            Monster Walk - Resisted

## 2023-08-07 ENCOUNTER — HOSPITAL ENCOUNTER (OUTPATIENT)
Dept: PHYSICAL THERAPY | Age: 68
Setting detail: THERAPIES SERIES
Discharge: HOME | End: 2023-08-07
Payer: MEDICARE

## 2023-08-07 PROCEDURE — 97110 THERAPEUTIC EXERCISES: CPT

## 2023-08-07 PROCEDURE — 97016 VASOPNEUMATIC DEVICE THERAPY: CPT

## 2023-08-07 NOTE — FLOWSHEET NOTE
[x] [facility] ([facility]) - [facility] &  Therapy  [address].  [phone]  F: [phone]     Physical Therapy Daily Treatment Note    Date:  2023  Patient Name:  Rashid Haley      :  1955   MRN: [id number]  Physician: Dr. Claudy Schumacher DO                            Insurance: [employer] [place] Medicare Advantage, [facility]  Medical Diagnosis: N86.489 (ICD-10-CM) - Status post surgery; M17.12 (ICD-10-CM) - Primary osteoarthritis of left knee  Rehab Codes: M 25.562, M 25.662, M 62.81, R 26.89, R 60.0  Onset date: 23                   Next 's appt. : ~ 2024      Visit# / total visits:   Medicare progress note due by visit 40  Cancels/No Shows: 0/0      Subjective:    Pain:  [x] Yes  [] No Location: L knee   Pain Rating: (0-10 scale) 1/10  Pain altered Tx:  [x] No  [] Yes  Action:  Comments: very minimal pain today, no new issues to report. Objective:  Modalities:  vasopneumatic cold/compression, 15 minutes, 38 degrees, moderate pressure, to left knee with leg on bolster, after exs. No issues wearing heel lift. Precautions:  Exercises:  Exercise Reps/ Time Weight/ Level Completed 23  Comments   Treadmill 12 min 1.5 mph  0.8 mph  6 min fwd/6 min bkwd   Stationary bike 5 min timer X Able to make full revolution.   Seat on 7                 Standing       Calf Stretch on Incline 3x30\"  X    Hamstring Stretch on Step 3x30\"  X    Knee Flexion Stretch 10x10\"  X    Calf Raises on Incline 2x10 2 lb     DF Raises on Incline 2x10 2 lb     SLS 3x30\"   UE Support prn - slight knee bend for improved quad activation    4 - Way Hip 20x ea Lime X Increased reps 23   Step up fwd 20x 8 inch  2 lb X    Step up lateral 20x 8 inch  2 lb X    Step down 2x10 4 inch  2 lb X 4\" step focus on technique   Deep Lunge onto Mat 10x ea  X Kneeling practice - fall recovery    Squat  10x ea 10 lb  15 lb  6 inch X Added 23   HS Curl 2x10 2 lb      x ea 2 lb

## 2023-08-08 NOTE — DISCHARGE SUMMARY
ambulate up/down 10 steps with one handrail with reciprocal gait pattern without difficulty -- Using a lot of arm support; inconsistent with gait pattern (at times reciprocal, at other times step together). LEFS score improve to 30% functionally impaired or less -- improvement made to 49% functionally impaired. LTG (to be met in 30 visits): UPDATED 7/24/23  Left knee pain improve to 2/10 at max with going up/down steps reciprocally -- Met, pain 2/10  Left knee extension improve to 0 degrees AROM -- improvement to lacking 4 degrees extension  Left knee supine flexion AROM improve to 115 degrees, 125 degrees AAROM  -- Improvement to 111 degrees flexion  Able to complete 15 reps of SLR without quad lag  --Able to complete 15 reps without lag  Patient able to ambulate up/down 10 steps with one handrail with reciprocal gait pattern without difficulty -- Met - some reliance on arm/handrail  LEFS score improve to 30% functionally impaired or less  -- 38% functionally impaired. Pain improve to 1/10 at max with resting at all times. -- Met, pain 1/10 or less at rest or while walking flat surfaces. Treatment to Date:  [x] Therapeutic Exercise    [] Modalities:  [x] Therapeutic Activity    [] Ultrasound  [] Electrical Stimulation  [x] Gait Training     [] Massage       [] Lumbar/Cervical Traction  [] Neuromuscular Re-education [] Cold/hotpack [] Iontophoresis: 4 mg/mL  [x] Instruction in Home Exercise Program                     Dexamethasone Sodium  [x] Manual Therapy             Phosphate 40-80 mAmin  [] Aquatic Therapy                   [x] Vasocompression/    [] Other:             Game Ready    Discharge Status:     [x] Pt recovered from conditions. Treatment goals were met. [x] Pt received maximum benefit. No further therapy indicated at this time. [x] Pt to continue exercise/home instructions independently.     [] Therapy interrupted due to:    [] Pt has 2 or more no shows/cancels, is

## 2023-10-11 NOTE — TELEPHONE ENCOUNTER
Andrés Haley is calling to request a refill on the following medication(s):    Last Visit Date (If Applicable):  6/7/4578    Next Visit Date:    11/2/2023    Medication Request:  Requested Prescriptions     Pending Prescriptions Disp Refills    SITagliptin (JANUVIA) 100 MG tablet 90 tablet 3     Sig: TAKE 1 TABLET DAILY

## 2023-10-25 ENCOUNTER — HOSPITAL ENCOUNTER (OUTPATIENT)
Age: 68
Setting detail: SPECIMEN
Discharge: HOME | End: 2023-10-25

## 2023-10-25 DIAGNOSIS — E78.5 DYSLIPIDEMIA: ICD-10-CM

## 2023-10-25 DIAGNOSIS — E11.9 TYPE 2 DIABETES MELLITUS WITHOUT COMPLICATION, WITHOUT LONG-TERM CURRENT USE OF INSULIN (HCC): ICD-10-CM

## 2023-10-25 LAB
ALBUMIN SERPL-MCNC: 3.8 G/DL (ref 3.5–5.2)
ALBUMIN/GLOB SERPL: 1.2 {RATIO} (ref 1–2.5)
ALP SERPL-CCNC: 81 U/L (ref 35–104)
ALT SERPL-CCNC: 25 U/L (ref 5–33)
ANION GAP SERPL CALCULATED.3IONS-SCNC: 11 MMOL/L (ref 9–17)
AST SERPL-CCNC: 16 U/L
BILIRUB SERPL-MCNC: 0.4 MG/DL (ref 0.3–1.2)
BUN SERPL-MCNC: 12 MG/DL (ref 8–23)
CALCIUM SERPL-MCNC: 9 MG/DL (ref 8.6–10.4)
CHLORIDE SERPL-SCNC: 105 MMOL/L (ref 98–107)
CHOLEST SERPL-MCNC: 194 MG/DL
CHOLESTEROL/HDL RATIO: 3.4
CO2 SERPL-SCNC: 24 MMOL/L (ref 20–31)
CREAT SERPL-MCNC: 0.7 MG/DL (ref 0.5–0.9)
EST. AVERAGE GLUCOSE BLD GHB EST-MCNC: 157 MG/DL
GFR SERPL CREATININE-BSD FRML MDRD: >60 ML/MIN/1.73M2
GLUCOSE SERPL-MCNC: 122 MG/DL (ref 70–99)
HBA1C MFR BLD: 7.1 % (ref 4–6)
HDLC SERPL-MCNC: 57 MG/DL
LDLC SERPL CALC-MCNC: 109 MG/DL (ref 0–130)
POTASSIUM SERPL-SCNC: 4.4 MMOL/L (ref 3.7–5.3)
PROT SERPL-MCNC: 6.9 G/DL (ref 6.4–8.3)
SODIUM SERPL-SCNC: 140 MMOL/L (ref 135–144)
TRIGL SERPL-MCNC: 141 MG/DL

## 2023-10-31 SDOH — HEALTH STABILITY: PHYSICAL HEALTH: ON AVERAGE, HOW MANY MINUTES DO YOU ENGAGE IN EXERCISE AT THIS LEVEL?: 40 MIN

## 2023-10-31 SDOH — HEALTH STABILITY: PHYSICAL HEALTH: ON AVERAGE, HOW MANY DAYS PER WEEK DO YOU ENGAGE IN MODERATE TO STRENUOUS EXERCISE (LIKE A BRISK WALK)?: 2 DAYS

## 2023-10-31 ASSESSMENT — LIFESTYLE VARIABLES
HOW OFTEN DO YOU HAVE A DRINK CONTAINING ALCOHOL: 2-3 TIMES A WEEK
HOW MANY STANDARD DRINKS CONTAINING ALCOHOL DO YOU HAVE ON A TYPICAL DAY: 1
HOW OFTEN DO YOU HAVE A DRINK CONTAINING ALCOHOL: 4
HOW OFTEN DO YOU HAVE SIX OR MORE DRINKS ON ONE OCCASION: 1
HOW MANY STANDARD DRINKS CONTAINING ALCOHOL DO YOU HAVE ON A TYPICAL DAY: 1 OR 2

## 2023-10-31 ASSESSMENT — PATIENT HEALTH QUESTIONNAIRE - PHQ9
SUM OF ALL RESPONSES TO PHQ QUESTIONS 1-9: 0
SUM OF ALL RESPONSES TO PHQ QUESTIONS 1-9: 0
SUM OF ALL RESPONSES TO PHQ9 QUESTIONS 1 & 2: 0
1. LITTLE INTEREST OR PLEASURE IN DOING THINGS: 0
SUM OF ALL RESPONSES TO PHQ QUESTIONS 1-9: 0
SUM OF ALL RESPONSES TO PHQ QUESTIONS 1-9: 0
2. FEELING DOWN, DEPRESSED OR HOPELESS: 0

## 2023-11-02 ENCOUNTER — OFFICE VISIT (OUTPATIENT)
Dept: FAMILY MEDICINE CLINIC | Age: 68
End: 2023-11-02
Payer: MEDICARE

## 2023-11-02 VITALS
OXYGEN SATURATION: 98 % | WEIGHT: 181 LBS | HEART RATE: 75 BPM | SYSTOLIC BLOOD PRESSURE: 120 MMHG | DIASTOLIC BLOOD PRESSURE: 74 MMHG | BODY MASS INDEX: 30.12 KG/M2

## 2023-11-02 DIAGNOSIS — E11.9 TYPE 2 DIABETES MELLITUS WITHOUT COMPLICATION, WITHOUT LONG-TERM CURRENT USE OF INSULIN (HCC): ICD-10-CM

## 2023-11-02 DIAGNOSIS — Z96.651 TOTAL KNEE REPLACEMENT STATUS, RIGHT: ICD-10-CM

## 2023-11-02 DIAGNOSIS — Z96.652 S/P TOTAL KNEE ARTHROPLASTY, LEFT: ICD-10-CM

## 2023-11-02 DIAGNOSIS — Z00.00 WELCOME TO MEDICARE PREVENTIVE VISIT: ICD-10-CM

## 2023-11-02 DIAGNOSIS — Z71.89 ACP (ADVANCE CARE PLANNING): ICD-10-CM

## 2023-11-02 DIAGNOSIS — H91.93 BILATERAL HEARING LOSS, UNSPECIFIED HEARING LOSS TYPE: ICD-10-CM

## 2023-11-02 DIAGNOSIS — Z23 NEEDS FLU SHOT: Primary | ICD-10-CM

## 2023-11-02 DIAGNOSIS — E78.5 DYSLIPIDEMIA: ICD-10-CM

## 2023-11-02 DIAGNOSIS — E55.9 VITAMIN D DEFICIENCY: ICD-10-CM

## 2023-11-02 PROBLEM — D72.829 LEUKOCYTOSIS: Status: RESOLVED | Noted: 2019-04-10 | Resolved: 2023-11-02

## 2023-11-02 PROBLEM — R79.89 ELEVATED LFTS: Status: RESOLVED | Noted: 2021-03-15 | Resolved: 2023-11-02

## 2023-11-02 PROCEDURE — 90694 VACC AIIV4 NO PRSRV 0.5ML IM: CPT | Performed by: FAMILY MEDICINE

## 2023-11-02 PROCEDURE — 3051F HG A1C>EQUAL 7.0%<8.0%: CPT | Performed by: FAMILY MEDICINE

## 2023-11-02 PROCEDURE — 1123F ACP DISCUSS/DSCN MKR DOCD: CPT | Performed by: FAMILY MEDICINE

## 2023-11-02 PROCEDURE — G0008 ADMIN INFLUENZA VIRUS VAC: HCPCS | Performed by: FAMILY MEDICINE

## 2023-11-02 PROCEDURE — G0402 INITIAL PREVENTIVE EXAM: HCPCS | Performed by: FAMILY MEDICINE

## 2023-11-02 RX ORDER — METFORMIN HYDROCHLORIDE 500 MG/1
1000 TABLET, EXTENDED RELEASE ORAL
Qty: 180 TABLET | Refills: 3 | Status: SHIPPED | OUTPATIENT
Start: 2023-11-02

## 2023-11-03 ENCOUNTER — CLINICAL DOCUMENTATION (OUTPATIENT)
Dept: SPIRITUAL SERVICES | Age: 68
End: 2023-11-03

## 2023-11-03 NOTE — ACP (ADVANCE CARE PLANNING)
Advance Care Planning   Ambulatory ACP Specialist Patient Outreach    Date:  11/3/2023    ACP Specialist:  Marilu Peres    Outreach call to patient in follow-up to ACP Specialist referral from:Jones, Robert Dakins, DO    [x] PCP  [] Provider   [] Ambulatory Care Management [] Other     For:                  [x] Advance Directive Assistance              [] Complete Portable DNR order              [] Complete POST/POLST/MOST              [] Code Status Discussion             [] Discuss Goals of Care             [] Early ACP Decision-Making              [] Other (Specify)    Date Referral Received:11/2/23    Next Step:   [] ACP scheduled conversation  [x] Outreach again in one week               [] Email / Mail [address]  [] Email / Mail Advance Directive   [] Closing referral.  Routing closure to referring provider/staff and to ACP Specialist . [] Closure letter mailed to patient with invitation to contact ACP Specialist if / when ready. [] Other (Specify here):         [x] At this time, Healthcare Decision Maker Is:  Advance Care Planning   Healthcare Decision Maker:    Primary Decision Maker: Kartik Haley - [facility] - [phone]    Click here to complete Healthcare Decision Makers including selection of the Healthcare Decision Maker Relationship (ie \"Primary\"). [] Primary agent named in scanned advance directive. [x] Legal Next of Kin. [] Unable to determine legal decision maker at this time. Outreaches:       [x] 1st -  Date:  11/3/23               Intervention:  [] Spoke with Patient   [x] Left Voice mail [] Email / Mail    [] Wisrt  [] Other [id number] : Outcomes:Left detailed VM for Patient to return call. Will make another outreach in 1 week. [] 2nd -  Date:                 Intervention:  [] Spoke with Patient  [] Left Voice mail [] Email / Mail    [] Geoforcehart  [] Other [phone]) :               Outcomes:                [] 3rd -  Date:

## 2024-01-17 ENCOUNTER — CLINICAL DOCUMENTATION (OUTPATIENT)
Dept: SPIRITUAL SERVICES | Age: 69
End: 2024-01-17

## 2024-01-17 NOTE — ACP (ADVANCE CARE PLANNING)
Advance Care Planning   Ambulatory ACP Specialist Patient Outreach    Date:  1/17/2024    ACP Specialist:  Tatiana Desouza    Outreach call to patient in follow-up to ACP Specialist referral from:Dante Kelly DO    [x] PCP  [] Provider   [] Ambulatory Care Management [] Other     For:                  [x] Advance Directive Assistance              [] Complete Portable DNR order              [] Complete POST/POLST/MOST              [] Code Status Discussion             [] Discuss Goals of Care             [] Early ACP Decision-Making              [] Other (Specify)    Date Referral Received: 11/2/2023    Next Step:   [x] ACP scheduled conversation  [] Outreach again in one week               [x] Email / Mail ACP Info Sheets  [x] Email / Mail Advance Directive   [] Closing referral.  Routing closure to referring provider/staff and to ACP Specialist .    [] Closure letter mailed to patient with invitation to contact ACP Specialist if / when ready.   [] Other (Specify here):         [x] At this time, Healthcare Decision Maker Is:    Advance Care Planning   Healthcare Decision Maker:    Primary Decision Maker: Kartik Haley - [facility] - [phone]      [] Primary agent named in scanned advance directive.    [x] Legal Next of Kin.     [] Unable to determine legal decision maker at this time.       Outreaches:            [x]  Additional Outreach -  Date:  1/17/2024       Outcomes: Per  and continuity of care, attempted ACP outreach to this patient by phone call to the one number listed for both - patient's home and mobile in follow-up to our referral received on 11/2/2023 to follow up on previously planned patient outreaches by ACP Team.  Spoke to patient.  Patient is agreeable to a conversation with ACP Specialist Anna Alan on Tuesday, 1/23/2024 at 11:00 AM.  A copy of [place] [employer] forms and ACP information sheets sent to patient's confirmed email address on file with ACP Specialist

## 2024-01-23 ENCOUNTER — CLINICAL DOCUMENTATION (OUTPATIENT)
Dept: SPIRITUAL SERVICES | Age: 69
End: 2024-01-23

## 2024-01-23 NOTE — ACP (ADVANCE CARE PLANNING)
Advance Care Planning     Advance Care Planning Clinical Specialist  Conversation Note      Date of ACP Conversation: 1/23/2024    Conversation Conducted with: Patient with Decision Making Capacity   Healthcare Decision Maker: Next of Kin by law (only applies in absence of above) (name) Kartik Haley, spouse     ACP Clinical Specialist: ASHLEY Salazar    Healthcare Decision Maker:     Current Designated Healthcare Decision Maker:     Primary Decision Maker: Kartik Haley - Spouse - [phone]    Secondary Decision Maker: Alonzo Haley - Child - [phone]    Supplemental (Other) Decision Maker: Lele Haley - Child - [phone]    Today we assisted pt with completing HCPOA document today as pt named HCDM as outlined above.     Care Preferences      Ventilation:  \"If you were in your present state of health and suddenly became very ill and were unable to breathe on your own, what would your preference be about the use of a ventilator (breathing machine) if it were available to you?\"      If the patient would desire the use of ventilator (breathing machine), answer \"yes\".  If not, \"no\": yes, it was explained to pt when living will is applicable.    \"If your health worsens and it becomes clear that your chance of recovery is unlikely, what would your preference be about the use of a ventilator (breathing machine) if it were available to you?\"     Would the patient desire the use of ventilator (breathing machine)?: No-assisted pt with completing living will.       Resuscitation  \"CPR works best to restart the heart when there is a sudden event, like a heart attack, in someone who is otherwise healthy. Unfortunately, CPR does not typically restart the heart for people who have serious health conditions or who are very sick.\"    \"In the event your heart stopped as a result of an underlying serious health condition, would you want attempts to be made to restart your heart (answer \"yes\" for attempt to resuscitate)

## 2024-03-18 DIAGNOSIS — E78.5 DYSLIPIDEMIA: ICD-10-CM

## 2024-03-18 RX ORDER — ATORVASTATIN CALCIUM 40 MG/1
TABLET, FILM COATED ORAL
Qty: 90 TABLET | Refills: 3 | Status: SHIPPED | OUTPATIENT
Start: 2024-03-18

## 2024-03-18 NOTE — TELEPHONE ENCOUNTER
Ila Haley is calling to request a refill on the following medication(s):    Medication Request:  Requested Prescriptions     Pending Prescriptions Disp Refills    atorvastatin (LIPITOR) 40 MG tablet [Pharmacy Med Name: ATORVASTATIN TABS 40MG] 90 tablet 3     Sig: TAKE 1 TABLET DAILY       Last Visit Date (If Applicable):  11/2/2023    Next Visit Date:    5/2/2024            \f

## 2024-04-09 ENCOUNTER — TELEPHONE (OUTPATIENT)
Dept: FAMILY MEDICINE CLINIC | Age: 69
End: 2024-04-09

## 2024-04-16 ENCOUNTER — TELEPHONE (OUTPATIENT)
Dept: FAMILY MEDICINE CLINIC | Age: 69
End: 2024-04-16

## 2024-04-16 DIAGNOSIS — Z12.31 BREAST CANCER SCREENING BY MAMMOGRAM: ICD-10-CM

## 2024-04-16 DIAGNOSIS — Z12.31 ENCOUNTER FOR SCREENING MAMMOGRAM FOR MALIGNANT NEOPLASM OF BREAST: Primary | ICD-10-CM

## 2024-04-17 ENCOUNTER — HOSPITAL ENCOUNTER (OUTPATIENT)
Dept: MAMMOGRAPHY | Age: 69
Discharge: HOME | End: 2024-04-19
Payer: MEDICARE

## 2024-04-17 VITALS — BODY MASS INDEX: 30.16 KG/M2 | HEIGHT: 65 IN | WEIGHT: 181 LBS

## 2024-04-17 DIAGNOSIS — Z12.31 ENCOUNTER FOR SCREENING MAMMOGRAM FOR MALIGNANT NEOPLASM OF BREAST: ICD-10-CM

## 2024-04-17 PROCEDURE — 77063 BREAST TOMOSYNTHESIS BI: CPT

## 2024-04-26 ENCOUNTER — HOSPITAL ENCOUNTER (OUTPATIENT)
Age: 69
Setting detail: SPECIMEN
Discharge: HOME | End: 2024-04-26

## 2024-04-26 DIAGNOSIS — E11.9 TYPE 2 DIABETES MELLITUS WITHOUT COMPLICATION, WITHOUT LONG-TERM CURRENT USE OF INSULIN (HCC): ICD-10-CM

## 2024-04-26 DIAGNOSIS — E78.5 DYSLIPIDEMIA: ICD-10-CM

## 2024-04-26 DIAGNOSIS — E55.9 VITAMIN D DEFICIENCY: ICD-10-CM

## 2024-04-26 LAB
25(OH)D3 SERPL-MCNC: 13.7 NG/ML (ref 30–100)
ALBUMIN SERPL-MCNC: 4.3 G/DL (ref 3.5–5.2)
ALBUMIN/GLOB SERPL: 1 {RATIO} (ref 1–2.5)
ALP SERPL-CCNC: 102 U/L (ref 35–104)
ALT SERPL-CCNC: 35 U/L (ref 10–35)
ANION GAP SERPL CALCULATED.3IONS-SCNC: 14 MMOL/L (ref 9–16)
AST SERPL-CCNC: 25 U/L (ref 10–35)
BILIRUB SERPL-MCNC: 0.7 MG/DL (ref 0–1.2)
BUN SERPL-MCNC: 14 MG/DL (ref 8–23)
CALCIUM SERPL-MCNC: 9.5 MG/DL (ref 8.6–10.4)
CHLORIDE SERPL-SCNC: 104 MMOL/L (ref 98–107)
CHOLEST SERPL-MCNC: 223 MG/DL (ref 0–199)
CHOLESTEROL/HDL RATIO: 4
CO2 SERPL-SCNC: 21 MMOL/L (ref 20–31)
CREAT SERPL-MCNC: 0.8 MG/DL (ref 0.5–0.9)
EST. AVERAGE GLUCOSE BLD GHB EST-MCNC: 189 MG/DL
GFR SERPL CREATININE-BSD FRML MDRD: 76 ML/MIN/1.73M2
GLUCOSE SERPL-MCNC: 170 MG/DL (ref 74–99)
HBA1C MFR BLD: 8.2 % (ref 4–6)
HDLC SERPL-MCNC: 54 MG/DL
LDLC SERPL CALC-MCNC: 131 MG/DL (ref 0–100)
POTASSIUM SERPL-SCNC: 4.3 MMOL/L (ref 3.7–5.3)
PROT SERPL-MCNC: 7.5 G/DL (ref 6.6–8.7)
SODIUM SERPL-SCNC: 139 MMOL/L (ref 136–145)
TRIGL SERPL-MCNC: 187 MG/DL
VLDLC SERPL CALC-MCNC: 37 MG/DL

## 2024-04-30 SDOH — ECONOMIC STABILITY: INCOME INSECURITY: HOW HARD IS IT FOR YOU TO PAY FOR THE VERY BASICS LIKE FOOD, HOUSING, MEDICAL CARE, AND HEATING?: NOT HARD AT ALL

## 2024-04-30 SDOH — ECONOMIC STABILITY: FOOD INSECURITY: WITHIN THE PAST 12 MONTHS, YOU WORRIED THAT YOUR FOOD WOULD RUN OUT BEFORE YOU GOT MONEY TO BUY MORE.: NEVER TRUE

## 2024-04-30 SDOH — ECONOMIC STABILITY: FOOD INSECURITY: WITHIN THE PAST 12 MONTHS, THE FOOD YOU BOUGHT JUST DIDN'T LAST AND YOU DIDN'T HAVE MONEY TO GET MORE.: NEVER TRUE

## 2024-04-30 ASSESSMENT — PATIENT HEALTH QUESTIONNAIRE - PHQ9
1. LITTLE INTEREST OR PLEASURE IN DOING THINGS: NOT AT ALL
SUM OF ALL RESPONSES TO PHQ QUESTIONS 1-9: 0
2. FEELING DOWN, DEPRESSED OR HOPELESS: NOT AT ALL
SUM OF ALL RESPONSES TO PHQ QUESTIONS 1-9: 0
SUM OF ALL RESPONSES TO PHQ9 QUESTIONS 1 & 2: 0
SUM OF ALL RESPONSES TO PHQ QUESTIONS 1-9: 0
SUM OF ALL RESPONSES TO PHQ QUESTIONS 1-9: 0

## 2024-05-02 ASSESSMENT — PATIENT HEALTH QUESTIONNAIRE - PHQ9
1. LITTLE INTEREST OR PLEASURE IN DOING THINGS: NOT AT ALL
2. FEELING DOWN, DEPRESSED OR HOPELESS: NOT AT ALL
SUM OF ALL RESPONSES TO PHQ9 QUESTIONS 1 & 2: 0

## 2024-05-06 ENCOUNTER — OFFICE VISIT (OUTPATIENT)
Dept: FAMILY MEDICINE CLINIC | Age: 69
End: 2024-05-06
Payer: MEDICARE

## 2024-05-06 VITALS
HEART RATE: 76 BPM | WEIGHT: 183 LBS | OXYGEN SATURATION: 96 % | BODY MASS INDEX: 30.45 KG/M2 | DIASTOLIC BLOOD PRESSURE: 62 MMHG | SYSTOLIC BLOOD PRESSURE: 124 MMHG

## 2024-05-06 DIAGNOSIS — Z96.652 S/P TOTAL KNEE ARTHROPLASTY, LEFT: ICD-10-CM

## 2024-05-06 DIAGNOSIS — Z96.651 TOTAL KNEE REPLACEMENT STATUS, RIGHT: ICD-10-CM

## 2024-05-06 DIAGNOSIS — E11.65 TYPE 2 DIABETES MELLITUS WITH HYPERGLYCEMIA, WITHOUT LONG-TERM CURRENT USE OF INSULIN (HCC): Primary | ICD-10-CM

## 2024-05-06 DIAGNOSIS — E78.5 DYSLIPIDEMIA: ICD-10-CM

## 2024-05-06 PROCEDURE — 3052F HG A1C>EQUAL 8.0%<EQUAL 9.0%: CPT | Performed by: FAMILY MEDICINE

## 2024-05-06 PROCEDURE — 1123F ACP DISCUSS/DSCN MKR DOCD: CPT | Performed by: FAMILY MEDICINE

## 2024-05-06 PROCEDURE — 99214 OFFICE O/P EST MOD 30 MIN: CPT | Performed by: FAMILY MEDICINE

## 2024-05-06 RX ORDER — METFORMIN HYDROCHLORIDE 500 MG/1
1500 TABLET, EXTENDED RELEASE ORAL
Qty: 270 TABLET | Refills: 3
Start: 2024-05-06 | End: 2024-05-06

## 2024-05-06 RX ORDER — METFORMIN HYDROCHLORIDE 500 MG/1
1500 TABLET, EXTENDED RELEASE ORAL
Qty: 270 TABLET | Refills: 3 | Status: SHIPPED | OUTPATIENT
Start: 2024-05-06

## 2024-05-06 RX ORDER — AMOXICILLIN 500 MG/1
CAPSULE ORAL
COMMUNITY
Start: 2024-04-26

## 2024-05-06 ASSESSMENT — ENCOUNTER SYMPTOMS
ALLERGIC/IMMUNOLOGIC NEGATIVE: 1
EYES NEGATIVE: 1
SHORTNESS OF BREATH: 0
VISUAL CHANGE: 0
RESPIRATORY NEGATIVE: 1
BLURRED VISION: 0
GASTROINTESTINAL NEGATIVE: 1

## 2024-05-06 NOTE — ASSESSMENT & PLAN NOTE
Uncontrolled, changes made today: increase Metformin to 1500mg daily, medication adherence emphasized, and lifestyle modifications recommended

## 2024-05-06 NOTE — PATIENT INSTRUCTIONS
with your baked potato instead of sour cream, butter, cheese, or shane.  Where can you learn more?  Go to https://[email or web address].com.net/patientEd and enter W495 to learn more about \"Learning About Low-Fat Eating.\"  Current as of: October 24, 2023               Content Version: 14.0  © 2372-8176 [employer].   Care instructions adapted under license by [employer]. If you have questions about a medical condition or this instruction, always ask your healthcare professional. [employer] disclaims any warranty or liability for your use of this information.          Detail Level: Detailed Detail Level: Generalized

## 2024-05-06 NOTE — PROGRESS NOTES
[facility] Progress Note    Date:5/6/2024         Patient Name:Ila Haley     YOB: 1955     Age:68 y.o.    Assessment/Plan        Problem List Items Addressed This Visit          Endocrine    Type 2 diabetes mellitus with hyperglycemia, without long-term current use of insulin (HCC) - Primary      Uncontrolled, changes made today: increase Metformin to 1500mg daily, medication adherence emphasized, and lifestyle modifications recommended         Relevant Medications    metFORMIN (GLUCOPHAGE-XR) 500 MG extended release tablet    Other Relevant Orders    Hemoglobin A1C       Other    Dyslipidemia      Borderline controlled, continue current medications, continue current treatment plan, medication adherence emphasized, and lifestyle modifications recommended         Total knee replacement status, right      Monitored by specialist- no acute findings meriting change in the plan         S/P total knee arthroplasty, left      Monitored by specialist- no acute findings meriting change in the plan             Return in about 6 months (around 11/6/2024).    Electronically signed by [place] Kelly DO on 5/6/24       Total time spent was between Time personally spent assessing and managing the patient on the date of service: Est: 30-39 minutes (97004) mins. This included time spent reviewing the patient's medical record (e.g., recent visits, labs, and studies); seeing the patient in the office (face-to-face time); ordering medications, studies, procedures, or referrals; calling the patient or family later in the day with results and further recommendations; and documenting the visit in the medical record.     Subjective     Ila Haley is a 68 y.o. female presenting today for   Chief Complaint   Patient presents with    Diabetes   .    Ila Haley is an 67 y.o. female who presents with arthralgias that began several months ago. Pain is located in the left knee(s). The pain is described as constant,

## 2024-05-07 DIAGNOSIS — Z96.652 S/P TOTAL KNEE ARTHROPLASTY, LEFT: Primary | ICD-10-CM

## 2024-05-09 ENCOUNTER — OFFICE VISIT (OUTPATIENT)
Dept: ORTHOPEDIC SURGERY | Age: 69
End: 2024-05-09

## 2024-05-09 VITALS — WEIGHT: 183 LBS | HEIGHT: 65 IN | RESPIRATION RATE: 14 BRPM | BODY MASS INDEX: 30.49 KG/M2

## 2024-05-09 DIAGNOSIS — Z96.652 S/P TOTAL KNEE ARTHROPLASTY, LEFT: Primary | ICD-10-CM

## 2024-05-16 ENCOUNTER — TELEPHONE (OUTPATIENT)
Dept: FAMILY MEDICINE CLINIC | Age: 69
End: 2024-05-16

## 2024-05-16 DIAGNOSIS — E55.9 VITAMIN D DEFICIENCY: Primary | ICD-10-CM

## 2024-05-16 RX ORDER — ERGOCALCIFEROL 1.25 MG/1
50000 CAPSULE ORAL WEEKLY
Qty: 12 CAPSULE | Refills: 1 | Status: SHIPPED | OUTPATIENT
Start: 2024-05-16 | End: 2024-05-30 | Stop reason: SDUPTHER

## 2024-05-16 NOTE — TELEPHONE ENCOUNTER
Patient saw you 05/06/24 were you going to send in Vitamin-D supplement or did you want patient to take OTC? Doesn't know what mg.    Please advise

## 2024-05-30 DIAGNOSIS — E55.9 VITAMIN D DEFICIENCY: ICD-10-CM

## 2024-05-30 RX ORDER — ERGOCALCIFEROL 1.25 MG/1
50000 CAPSULE ORAL WEEKLY
Qty: 12 CAPSULE | Refills: 1 | Status: SHIPPED | OUTPATIENT
Start: 2024-05-30

## 2024-05-30 NOTE — TELEPHONE ENCOUNTER
Please send to [facility]        Ila Haley is calling to request a refill on the following medication(s):    Medication Request:  Requested Prescriptions     Pending Prescriptions Disp Refills    vitamin D (ERGOCALCIFEROL) 1.25 MG (49689 UT) CAPS capsule 12 capsule 1     Sig: Take 1 capsule by mouth once a week       Last Visit Date (If Applicable):  5/6/2024    Next Visit Date:    11/6/2024

## 2024-06-03 ENCOUNTER — TELEPHONE (OUTPATIENT)
Dept: FAMILY MEDICINE CLINIC | Age: 69
End: 2024-06-03

## 2024-06-03 NOTE — TELEPHONE ENCOUNTER
Yes she can get Vitamin D 5000 IU and take 10 weekly to equal 50,000 IU weekly. She can take 2 on MWF and 1 on SuTThSa and that will equal 50,000 IU

## 2024-06-03 NOTE — TELEPHONE ENCOUNTER
vitamin D (ERGOCALCIFEROL) 1.25 MG (35403 UT)    Is not on patient formulary, patient asking if there is a medication she can try OTC. Please advise

## 2024-08-14 ENCOUNTER — HOSPITAL ENCOUNTER (OUTPATIENT)
Age: 69
Setting detail: SPECIMEN
Discharge: HOME | End: 2024-08-14

## 2024-08-14 DIAGNOSIS — E55.9 VITAMIN D DEFICIENCY: ICD-10-CM

## 2024-08-14 DIAGNOSIS — E11.65 TYPE 2 DIABETES MELLITUS WITH HYPERGLYCEMIA, WITHOUT LONG-TERM CURRENT USE OF INSULIN (HCC): ICD-10-CM

## 2024-08-14 LAB
25(OH)D3 SERPL-MCNC: 53.8 NG/ML (ref 30–100)
EST. AVERAGE GLUCOSE BLD GHB EST-MCNC: 160 MG/DL
HBA1C MFR BLD: 7.2 % (ref 4–6)

## 2024-08-16 ENCOUNTER — TELEPHONE (OUTPATIENT)
Dept: FAMILY MEDICINE CLINIC | Age: 69
End: 2024-08-16

## 2024-08-16 DIAGNOSIS — E55.9 VITAMIN D DEFICIENCY: Primary | ICD-10-CM

## 2024-08-16 NOTE — TELEPHONE ENCOUNTER
Patient wants to know if she should continue with the same vitamin D medication based on her recent labs?    LACERATION

## 2024-08-20 ENCOUNTER — TELEPHONE (OUTPATIENT)
Dept: FAMILY MEDICINE CLINIC | Age: 69
End: 2024-08-20

## 2024-08-20 DIAGNOSIS — E11.65 TYPE 2 DIABETES MELLITUS WITH HYPERGLYCEMIA, WITHOUT LONG-TERM CURRENT USE OF INSULIN (HCC): Primary | ICD-10-CM

## 2024-10-09 NOTE — TELEPHONE ENCOUNTER
Ila Haley is calling to request a refill on the following medication(s):    Medication Request:  Requested Prescriptions     Pending Prescriptions Disp Refills    SITagliptin (JANUVIA) 100 MG tablet [Pharmacy Med Name: JANUVIA TABS 100MG] 90 tablet 3     Sig: TAKE 1 TABLET DAILY       Last Visit Date (If Applicable):  5/6/2024    Next Visit Date:    11/6/2024

## 2024-11-14 ENCOUNTER — HOSPITAL ENCOUNTER (OUTPATIENT)
Age: 69
Setting detail: SPECIMEN
Discharge: HOME | End: 2024-11-14

## 2024-11-14 DIAGNOSIS — E55.9 VITAMIN D DEFICIENCY: ICD-10-CM

## 2024-11-14 DIAGNOSIS — E11.65 TYPE 2 DIABETES MELLITUS WITH HYPERGLYCEMIA, WITHOUT LONG-TERM CURRENT USE OF INSULIN (HCC): ICD-10-CM

## 2024-11-14 LAB
25(OH)D3 SERPL-MCNC: 65.8 NG/ML (ref 30–100)
EST. AVERAGE GLUCOSE BLD GHB EST-MCNC: 169 MG/DL
HBA1C MFR BLD: 7.5 % (ref 4–6)

## 2024-11-22 ENCOUNTER — OFFICE VISIT (OUTPATIENT)
Dept: FAMILY MEDICINE CLINIC | Age: 69
End: 2024-11-22

## 2024-11-22 ENCOUNTER — HOSPITAL ENCOUNTER (OUTPATIENT)
Age: 69
Setting detail: SPECIMEN
Discharge: HOME | End: 2024-11-22

## 2024-11-22 VITALS
HEIGHT: 65 IN | OXYGEN SATURATION: 98 % | TEMPERATURE: 97.5 F | DIASTOLIC BLOOD PRESSURE: 70 MMHG | WEIGHT: 183 LBS | HEART RATE: 87 BPM | SYSTOLIC BLOOD PRESSURE: 136 MMHG | BODY MASS INDEX: 30.49 KG/M2

## 2024-11-22 DIAGNOSIS — E11.9 ENCOUNTER FOR DIABETIC FOOT EXAM (HCC): ICD-10-CM

## 2024-11-22 DIAGNOSIS — E78.5 DYSLIPIDEMIA: ICD-10-CM

## 2024-11-22 DIAGNOSIS — Z96.652 S/P TOTAL KNEE ARTHROPLASTY, LEFT: ICD-10-CM

## 2024-11-22 DIAGNOSIS — Z76.89 ENCOUNTER TO ESTABLISH CARE: Primary | ICD-10-CM

## 2024-11-22 DIAGNOSIS — Z23 NEED FOR INFLUENZA VACCINATION: ICD-10-CM

## 2024-11-22 DIAGNOSIS — E11.65 TYPE 2 DIABETES MELLITUS WITH HYPERGLYCEMIA, WITHOUT LONG-TERM CURRENT USE OF INSULIN (HCC): ICD-10-CM

## 2024-11-22 DIAGNOSIS — E55.9 VITAMIN D DEFICIENCY: ICD-10-CM

## 2024-11-22 DIAGNOSIS — Z12.11 COLON CANCER SCREENING: ICD-10-CM

## 2024-11-22 LAB
CREAT UR-MCNC: 189 MG/DL (ref 28–217)
MICROALBUMIN UR-MCNC: 15 MG/L (ref 0–20)
MICROALBUMIN/CREAT UR-RTO: 8 MCG/MG CREAT (ref 0–25)

## 2024-11-22 RX ORDER — CHOLECALCIFEROL (VITAMIN D3) 1250 MCG
CAPSULE ORAL
COMMUNITY
Start: 2024-05-30

## 2024-11-22 SDOH — HEALTH STABILITY: PHYSICAL HEALTH: ON AVERAGE, HOW MANY MINUTES DO YOU ENGAGE IN EXERCISE AT THIS LEVEL?: 50 MIN

## 2024-11-22 SDOH — HEALTH STABILITY: PHYSICAL HEALTH: ON AVERAGE, HOW MANY DAYS PER WEEK DO YOU ENGAGE IN MODERATE TO STRENUOUS EXERCISE (LIKE A BRISK WALK)?: 3 DAYS

## 2024-11-22 ASSESSMENT — ENCOUNTER SYMPTOMS
ALLERGIC/IMMUNOLOGIC NEGATIVE: 1
EYES NEGATIVE: 1
GASTROINTESTINAL NEGATIVE: 1
RESPIRATORY NEGATIVE: 1

## 2024-11-22 NOTE — PROGRESS NOTES
Aged Out    Meningococcal (ACWY) vaccine  Aged Out    Pneumococcal 0-64 years Vaccine  Discontinued    Cervical cancer screen  Discontinued     
*ADVANCED RIGHT performed by Shadi Hernandez DO at [facility] OR    TOTAL KNEE ARTHROPLASTY Left 05/02/2023    LEFT KNEE TOTAL ARTHROPLASTY - S/N performed by Shadi Hernandez DO at [facility] OR        Social History     Socioeconomic History    Marital status:      Spouse name: None    Number of children: None    Years of education: None    Highest education level: None   Tobacco Use    Smoking status: Never    Smokeless tobacco: Never   Vaping Use    Vaping status: Never Used   Substance and Sexual Activity    Alcohol use: Yes     Alcohol/week: 2.0 standard drinks of alcohol     Types: 2 Glasses of wine per week     Comment: ONCE A WEEK    Drug use: No    Sexual activity: Yes     Partners: Male     Social Determinants of Health     Financial Resource Strain: Low Risk  (4/30/2024)    Overall Financial Resource Strain (CARDIA)     Difficulty of Paying Living Expenses: Not hard at all   Food Insecurity: No Food Insecurity (4/30/2024)    Hunger Vital Sign     Worried About Running Out of Food in the Last Year: Never true     Ran Out of Food in the Last Year: Never true   Transportation Needs: Unknown (4/30/2024)    PRAPARE - Transportation     Lack of Transportation (Non-Medical): No   Physical Activity: Sufficiently Active (11/22/2024)    Exercise Vital Sign     Days of Exercise per Week: 3 days     Minutes of Exercise per Session: 50 min   Housing Stability: Unknown (4/30/2024)    Housing Stability Vital Sign     Unstable Housing in the Last Year: No        Family History   Problem Relation Age of Onset    Cancer Mother         pancreas    Heart Disease Mother         tachycardia; ? diagnosis    High Blood Pressure Mother     High Cholesterol Mother     Cancer Father         thyroid, colon    Diabetes Father         adult onset    Colon Cancer Father     Vision Loss Father         mac degeneration    Cancer Maternal Uncle         pancreas; bladder;     Heart Disease Maternal Uncle         hearth failure,

## 2024-12-11 ENCOUNTER — TELEPHONE (OUTPATIENT)
Dept: FAMILY MEDICINE CLINIC | Age: 69
End: 2024-12-11

## 2024-12-11 NOTE — TELEPHONE ENCOUNTER
Patient is calling because she was recently seen by Dr. Springer 11/22/2024 and was contacted by insurance stating that he is not in her network.Patient is calling to see what need to be done or what she needs to do. Informed her that I will give information to my office manger and to give her some time to look into it and we will reach out to her once we know more.

## 2024-12-13 LAB — NONINV COLON CA DNA+OCC BLD SCRN STL QL: NEGATIVE

## 2025-01-09 NOTE — LETTER
[address] [facility] [place] [address]  Dept: [phone]  Dept Fax: [phone]      Medical Evaluation for Surgery    Patient Name:  Chantale Haley    1955     Type of Surgery:   Right total knee replacement    Patient has been medically evaluated for the above surgery. __________He/She has a acceptable low risk for the proposed surgery. __________He/She has a moderate risk for the proposed surgery. __________He/She has a high risk for the proposed surgery. __________He/She needs further testing/consultation. Criteria for Total Joint Replacement:  *Free from UTI *HTN - Controlled *BMI < 40  *A1c < 7   *Smoking- referred to quit line; medication as appropriate  *CAD/CHF - Controlled - Cardiology Clearance Required    *HGB > 15 female or > 15 male    Thank you for your consultation. Should you have any questions, please do not hesitate to call the office.       Sincerely,       [employer]       Sign_____________________________________________Date__________________
details…

## 2025-03-03 DIAGNOSIS — E78.5 DYSLIPIDEMIA: ICD-10-CM

## 2025-03-03 RX ORDER — ATORVASTATIN CALCIUM 40 MG/1
TABLET, FILM COATED ORAL
Qty: 90 TABLET | Refills: 1 | Status: SHIPPED | OUTPATIENT
Start: 2025-03-03

## 2025-03-03 NOTE — TELEPHONE ENCOUNTER
Ila Haley is calling to request a refill on the following medication(s):    Medication Request:  Requested Prescriptions     Pending Prescriptions Disp Refills    atorvastatin (LIPITOR) 40 MG tablet 90 tablet 3     Sig: TAKE 1 TABLET DAILY       Last Visit Date (If Applicable):  11/22/2024    Next Visit Date:    4/8/2025

## 2025-04-08 ENCOUNTER — HOSPITAL ENCOUNTER (OUTPATIENT)
Age: 70
Setting detail: SPECIMEN
Discharge: HOME | End: 2025-04-08

## 2025-04-08 ENCOUNTER — OFFICE VISIT (OUTPATIENT)
Dept: FAMILY MEDICINE CLINIC | Age: 70
End: 2025-04-08
Payer: MEDICARE

## 2025-04-08 VITALS
SYSTOLIC BLOOD PRESSURE: 124 MMHG | WEIGHT: 182.8 LBS | HEART RATE: 65 BPM | OXYGEN SATURATION: 98 % | DIASTOLIC BLOOD PRESSURE: 80 MMHG | BODY MASS INDEX: 30.46 KG/M2 | HEIGHT: 65 IN

## 2025-04-08 DIAGNOSIS — Z13.31 DEPRESSION SCREENING: ICD-10-CM

## 2025-04-08 DIAGNOSIS — E78.5 DYSLIPIDEMIA: ICD-10-CM

## 2025-04-08 DIAGNOSIS — E55.9 VITAMIN D DEFICIENCY: ICD-10-CM

## 2025-04-08 DIAGNOSIS — M15.0 PRIMARY OSTEOARTHRITIS INVOLVING MULTIPLE JOINTS: ICD-10-CM

## 2025-04-08 DIAGNOSIS — Z71.89 ENCOUNTER FOR MEDICATION REVIEW AND COUNSELING: ICD-10-CM

## 2025-04-08 DIAGNOSIS — E11.65 TYPE 2 DIABETES MELLITUS WITH HYPERGLYCEMIA, WITHOUT LONG-TERM CURRENT USE OF INSULIN (HCC): ICD-10-CM

## 2025-04-08 DIAGNOSIS — Z00.00 MEDICARE ANNUAL WELLNESS VISIT, SUBSEQUENT: ICD-10-CM

## 2025-04-08 DIAGNOSIS — Z96.652 S/P TOTAL KNEE ARTHROPLASTY, LEFT: ICD-10-CM

## 2025-04-08 DIAGNOSIS — Z96.651 TOTAL KNEE REPLACEMENT STATUS, RIGHT: ICD-10-CM

## 2025-04-08 DIAGNOSIS — Z00.00 MEDICARE ANNUAL WELLNESS VISIT, SUBSEQUENT: Primary | ICD-10-CM

## 2025-04-08 LAB
25(OH)D3 SERPL-MCNC: 81.8 NG/ML (ref 30–100)
ALBUMIN SERPL-MCNC: 4.2 G/DL (ref 3.5–5.2)
ALBUMIN/GLOB SERPL: 1.4 {RATIO} (ref 1–2.5)
ALP SERPL-CCNC: 87 U/L (ref 35–104)
ALT SERPL-CCNC: 34 U/L (ref 10–35)
ANION GAP SERPL CALCULATED.3IONS-SCNC: 15 MMOL/L (ref 9–16)
AST SERPL-CCNC: 23 U/L (ref 10–35)
BACTERIA URNS QL MICRO: NORMAL
BASOPHILS # BLD: 0.05 K/UL (ref 0–0.2)
BASOPHILS NFR BLD: 1 % (ref 0–2)
BILIRUB SERPL-MCNC: 0.5 MG/DL (ref 0–1.2)
BILIRUB UR QL STRIP: NEGATIVE
BUN SERPL-MCNC: 16 MG/DL (ref 8–23)
CALCIUM SERPL-MCNC: 9.8 MG/DL (ref 8.6–10.4)
CASTS #/AREA URNS LPF: NORMAL /LPF (ref 0–8)
CHLORIDE SERPL-SCNC: 105 MMOL/L (ref 98–107)
CHOLEST SERPL-MCNC: 193 MG/DL (ref 0–199)
CHOLESTEROL/HDL RATIO: 3.7
CLARITY UR: ABNORMAL
CO2 SERPL-SCNC: 23 MMOL/L (ref 20–31)
COLOR UR: YELLOW
CREAT SERPL-MCNC: 0.8 MG/DL (ref 0.6–0.9)
CREAT UR-MCNC: 142 MG/DL (ref 28–217)
EOSINOPHIL # BLD: 0.1 K/UL (ref 0–0.44)
EOSINOPHILS RELATIVE PERCENT: 2 % (ref 1–4)
EPI CELLS #/AREA URNS HPF: NORMAL /HPF (ref 0–5)
ERYTHROCYTE [DISTWIDTH] IN BLOOD BY AUTOMATED COUNT: 13.8 % (ref 11.8–14.4)
EST. AVERAGE GLUCOSE BLD GHB EST-MCNC: 160 MG/DL
GFR, ESTIMATED: 80 ML/MIN/1.73M2
GLUCOSE SERPL-MCNC: 125 MG/DL (ref 74–99)
GLUCOSE UR STRIP-MCNC: NEGATIVE MG/DL
HBA1C MFR BLD: 7.2 % (ref 4–6)
HCT VFR BLD AUTO: 44.7 % (ref 36.3–47.1)
HDLC SERPL-MCNC: 52 MG/DL
HGB BLD-MCNC: 13.6 G/DL (ref 11.9–15.1)
HGB UR QL STRIP.AUTO: NEGATIVE
IMM GRANULOCYTES # BLD AUTO: <0.03 K/UL (ref 0–0.3)
IMM GRANULOCYTES NFR BLD: 0 %
KETONES UR STRIP-MCNC: NEGATIVE MG/DL
LDLC SERPL CALC-MCNC: 94 MG/DL (ref 0–100)
LEUKOCYTE ESTERASE UR QL STRIP: ABNORMAL
LYMPHOCYTES NFR BLD: 1.86 K/UL (ref 1.1–3.7)
LYMPHOCYTES RELATIVE PERCENT: 29 % (ref 24–43)
MCH RBC QN AUTO: 27.3 PG (ref 25.2–33.5)
MCHC RBC AUTO-ENTMCNC: 30.4 G/DL (ref 28.4–34.8)
MCV RBC AUTO: 89.6 FL (ref 82.6–102.9)
MICROALBUMIN UR-MCNC: 13 MG/L (ref 0–20)
MICROALBUMIN/CREAT UR-RTO: 9 MCG/MG CREAT (ref 0–25)
MONOCYTES NFR BLD: 0.4 K/UL (ref 0.1–1.2)
MONOCYTES NFR BLD: 6 % (ref 3–12)
NEUTROPHILS NFR BLD: 62 % (ref 36–65)
NEUTS SEG NFR BLD: 3.97 K/UL (ref 1.5–8.1)
NITRITE UR QL STRIP: NEGATIVE
NRBC BLD-RTO: 0 PER 100 WBC
PH UR STRIP: 5 [PH] (ref 5–8)
PLATELET # BLD AUTO: 285 K/UL (ref 138–453)
PMV BLD AUTO: 12.4 FL (ref 8.1–13.5)
POTASSIUM SERPL-SCNC: 4.3 MMOL/L (ref 3.7–5.3)
PROT SERPL-MCNC: 7.1 G/DL (ref 6.6–8.7)
PROT UR STRIP-MCNC: NEGATIVE MG/DL
RBC # BLD AUTO: 4.99 M/UL (ref 3.95–5.11)
RBC #/AREA URNS HPF: NORMAL /HPF (ref 0–4)
SODIUM SERPL-SCNC: 143 MMOL/L (ref 136–145)
SP GR UR STRIP: 1.02 (ref 1–1.03)
TRIGL SERPL-MCNC: 234 MG/DL
TSH SERPL DL<=0.05 MIU/L-ACNC: 1.73 UIU/ML (ref 0.27–4.2)
UROBILINOGEN UR STRIP-ACNC: NORMAL EU/DL (ref 0–1)
VLDLC SERPL CALC-MCNC: 47 MG/DL (ref 1–30)
WBC #/AREA URNS HPF: NORMAL /HPF (ref 0–5)
WBC OTHER # BLD: 6.4 K/UL (ref 3.5–11.3)

## 2025-04-08 PROCEDURE — G0439 PPPS, SUBSEQ VISIT: HCPCS | Performed by: FAMILY MEDICINE

## 2025-04-08 PROCEDURE — 1160F RVW MEDS BY RX/DR IN RCRD: CPT | Performed by: FAMILY MEDICINE

## 2025-04-08 PROCEDURE — 1123F ACP DISCUSS/DSCN MKR DOCD: CPT | Performed by: FAMILY MEDICINE

## 2025-04-08 PROCEDURE — 1159F MED LIST DOCD IN RCRD: CPT | Performed by: FAMILY MEDICINE

## 2025-04-08 SDOH — ECONOMIC STABILITY: FOOD INSECURITY: WITHIN THE PAST 12 MONTHS, YOU WORRIED THAT YOUR FOOD WOULD RUN OUT BEFORE YOU GOT MONEY TO BUY MORE.: NEVER TRUE

## 2025-04-08 SDOH — ECONOMIC STABILITY: FOOD INSECURITY: WITHIN THE PAST 12 MONTHS, THE FOOD YOU BOUGHT JUST DIDN'T LAST AND YOU DIDN'T HAVE MONEY TO GET MORE.: NEVER TRUE

## 2025-04-08 ASSESSMENT — PATIENT HEALTH QUESTIONNAIRE - PHQ9
2. FEELING DOWN, DEPRESSED OR HOPELESS: NOT AT ALL
SUM OF ALL RESPONSES TO PHQ QUESTIONS 1-9: 0
1. LITTLE INTEREST OR PLEASURE IN DOING THINGS: NOT AT ALL
SUM OF ALL RESPONSES TO PHQ QUESTIONS 1-9: 0

## 2025-04-08 ASSESSMENT — LIFESTYLE VARIABLES
HOW OFTEN DO YOU HAVE A DRINK CONTAINING ALCOHOL: 2-3 TIMES A WEEK
HOW MANY STANDARD DRINKS CONTAINING ALCOHOL DO YOU HAVE ON A TYPICAL DAY: 1 OR 2

## 2025-04-08 NOTE — PROGRESS NOTES
updated this visit:  Tobacco  Allergies  Meds  Problems  Med Hx  Surg Hx  Fam Hx  Sexual   Hx

## 2025-04-09 ENCOUNTER — RESULTS FOLLOW-UP (OUTPATIENT)
Dept: FAMILY MEDICINE CLINIC | Age: 70
End: 2025-04-09

## 2025-07-16 NOTE — FLOWSHEET NOTE
[x] [facility]  [address]  [phone]  F: [phone]     Physical Therapy Daily Treatment Note    Date:  2023  Patient Name:  Jennifer Haley      :  1955   MRN: [id number]  Physician: Dr. Shanae Hood, DO                            Insurance: [employer] [place] [employer] Advantage, [facility]  Medical Diagnosis: Y31.100 (ICD-10-CM) - Status post surgery; M17.12 (ICD-10-CM) - Primary osteoarthritis of left knee  Rehab Codes: M 25.562, M 25.662, M 62.81, R 26.89, R 60.0  Onset date: 23                   Next 's appt.: 23      Visit# / total visits:    Cancels/No Shows: 0/0      Subjective:    Pain:  [x] Yes  [] No Location: L knee   Pain Rating: (0-10 scale) 0/10  Pain altered Tx:  [x] No  [] Yes  Action:  Comments: Doing well today with no reports of pain currently. Tolerating Tx well. Objective:  Modalities:  vasopneumatic cold/compression, 15 minutes, 38 degrees, moderate pressure, to left knee with leg on bolster, after exs. Precautions:  Exercises:  Exercise Reps/ Time Weight/ Level Completed 23  Comments   NuStep 6 min  L 3.0  Inc resistance 6.13   Treadmill 12 min 1.5 mph  0.8 mph  6 min fwd/6 min bkwd   Stationary bike 5 min  X Able to make full revolution. Seat on 7.                  Standing       Calf Stretch on Incline 3x30\"  X    Hamstring Stretch on Step 3x30\"  X    Knee Flexion Stretch 10x10\"  X    Calf Raises on Incline 2x10 1 lb     DF Raises on Incline 2x10 1 lb     SLS 3x30\"      3 - Way Hip 20 x ea 1 lb      Step up 20x 8 inch  1 lb X Regressed to 4\" box 6.13   Step lateral 20x 6 inch  1 lb X    Step down 2x10 4 inch  1 lb X 5 x 4\"  15 x 2\"  Focused on technique   Mini Lunge 2x10  X Added 23  fwd          HS Curl 2x10 1 lb     March  20 x ea 1 lb     TKE 20x5\" Plum X Added 6.13                 Total Gym       Squats 3x10 3 sec  Level 35 X Cues to increase L LE weight bearing    SL Squats Yes

## 2025-08-27 DIAGNOSIS — E78.5 DYSLIPIDEMIA: ICD-10-CM

## 2025-08-28 RX ORDER — ATORVASTATIN CALCIUM 40 MG/1
40 TABLET, FILM COATED ORAL DAILY
Qty: 90 TABLET | Refills: 1 | Status: SHIPPED | OUTPATIENT
Start: 2025-08-28

## (undated) DEVICE — APPLICATOR MEDICATED 26 CC SOLUTION HI LT ORNG CHLORAPREP

## (undated) DEVICE — 450 ML BOTTLE OF 0.05% CHLORHEXIDINE GLUCONATE IN 99.95% STERILE WATER FOR IRRIGATION, USP AND APPLICATOR.: Brand: IRRISEPT ANTIMICROBIAL WOUND LAVAGE

## (undated) DEVICE — SUTURE NONABSORBABLE MONOFILAMENT 3-0 PS-1 18 IN BLK ETHILON 1663H

## (undated) DEVICE — SOLUTION IRRIG 3000ML 0.9% SOD CHL USP UROMATIC PLAS CONT

## (undated) DEVICE — TOTAL TRAY, 16FR 10ML SIL FOLEY, URN: Brand: MEDLINE

## (undated) DEVICE — INFUSION PUMP KIT FOR PAIN MGMT A19] INFUSYSTEM]

## (undated) DEVICE — Z DISCONTINUED USE 2423037 APPLICATOR MEDICATED 3 CC CHLORHEXIDINE GLUC 2% CHLORAPREP

## (undated) DEVICE — ADHESIVE SKIN CLSR 0.7ML TOP DERMBND ADV

## (undated) DEVICE — SUTURE VCRL SZ 0 L36IN ABSRB UD L36MM CT-1 1/2 CIR J946H

## (undated) DEVICE — DRESSING FOAM W4XL4IN AG SIL FACE BORD IONIC ANTIMIC ADH

## (undated) DEVICE — GLOVE ORANGE PI 8 1/2   MSG9085

## (undated) DEVICE — COVER XR CASS W24XL25IN CLR POLY FLM DRAPEABLE W REL LNR

## (undated) DEVICE — STOCKINETTE,IMPERVIOUS,12X48,STERILE: Brand: MEDLINE

## (undated) DEVICE — SUTURE MCRYL SZ 3-0 L18IN ABSRB UD L19MM PS-2 3/8 CIR PRIM Y497G

## (undated) DEVICE — SUTURE VCRL SZ 0 L27IN ABSRB UD L36MM CT-1 1/2 CIR J260H

## (undated) DEVICE — STERILE PATIENT PROTECTIVE PAD FOR IMP® KNEE POSITIONERS & COHESIVE WRAP (10 / CASE): Brand: DE MAYO KNEE POSITIONER®

## (undated) DEVICE — PACK PROCEDURE SURG SVMMC TOT KNEE

## (undated) DEVICE — TOWEL,OR,DSP,ST,NATURAL,DLX,4/PK,20PK/CS: Brand: MEDLINE

## (undated) DEVICE — 3 BONE CEMENT MIXER: Brand: MIXEVAC

## (undated) DEVICE — CHLORAPREP 26ML ORANGE

## (undated) DEVICE — Device

## (undated) DEVICE — ZIMMER® STERILE DISPOSABLE TOURNIQUET CUFF WITH PROTECTIVE SLEEVE AND PLC, DUAL PORT, SINGLE BLADDER, 24 IN. (61 CM)

## (undated) DEVICE — SKIN AFFIX SURG ADHESIVE 72/CS 0.55ML: Brand: MEDLINE

## (undated) DEVICE — 3M™ IOBAN™ 2 ANTIMICROBIAL INCISE DRAPE 6650EZ: Brand: IOBAN™ 2

## (undated) DEVICE — SUTURE STRATAFIX SPRL SZ 2-0 L9IN ABSRB VLT MH L36MM 1/2 SXPD2B408

## (undated) DEVICE — OPTIFOAM GENTLE AG,POST-OP STRIP,3.5X14: Brand: MEDLINE

## (undated) DEVICE — HYPODERMIC SAFETY NEEDLE: Brand: MAGELLAN

## (undated) DEVICE — SUTURE STRATAFIX SPRL SZ 3-0 L5IN ABSRB UD FS L26MM 3/8 CIR SXMP2B411

## (undated) DEVICE — ZIPPERED TOGA, 2X LARGE: Brand: FLYTE

## (undated) DEVICE — SUTURE STRATAFIX SPRL SZ 1 L14IN ABSRB VLT L48CM CTX 1/2 SXPD2B405

## (undated) DEVICE — GLOVE ORANGE PI 8   MSG9080

## (undated) DEVICE — SUTURE ABSRB BRAID COAT UD CP NO 2 27IN VCRL J195H

## (undated) DEVICE — DRESSING FOAM W4XL10IN AG SIL ADH ANTIMIC POSTOP OPTIFOAM

## (undated) DEVICE — GOWN,AURORA,NONRNF,XL,30/CS: Brand: MEDLINE

## (undated) DEVICE — GLOVE ORANGE PI 7 1/2   MSG9075

## (undated) DEVICE — SUTURE VCRL SZ 2-0 L27IN ABSRB UD L22MM X-1 1/2 CIR REV CUT J459H

## (undated) DEVICE — SYRINGE, LUER LOCK, 10ML: Brand: MEDLINE

## (undated) DEVICE — GLOVE ORANGE PI 7   MSG9070

## (undated) DEVICE — SOLUTION IRRIG 1000ML 0.9% SOD CHL USP POUR PLAS BTL

## (undated) DEVICE — DRAPE,U/ SHT,SPLIT,PLAS,STERIL: Brand: MEDLINE

## (undated) DEVICE — OSCILLATING TIP SAW CARTRIDGE: Brand: PRECISION FALCON

## (undated) DEVICE — STERILE PVP: Brand: MEDLINE INDUSTRIES, INC.

## (undated) DEVICE — NAVIO FLAT MARKERS: Brand: NAVIO

## (undated) DEVICE — SUTURE VCRL SZ 1 L36IN ABSRB UD L36MM CT-1 1/2 CIR J947H

## (undated) DEVICE — TRAY NRV BLK 1 CATHETER CONN CLMP STYL

## (undated) DEVICE — ADHESIVE SKIN CLOSURE TOP 36 CC HI VISC DERMBND MINI

## (undated) DEVICE — ZIPPERED TOGA, 2X LARGE: Brand: FLYTE, SURGICOOL

## (undated) DEVICE — TUBE IRRIG HNDPC HI FLO TP INTRPULS W/SUCTION TUBE

## (undated) DEVICE — GARMENT,MEDLINE,DVT,INT,CALF,MED, GEN2: Brand: MEDLINE

## (undated) DEVICE — COMPONENT KNEE LOWER EXTREMITIES. ANCIL ZIRCONIUM NAVIO DISP

## (undated) DEVICE — GLOVE SURG SZ 85 CRM LTX FREE POLYISOPRENE POLYMER BEAD ANTI

## (undated) DEVICE — PENCIL ES L3M BTTN SWCH HOLSTER W/ BLDE ELECTRD EDGE

## (undated) DEVICE — ELECTRODE PT RET AD L9FT HI MOIST COND ADH HYDRGEL CORDED

## (undated) DEVICE — SUTURE VCRL SZ 1 L36IN ABSRB UD L48MM CTXB 1/2 CIR BLNT PNT JB977H